# Patient Record
Sex: FEMALE | Race: WHITE | NOT HISPANIC OR LATINO | Employment: FULL TIME | URBAN - METROPOLITAN AREA
[De-identification: names, ages, dates, MRNs, and addresses within clinical notes are randomized per-mention and may not be internally consistent; named-entity substitution may affect disease eponyms.]

---

## 2017-01-04 ENCOUNTER — TELEPHONE (OUTPATIENT)
Dept: FAMILY MEDICINE | Facility: CLINIC | Age: 42
End: 2017-01-04

## 2017-01-04 NOTE — TELEPHONE ENCOUNTER
1/4/2017    Call Regarding Preventive Health Screening Cervical/PAP    Attempt 1    Message on voicemail     Comments:           Outreach   gene

## 2017-01-19 NOTE — TELEPHONE ENCOUNTER
Call Regarding Preventive Health Screening Cervical/PAP    Attempt 1    Message on voicemail     Comments:       Outreach   Velma Tejada

## 2017-01-25 NOTE — TELEPHONE ENCOUNTER
1/25/2017    Call Regarding Preventive Health Screening Cervical/PAP    Attempt 2    Message on voicemail     Comments:         Outreach   Tabitha Wilburn

## 2017-07-01 ENCOUNTER — HEALTH MAINTENANCE LETTER (OUTPATIENT)
Age: 42
End: 2017-07-01

## 2017-12-01 ENCOUNTER — OFFICE VISIT (OUTPATIENT)
Dept: OPHTHALMOLOGY | Facility: CLINIC | Age: 42
End: 2017-12-01

## 2017-12-01 DIAGNOSIS — H52.13 MYOPIA OF BOTH EYES: Primary | ICD-10-CM

## 2017-12-01 ASSESSMENT — REFRACTION_MANIFEST
OD_AXIS: 036
OS_AXIS: 134
OD_CYLINDER: +0.75
OS_SPHERE: -5.25
OD_SPHERE: -4.75
OS_CYLINDER: +0.75

## 2017-12-01 ASSESSMENT — CONF VISUAL FIELD
OD_NORMAL: 1
METHOD: COUNTING FINGERS
OS_NORMAL: 1

## 2017-12-01 ASSESSMENT — REFRACTION_WEARINGRX
OS_CYLINDER: +0.75
OS_AXIS: 127
OS_SPHERE: -5.50
OD_SPHERE: -4.75
SPECS_TYPE: SVL
OD_CYLINDER: +0.50
OD_AXIS: 030

## 2017-12-01 ASSESSMENT — VISUAL ACUITY
METHOD: SNELLEN - LINEAR
CORRECTION_TYPE: GLASSES
OD_CC+: -1
OD_CC: J1+
OS_CC: 20/20
OD_CC: 20/20
OS_CC: J1+

## 2017-12-01 ASSESSMENT — CUP TO DISC RATIO
OD_RATIO: 0.2
OS_RATIO: 0.2

## 2017-12-01 ASSESSMENT — TONOMETRY
OS_IOP_MMHG: 20
IOP_METHOD: TONOPEN
OD_IOP_MMHG: 18

## 2017-12-01 ASSESSMENT — EXTERNAL EXAM - RIGHT EYE: OD_EXAM: NORMAL

## 2017-12-01 ASSESSMENT — SLIT LAMP EXAM - LIDS
COMMENTS: NORMAL
COMMENTS: NORMAL

## 2017-12-01 ASSESSMENT — EXTERNAL EXAM - LEFT EYE: OS_EXAM: NORMAL

## 2017-12-01 NOTE — MR AVS SNAPSHOT
After Visit Summary   12/1/2017    Tressa Jeong    MRN: [id number]           Patient Information     Date Of Birth          1975        Visit Information        Provider Department      12/1/2017 2:20 PM Fortino Fleming, BRANDI M [facility]        Today's Diagnoses     Myopia of both eyes    -  1       Follow-ups after your visit        Follow-up notes from your care team     Return if symptoms worsen or fail to improve, for Contact Lens Fitting.      Who to contact     Please call your clinic at [phone] to:    Ask questions about your health    Make or cancel appointments    Discuss your medicines    Learn about your test results    Speak to your doctor   If you have compliments or concerns about an experience at your clinic, or if you wish to file a complaint, please contact [facility] Patient Relations at [phone] or email us at [email or web address]         Additional Information About Your Visit        MyChart Information     [email or web address] gives you secure access to your electronic health record. If you see a primary care provider, you can also send messages to your care team and make appointments. If you have questions, please call your primary care clinic.  If you do not have a primary care provider, please call [phone] and they will assist you.      [facility] is an electronic gateway that provides easy, online access to your medical records. With [facility], you can request a clinic appointment, read your test results, renew a prescription or communicate with your care team.     To access your existing account, please contact your [facility] or call [phone] for assistance.        Care EveryWhere ID     This is your Care EveryWhere ID. This could be used by other organizations to access your [place] medical records  [id number]         Blood Pressure from Last 3 Encounters:   04/07/16 130/82   09/24/07  102/70    Weight from Last 3 Encounters:   04/07/16 85.3 kg (188 lb)   09/24/07 72.6 kg (160 lb)              We Performed the Following     Refraction        Primary Care Provider Office Phone # Fax #    Ute Fowler [phone] [address] [facility] [address]        Equal Access to Services     [facility]: Hadii aad ku hadasho Soomaali, waaxda luqadaha, qaybta kaalmada adeegyada, waxay idiin hayaan adeeg khenochjose a la'aan . So [facility] [phone].    ATENCIÓN: Si habla espaltaf, tiene a leonardo disposición servicios gratuitos de asistencia lingüística. Foziaame al [phone].    We comply with applicable federal civil rights laws and [place] laws. We do not discriminate on the basis of race, color, national origin, age, disability, sex, sexual orientation, or gender identity.            Thank you!     Thank you for choosing [facility]  for your care. Our goal is always to provide you with excellent care. Hearing back from our patients is one way we can continue to improve our services. Please take a few minutes to complete the written survey that you may receive in the mail after your visit with us. Thank you!             Your Updated Medication List - Protect others around you: Learn how to safely use, store and throw away your medicines at www.disposemymeds.org.          This list is accurate as of: 12/1/17  3:23 PM.  Always use your most recent med list.                   Brand Name Dispense Instructions for use Diagnosis    clonazePAM 0.5 MG tablet    klonoPIN    10 tablet    Take 0.5-1 tablets (0.25-0.5 mg) by mouth 2 times daily as needed for anxiety    Adjustment disorder with anxious mood       NO ACTIVE MEDICATIONS      .

## 2017-12-01 NOTE — NURSING NOTE
Chief Complaints and History of Present Illnesses   Patient presents with     Annual Eye Exam     HPI    Affected eye(s):  Both   Symptoms:     No blurred vision   No difficulty with reading      Frequency:  Constant       Do you have eye pain now?:  No      Comments:  Patient states vision has been stable since last eye exam 8 years ago, both eyes. Denies eye pain or irritation. Uses allergy eye drops PRN OU    Maritza Taylor COT 2:24 PM December 1, 2017

## 2019-10-04 ENCOUNTER — HEALTH MAINTENANCE LETTER (OUTPATIENT)
Age: 44
End: 2019-10-04

## 2020-11-07 ENCOUNTER — HEALTH MAINTENANCE LETTER (OUTPATIENT)
Age: 45
End: 2020-11-07

## 2021-01-30 ENCOUNTER — HEALTH MAINTENANCE LETTER (OUTPATIENT)
Age: 46
End: 2021-01-30

## 2021-03-27 ENCOUNTER — IMMUNIZATION (OUTPATIENT)
Dept: NURSING | Facility: CLINIC | Age: 46
End: 2021-03-27
Payer: MEDICAID

## 2021-03-27 PROCEDURE — 91301 PR COVID VAC MODERNA 100 MCG/0.5 ML IM: CPT

## 2021-03-27 PROCEDURE — 0011A PR COVID VAC MODERNA 100 MCG/0.5 ML IM: CPT

## 2021-04-24 ENCOUNTER — IMMUNIZATION (OUTPATIENT)
Dept: NURSING | Facility: CLINIC | Age: 46
End: 2021-04-24
Attending: FAMILY MEDICINE
Payer: COMMERCIAL

## 2021-04-24 PROCEDURE — 0012A PR COVID VAC MODERNA 100 MCG/0.5 ML IM: CPT

## 2021-04-24 PROCEDURE — 91301 PR COVID VAC MODERNA 100 MCG/0.5 ML IM: CPT

## 2021-09-11 ENCOUNTER — HEALTH MAINTENANCE LETTER (OUTPATIENT)
Age: 46
End: 2021-09-11

## 2022-01-01 ENCOUNTER — HEALTH MAINTENANCE LETTER (OUTPATIENT)
Age: 47
End: 2022-01-01

## 2022-02-26 ENCOUNTER — HEALTH MAINTENANCE LETTER (OUTPATIENT)
Age: 47
End: 2022-02-26

## 2022-09-13 ENCOUNTER — OFFICE VISIT (OUTPATIENT)
Dept: FAMILY MEDICINE | Facility: CLINIC | Age: 47
End: 2022-09-13
Payer: COMMERCIAL

## 2022-09-13 VITALS
OXYGEN SATURATION: 99 % | BODY MASS INDEX: 32.86 KG/M2 | HEIGHT: 65 IN | DIASTOLIC BLOOD PRESSURE: 78 MMHG | HEART RATE: 96 BPM | WEIGHT: 197.25 LBS | TEMPERATURE: 99 F | SYSTOLIC BLOOD PRESSURE: 122 MMHG

## 2022-09-13 DIAGNOSIS — Z13.220 SCREENING FOR HYPERLIPIDEMIA: ICD-10-CM

## 2022-09-13 DIAGNOSIS — Z12.31 VISIT FOR SCREENING MAMMOGRAM: ICD-10-CM

## 2022-09-13 DIAGNOSIS — Z12.11 SCREEN FOR COLON CANCER: ICD-10-CM

## 2022-09-13 DIAGNOSIS — F43.22 ADJUSTMENT DISORDER WITH ANXIOUS MOOD: ICD-10-CM

## 2022-09-13 DIAGNOSIS — Z12.4 CERVICAL CANCER SCREENING: ICD-10-CM

## 2022-09-13 PROCEDURE — 99203 OFFICE O/P NEW LOW 30 MIN: CPT | Performed by: FAMILY MEDICINE

## 2022-09-13 RX ORDER — CLONAZEPAM 0.5 MG/1
0.25-0.5 TABLET ORAL 2 TIMES DAILY PRN
Qty: 10 TABLET | Refills: 0 | Status: SHIPPED | OUTPATIENT
Start: 2022-09-13 | End: 2023-03-21

## 2022-09-13 ASSESSMENT — ANXIETY QUESTIONNAIRES
8. IF YOU CHECKED OFF ANY PROBLEMS, HOW DIFFICULT HAVE THESE MADE IT FOR YOU TO DO YOUR WORK, TAKE CARE OF THINGS AT HOME, OR GET ALONG WITH OTHER PEOPLE?: VERY DIFFICULT
6. BECOMING EASILY ANNOYED OR IRRITABLE: NEARLY EVERY DAY
5. BEING SO RESTLESS THAT IT IS HARD TO SIT STILL: NEARLY EVERY DAY
7. FEELING AFRAID AS IF SOMETHING AWFUL MIGHT HAPPEN: NOT AT ALL
GAD7 TOTAL SCORE: 18
7. FEELING AFRAID AS IF SOMETHING AWFUL MIGHT HAPPEN: NOT AT ALL
GAD7 TOTAL SCORE: 18
IF YOU CHECKED OFF ANY PROBLEMS ON THIS QUESTIONNAIRE, HOW DIFFICULT HAVE THESE PROBLEMS MADE IT FOR YOU TO DO YOUR WORK, TAKE CARE OF THINGS AT HOME, OR GET ALONG WITH OTHER PEOPLE: VERY DIFFICULT
GAD7 TOTAL SCORE: 18
4. TROUBLE RELAXING: NEARLY EVERY DAY
2. NOT BEING ABLE TO STOP OR CONTROL WORRYING: NEARLY EVERY DAY
1. FEELING NERVOUS, ANXIOUS, OR ON EDGE: NEARLY EVERY DAY
3. WORRYING TOO MUCH ABOUT DIFFERENT THINGS: NEARLY EVERY DAY

## 2022-09-13 ASSESSMENT — PATIENT HEALTH QUESTIONNAIRE - PHQ9
SUM OF ALL RESPONSES TO PHQ QUESTIONS 1-9: 17
SUM OF ALL RESPONSES TO PHQ QUESTIONS 1-9: 17
10. IF YOU CHECKED OFF ANY PROBLEMS, HOW DIFFICULT HAVE THESE PROBLEMS MADE IT FOR YOU TO DO YOUR WORK, TAKE CARE OF THINGS AT HOME, OR GET ALONG WITH OTHER PEOPLE: EXTREMELY DIFFICULT

## 2022-09-13 NOTE — PROGRESS NOTES
"  1. Adjustment disorder with anxious mood  This is a 48 yo female with longstanding history of anxiety, mental health issues (feels she was \"misdiagnosed\" as a teenager).  Nonetheless, she is currently feeling overwhelmed by her workplace - feels that the demands are unrealistic - works with a small group and her co-workers have already quit.  She has turned in her own resignation as well - however, has a couple weeks left.  There are 2 new people in place - and she feels responsible for their success behind her, so feels that she needs to continue to \"help\".  We discussed that she needs to care for her own mental health and leave her workplace dysfunction to someone else to manage.  She has used Clonazepam in the past - last prescription was reportedly 2020? (prior to that was 2016).  It is reasonable to have something to address the panic attacks currently but patient needs something more long-term to meet her needs.  She should be referred for mental health intervention - could use both therapy/counseling, but also medication management.  Discussed with patient.  She already has some sort of diagnostic assessment scheduled within the system (in the next couple days).  She is unable to give me details about this, but if she can't get both modalities, she will need to be seen.       - clonazePAM (KLONOPIN) 0.5 MG tablet; Take 0.5-1 tablets (0.25-0.5 mg) by mouth 2 times daily as needed for anxiety  Dispense: 10 tablet; Refill: 0    2. Visit for screening mammogram  3. Screen for colon cancer  4. Cervical cancer screening  5. Screening for hyperlipidemia  Reviewed HM - as noted - patient would like to address her physical needs at a later visit.        Nurys Bustillos is a 47 year old, presenting for the following health issues:  Panic Attack      Has been seen in past for anxiety - as an emergency   Has tried to follow up with mental health care   Hard to get appointments with pandemic  Struggles with executive " "functioning  Has appointment - telehealth - for assessment - [facility]  Needs \"big picture\" fix  18 months job - toxic - quit job yesterday  (3 people work there, and all 3 quit)    Has had panic attacks in the past - had some Klonopin - hung on to them for \"security blanket\"  Has used #20 since  - last rx was     2 co-workers seem \"better\"    Feels \"defective\"  Everything is harder for her than other people    Executive functioning -   Gets home, has things to accomplish , can't convince self to do things  Doesn't feel physically depressed -   Had major depression as a teenager -   Diagnosed late teens - went to therapy long-term - \"cyclothymic\"    Non-traditional student - couldn't find jobs - last 10 years has worked jobs, but they don't last     Saw someone previously at [facility] - got meds, but \"not good for me\" -   \"may have snapped me out of depression\" but therapists weren't helpful  Hasn't seen a doctor since  - when she got the Klonopin    Teens - had lots of medical problems -   Next door neighbor is 80 - helpful - \"my best friend\"    Patient not certain what the diagnosis was -   Right now, panic is \"bad\"  Feels out of control - wants to scream, punch somebody    Not suicidal - \"it does run in my family\"  2 maternal uncles committed suicide  Mom - bipolar - does \"okay\" - did better on lithium but had to stop  2 mat uncle - disabled by mental illness    Brother  age 19 - MVA -   Dad - very nice man - hard on her, not supportive    Went to college - got a degree - as long as attended classes/read books, did well - [employer] -   Left with 2 classes left - then returned 10 years later and got degree (bachelors)        History of Present Illness       Reason for visit:  Panic attack    She eats 4 or more servings of fruits and vegetables daily.She consumes 0 sweetened beverage(s) daily.She exercises with enough effort to increase her heart rate 10 to 19 minutes per day.  She " "exercises with enough effort to increase her heart rate 5 days per week.   She is taking medications regularly.    Today's PHQ-9         PHQ-9 Total Score: 17    PHQ-9 Q9 Thoughts of better off dead/self-harm past 2 weeks :   Not at all    How difficult have these problems made it for you to do your work, take care of things at home, or get along with other people: Extremely difficult             Review of Systems   Psychiatric/Behavioral: Positive for agitation, mood changes and sleep disturbance. The patient is nervous/anxious.    All other systems reviewed and are negative.           Objective    /78 (BP Location: Left arm, Patient Position: Sitting, Cuff Size: Adult Regular)   Pulse 96   Temp 99  F (37.2  C)   Ht 1.653 m (5' 5.08\")   Wt 89.5 kg (197 lb 4 oz)   LMP 09/08/2022 (Exact Date)   SpO2 99%   BMI 32.74 kg/m    Body mass index is 32.74 kg/m .  Physical Exam  Constitutional:       Comments: Anxious appearing     HENT:      Head: Normocephalic.      Right Ear: External ear normal.   Cardiovascular:      Rate and Rhythm: Normal rate and regular rhythm.      Pulses: Normal pulses.      Heart sounds: Normal heart sounds.   Pulmonary:      Effort: Pulmonary effort is normal.      Breath sounds: Normal breath sounds.   Musculoskeletal:         General: No swelling.      Cervical back: Neck supple.   Skin:     Findings: Rash present.   Neurological:      General: No focal deficit present.      Mental Status: She is alert.   Psychiatric:         Mood and Affect: Mood normal.         Thought Content: Thought content normal.            No results found for any visits on 09/13/22.                "

## 2022-09-14 ENCOUNTER — HOSPITAL ENCOUNTER (OUTPATIENT)
Dept: BEHAVIORAL HEALTH | Facility: CLINIC | Age: 47
Discharge: HOME | End: 2022-09-14
Attending: FAMILY MEDICINE | Admitting: FAMILY MEDICINE
Payer: COMMERCIAL

## 2022-09-14 DIAGNOSIS — F41.1 GAD (GENERALIZED ANXIETY DISORDER): Primary | ICD-10-CM

## 2022-09-14 PROCEDURE — 90791 PSYCH DIAGNOSTIC EVALUATION: CPT | Mod: GT,95 | Performed by: COUNSELOR

## 2022-09-14 ASSESSMENT — COLUMBIA-SUICIDE SEVERITY RATING SCALE - C-SSRS
ATTEMPT LIFETIME: NO
2. HAVE YOU ACTUALLY HAD ANY THOUGHTS OF KILLING YOURSELF?: NO
TOTAL  NUMBER OF INTERRUPTED ATTEMPTS LIFETIME: NO
6. HAVE YOU EVER DONE ANYTHING, STARTED TO DO ANYTHING, OR PREPARED TO DO ANYTHING TO END YOUR LIFE?: NO
1. HAVE YOU WISHED YOU WERE DEAD OR WISHED YOU COULD GO TO SLEEP AND NOT WAKE UP?: NO
TOTAL  NUMBER OF ABORTED OR SELF INTERRUPTED ATTEMPTS LIFETIME: NO

## 2022-09-14 NOTE — PROGRESS NOTES
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 symptoms and decline in functioning  2. Psychosocial, Cultural and Contextual Factors: None identified.  3. Principal DSM5 Diagnoses  (Sustained by DSM5 Criteria Listed Above):   296.32 (F33.1) Major Depressive Disorder, Recurrent Episode, Moderate _ and With anxious distress  300.02 (F41.1) Generalized Anxiety Disorder.  4. Other Diagnoses that is relevant to services:      5. 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 Prognosis: Expect Improvement and Relieve Acute Symptoms.  7. Likely consequences of symptoms if not treated: If untreated, patient's mental health will likely deteriorate and may require a higher level of care.       1. A safety and risk management plan has been developed including:  Recommended that patient call 911 or go to the local ED should there be a change in any of these risk factors.Report to child / adult protection services was not applicable.     2. Patient did not identify Congregation, ethnic or cultural issues relevant to therapy at this time     3. Initial Treatment will focus on:               Depressed Mood -               Anxiety -               Functional Impairment at: home and school/work.              4. Resources/Service Plan:     services are not indicated.   Modifications to assist communication are not indicated.   Additional disability accommodations are not indicated.      5. Collaboration:   Collaboration / coordination of treatment will be initiated with the following support professionals: Ute Fowler     6.  Referrals:   The following referral(s) will be initiated: IOP, individual therapy, medication management and ADHD testing.    Next Scheduled Appointment:     Medication management intake:  Date:  Friday, 9/23/2022  Time: 2:30 pm - 3:30 pm  Provider: Jeanne Nance PhD  PA-C  Location: [facility], [address], [address], [place] [address]  Phone: [phone]  Type: Telepsychiatry     Individual therapy intake:  Date: Monday, 9/19/2022  Time: 1:00 pm - 2:00 pm  Provider: Barbie Ahuja MA  [facility]  Location: [facility], [address] [place] [address], [address], [place] [address]  Phone: [phone]  Type: Teletherapy    ADHD testing:  Date: Thursday, 11/10/2022  Time: 1:00 pm - 2:30 pm  Provider: Mychal Rice MA  LP  Location: [facility], [address] [place] [address]  Phone: [phone]  Type: Testing      A Release of Information has been obtained for the following:     NA       Primary Emergency Contact: Mk Jeong   Home Phone: [phone]   Mobile Phone: [phone]   Relation: Father    7. ZANE:     ZANE:  Discussed the general effects of drugs and alcohol on health and well-being. Recommendations include: None    8. Records:   These were reviewed at time of assessment. Information in this assessment was obtained from the medical record and provided by patient who presents as a good historian. Patient will have open access to their mental health medical record.        Clinical Substantiation:  Patient is a 47 year old female who presents with a history of anxiety, depression, and a remote diagnosis of bipolar disorder but she  "disagrees with.  Patient reports she was seen in her 20s and diagnosed with bipolar disorder, however she has never had a manic episode she does not identify with any symptoms that would meet criteria for a bipolar diagnosis. Patient is not currently followed by any outpatient mental health providers. She recently was seen in primary care and obtained a prescription for Klonopin, this has been helpful for her, especially anxiety interrupts her sleep.    Patient reports that she is in \"crisis\", due to her anxiety.  Patient endorses an increase in severity and frequency of anxiety and depressive symptoms which have been exasperated by her toxic work environment.  Patient recently put in her 2-week notice as a result, and will have her last day on September 23.  She does not want to start looking for a job until she has her anxiety more under control.  She reports right now she is able to do his work, and come home and either play on her phone or go to sleep.  When she naps, she wakes up before bedtime takes her dog for a walk and checks on her mother and then tries to go to sleep, but has a difficult time sleeping because of her nap.    Patient reports that she is struggling with concentration and focus her task completion success rate is very low she would like to be able to start a task and complete a task like a \"normal person\".  She believes that she has issues with executive functioning skills.  She does endorse symptoms of depression including increased irritability, low mood, low energy, interruptions with sleep, and symptoms of anxiety that include diarrhea, nausea, and vomiting, as result her appetite has also been low.  Patient reports that she is struggling with household tasks, she reports she is embarrassed to have anybody in her home in its current state.    Patient denies any history of suicidal ideation, suicide attempts, or self-injurious behaviors.  Patient reports a heavier history of alcohol use " about 18 months ago where she would drink daily up to 5 drinks she determined that she was drinking too much and it was not good for her mental health and has not had alcohol since February of , 2021.  No history of ZANE treatment.      The patient's acute suicide risk was determined to be low due to the following factors: Denial of suicidal thoughts, no history of suicide attempts. Patient is not currently under the influence of alcohol or illicit substances, denies experiencing command hallucinations, and has no immediate access to firearms. The patient's acute risk could be higher if noncompliant with their treatment plan, medications, follow-up appointments or using illicit substances or alcohol. Protective factors include: dedication to family or friends;safe and stable environment;regular sleep;regular physical activity;sense of belonging;help seeking behaviors when distressed;effective problem solving skills;sense of meaning.    Patient has decompensated to the point where she is struggling to function exasperated by an extremely toxic work environment her symptoms continue to increase in severity and frequency.  Due to the anxiety and stress in her workplace, patient has put in her notice without having a another job in place.  Patient would benefit from outpatient mental health supports in place such as medication management and individual therapy to assist with managing symptoms of depression, anxiety, and issues with focus and concentration.  Because of the severity of her symptoms patient would benefit from an intensive outpatient program to also help medicate risk of hospitalization.     Placement/Program/Barriers Identified: Employment, uncontrolled mental health symptoms    Referral: IOP, Medication management, individual therapy and ADHD testing.        Provider Name/ Credentials: BELTRAN Garcia, DREW,  Diagnostic Assessment completed on September 14, 2022    This note was created by undersigned  using a Dragon dictation system. All typing errors or contextual distortion are unintentional and software inherent.          LOCUS Worksheet     Name: Tressa Jeong MRN: [id number]    : 1975      Gender:  female      PMI:     Provider Name: BELTRAN Garcia LADC   Provider NPI:  [id number]    Actual level of Care Provided:  Assessment and referral    Service(s) receiving or referred to:  IOP, medication management, individual therapy, and ADHD testing    Reason for Variance: Patient's mental health is negatively impacting their ability to function well.  Patient needs a higher level of care to stabilize mental health symptoms.  Patient will benefit from more support in the community.       Rating completed by: BELTRAN Garcia LADC      I. Risk of Harm:   1      Minimal Risk of Harm    II. Functional Status:   3      Moderate Impairment    III. Co-Morbidity:   2      Minor Co-Morbidity    IV - A. Recovery Environment - Level of Stress:   3      Moderately Stress Environment    IV - B. Recovery Environment - Level of Support:   3      Limited Support in Environment    V. Treatment and Recovery History:   3      Moderate to Equivocal Response to Treatment and Recovery Management    VI. Engagement and Recovery Project:   3      Limited Engagement and Recovery       18 Composite Score    Level of Care Recommendation:   17 to 19       High Intensity Community Based Services

## 2022-09-14 NOTE — PATIENT INSTRUCTIONS
Medication management intake:  Date:  Friday, 9/23/2022  Time: 2:30 pm - 3:30 pm  Provider: Jeanne Nance PhD  PA-C  Location: [facility], [address] [place] [address], [address] [address]  Phone: [phone]  Type: Telepsychiatry     Individual therapy intake:  Date: Monday, 9/19/2022  Time: 1:00 pm - 2:00 pm  Provider: Barbie Ahuja MA  [facility]  Location: [facility], [address], [address] [address]  Phone: [phone]  Type: Teletherapy    ADHD testing:  Date: Thursday, 11/10/2022  Time: 1:00 pm - 2:30 pm  Provider: Mychal Rice MA    Location: [facility], [address] [place] [address]  Phone: [phone]  Type: Testing    Welcome to [facility] Adult Mental Health Outpatient Programs    Thank you for choosing [facility]!    Congratulations! You have completed the first step in your recovery by participating in a Diagnostic Assessment. With your input, BELTRAN Garcia, [facility], is recommending the following level of care and services to best meet your needs.    Managing mental health symptoms while balancing life stressors can be difficult. Our mental health programming will provide the group therapy, education, skills, and support needed to improve your well-being while living a healthy and manageable lifestyle.    All our Adult Mental Health Outpatient Programs are group-based and allow you to meet with peers who are trying to manage similar symptoms and/or life circumstances in a safe and therapeutic setting.    Recommendations and Plan:    Level of Care:  Admission Intensive Outpatient Program     Start Date:  TBD    Schedule:  Admission IOP AM: Monday through Thursday @ 9 AM to 11:50 AM    If you were placed on a Wait List following your Diagnostic Assessment, please expect the following:  You will receive a phone call from the program within a few days to discuss a start date and plan.     Please contact the program at the number listed above if you are choosing to be removed from the Wait List, need to reschedule your start or if you have any additional questions.    Type of Participation:  Virtual     We are currently providing 100% online group-based programming. It is a requirement that you be physically in the State of [place] when accessing services. Our providers must be licensed in the state you are located in.      To provide the best group experience for everyone, we expect confidentiality, regular attendance, and respectful participation by all.      Cameras need to be on during groups. We want to see you!   Be sure to be in a private place where others will not overhear or walk in. Using headphones and making sure your screen is not visible to others are steps you can take to ensure confidentiality.   What is said in group, stays in group. (All personal or identifying information shared in group should be kept confidential and not shared with anyone).  NOTE: Audio or Visual recordings are not allowed and may result in immediate discharge from the program.  Zoom may automatically show your first and last name unless you change it when logging into group. We encourage you to change your name to your first or preferred name. You may also include preferred pronouns.   Please be sitting upright in a comfortable position. This will maximize engagement and participation for everyone.   Please refrain from smoking, eating, driving, or engaging in other distracting activities during groups.  Facilitators may provide reminders of the above expectations during group as needed.    If your camera is off and you are not responding to prompts, facilitators will assume you have left and place you in the waiting room. You will need to request to return to group.    Please do NOT cancel your appointments through [employer].  If you are going to be absent, please contact the program number and leave a message so  staff will not expect you.   You will NOT be billed for any sessions you do not attend.    See additional attendance and program participation information below.     Accessing Virtual Groups:    The best way to attend groups is through your [employer] account. Please ask staff if you need assistance setting up an account. [employer] HELPLINE: [phone] or [employer] - Login Page [email or web address]).  You will also need to download Zoom Download for Windows - Zoom to your computer (preferred), phone or iPad/Tablet devise. It is NOT necessary to log into or set up a Zoom account.  Log into My Chart each day before group.   Go to the  Visits  tab and select the current date.    You will be asked to verify personal information on the first day or for longer programs, every 30 days. Please allow extra time on your first day to complete this. You will also be asked to complete assessments regularly so we can monitor your progress and address concerns.    The daily invite through [employer] expires 15 minutes after group starts.   You will need to call the program number to request a link to the group if you:   log out of group once it begins   are late to group   get disconnected   are unable to access group for any reason    There is a new link created every day.    Breaks are provided between groups (10 minutes)   Do not log out.  You may mute or pause your video.   The group facilitator may put you in virtual waiting room until the next group starts.        Admission Intensive Outpatient Treatment Program Overview  (Admission-IOP) [phone]      Patients will be scheduled to start the Admission IOP track which meets four days/week. The treatment team will provide education, skills training, and support to build a foundation for a successful treatment experience. The focus is to help orient new patients to programming and prepare them for the best possible outcomes. This track is intended to be short-term, and most patients will  participate for approximately one week. Providers will continue to assess your needs, help you set up an individualized treatment plan and, with your input, determine which treatment team and peer group is best for you.       Topics covered include: a review of program expectations and structure, assessing group readiness, introduction to understanding healthy vs. unhealthy coping strategies, experiential mindfulness, and working to identify and create social supports and services. Additional topics will align with those listed in the grid below. All groups are facilitated by a Licensed mental health professional and include the expertise of a Registered Nurse and Occupational Therapist.       Patients will also see the program Psychiatrist and/or a psychologist on the first or second day of treatment.      Patients will transition to an IOP  home track,  where they will continue their treatment. The  home track  will meet three days per week. See more information in the grid.      Most patients attend the combined IOP services for up to 12-weeks. Time frames may vary throughout this process as patients will transfer when an appropriate placement opportunity is available. Your treatment team will work closely with you for a smooth transition.       [facility] provides several  home track  IOP options. Sometimes patients and/or the treatment team may determine that another service is recommended. Referrals will be offered when appropriate.      Intensive Outpatient Program Overview:   This level of care is less intensive than an inpatient or partial hospitalization program and provides more support than traditional individual psychotherapy.        Length of Stay Typically, patients attend up to 12 weeks of programming, although this can     vary depending on specific patient needs.      Treatment team During the Admission IOP patients will meet with a multi-disciplinary team  including: a psychiatrist,  psychotherapist, registered nurse, and occupational     therapist.      When transferred to an [facility]  home group , patients will continue groups with a team of licensed mental health professionals including psychotherapists and a psychiatrist and/or psychologist.          Group-based programming 3 groups per day; 3-4 days per week   50 minutes per group   10-minute break between each group   Facilitated by a member of the treatment team      Group Psychotherapy is provided daily, allowing time to check-in, process concerns and share feedback/support. All other groups include a topic and provide opportunities for education, skill building, discussion, and support.       Example   Group   Topics Admission IOP topics are listed above and will align with additional group topics covered throughout the 12-week combined programming.       Topics may include:        Behavior Activation, Cognitive Restructuring: Cognitive Distortions, Communication Skills/ Areas for Self-Improvement. Coping Skills, Discharge Planning, Dimensions of Wellness, Emotions, Forgiveness, Functional Nutrition, Grief, Life Transitions, Leisure Exploration, Life Skills, Listening for Meaning, Medication Assessment, Mental Health Social Support, Mindfulness, Mood Tracking/Triggers, Motivation and Procrastination, Relationship, Relaxation Techniques, Self-Awareness, Self-Confidence, Self-Care, Self-Compassion, Self-Esteem and Self-compassion, Shame and Guilt, Sleep hygiene, SMART Goals, Stages to [place] with Stress, Stigma, Strategies to Improve Motivation, Symptom Awareness, Time Management, Trauma Triggers, Values, Wellness       Psychiatrist and/or Psychologist Patients will continue to see the program psychiatrist and/or psychologist for follow-up every 30 days or more frequent, as needed.       If seeing the psychiatrist, they will partner with you and your other providers for medication management, as needed.    Psychiatry services will be billed  separately.    For insurance purposes, please coordinate with team to prevent scheduling to see the program psychiatrist on the same day you see your outpatient psychiatrist.    If seeing the psychologist, they will provide individual therapy. Medication management will not be provided.       NOTE: Either provider will continue to assess your progress and coordinate with the treatment team to determine when you may be ready for completion.  This is a program requirement.        Individual Therapy See psychologist services above.   Physical Health Screen You will meet with a program nurse within the first week to complete a brief physical health screen. This is a program requirement.   FMLA or Short-term Disability We encourage you to request completion of paperwork from your long-term providers.   If this is not an option, please notify the program nurse as soon as possible.  We will do our best to help you coordinate completion of paperwork.   Medical Record requests: please contact Release of Information  at [phone] and allow up to 14 days for records once the authorization for release is received.    Treatment and Discharge Planning Patients meet individually with team members for treatment planning.     We will help you develop goals and identify strengths and/or barriers.   We will discuss your program participation, progress, and discharge planning.   We are available to assist with referrals and service coordination; please let us know how best we can support your specific needs.   You will receive copies of your treatment plan and discharge summary via [employer].          An Important Note from your Program Treatment Team...    Welcome! We are happy to be partnering with you on your recovery journey. Our experienced clinicians have developed programming based on current research and evidence-based practice to provide you with high quality mental health treatment.     Attendance and Program  Participation:  You will participate in a variety of groups each day. Our goal is to provide you with a rich and varied therapeutic healing environment knowing patients have unique experiences and preferred ways of sharing, learning, processing, and engaging in the recovery process.  You are expected to attend all groups on time.  If you are going to be late or absent, please let a team member know the reason. You can also leave that same information at the number listed above.  In the event of an unreported absence, we will reach out to you. If we are unable to reach you, know that we may call your emergency contact.  We always attempt to establish contact with your emergency contact prior to initiating a wellness check.  While it is important that you continue to attend appointments with your individual therapist, psychiatrist, and other medical providers, you are encouraged to schedule these appointments outside of group hours whenever possible.  If your attendance becomes a concern, your treatment team will have a discussion with you and may start an Attendance Agreement. Following your Attendance Agreement is required to prevent early discharge from the program.  To get the most out of the program and to support your wellbeing we require that you do not use any chemicals, tobacco or vape products on screen or during program hours. The team is available to assist you if this is something you struggle with.  Please be mindful that you are part of a group; therefore, we ask that you be respectful of other group members' needs and do not use derogatory, offensive, or discriminatory language.  You will be removed from group if suspected of being under the influence of substances or if using language that negatively impacts the group.  Your treatment team will address any concerns with you and offer recommendations. A Behavior Agreement may be started. Following your Behavior Agreement is required to prevent early  discharge from the program.  Communication with other group members outside of group is discouraged. This can affect your treatment and the way the group functions.  If you choose to share contact information with group peers AFTER you are discharged from the program, this decision is completely independent of any program coordination or support.  While in the program, participants may not engage in any sexual or intimate relationships with each other outside of group. This may result in immediate discharge of both participants from the program.   Trauma:  Many of our patients have experienced trauma. You are not alone. This can be challenging for patients to manage. All our team members have been trained in Trauma Informed Care and will provide you with the education, skills training, and support that you need to stabilize your symptoms.  Specific details and descriptions of abuse, assault, violence, neglect, etc., are best processed in individual therapy as to avoid triggering other group members.  Discussing how traumatic experiences have impacted beliefs about self/others/world and practicing skills to cope with symptoms is very appropriate for group therapy.     We look forward to working together to support your mental health.     We love feedback from our patients about our program!  Please take a few moments to respond to surveys sent out by [employer].  This will help us continue to make improvements and to keep the things that are   important to you!

## 2022-09-18 ASSESSMENT — ENCOUNTER SYMPTOMS
AGITATION: 1
SLEEP DISTURBANCE: 1
NERVOUS/ANXIOUS: 1

## 2022-09-19 ENCOUNTER — TELEPHONE (OUTPATIENT)
Dept: BEHAVIORAL HEALTH | Facility: CLINIC | Age: 47
End: 2022-09-19

## 2022-09-19 NOTE — TELEPHONE ENCOUNTER
Writer and patient discussed programming; patient reported she would like to wait until after her testing for ADHD in November before agreeing to group treatment. Patient expressed she will continue with individual therapy and is not interested in groups at this time.     BELTRAN Galindo on 9/19/2022 at 9:34 AM

## 2022-10-29 ENCOUNTER — HEALTH MAINTENANCE LETTER (OUTPATIENT)
Age: 47
End: 2022-10-29

## 2022-11-15 ENCOUNTER — VIRTUAL VISIT (OUTPATIENT)
Dept: PSYCHOLOGY | Facility: CLINIC | Age: 47
End: 2022-11-15
Payer: COMMERCIAL

## 2022-11-15 DIAGNOSIS — F41.1 GAD (GENERALIZED ANXIETY DISORDER): ICD-10-CM

## 2022-11-15 DIAGNOSIS — F88 DEVELOPMENTAL MENTAL DISORDER: Primary | ICD-10-CM

## 2022-11-15 PROCEDURE — 90834 PSYTX W PT 45 MINUTES: CPT | Mod: 95

## 2022-11-15 ASSESSMENT — COLUMBIA-SUICIDE SEVERITY RATING SCALE - C-SSRS
6. HAVE YOU EVER DONE ANYTHING, STARTED TO DO ANYTHING, OR PREPARED TO DO ANYTHING TO END YOUR LIFE?: NO
1. HAVE YOU WISHED YOU WERE DEAD OR WISHED YOU COULD GO TO SLEEP AND NOT WAKE UP?: NO
2. HAVE YOU ACTUALLY HAD ANY THOUGHTS OF KILLING YOURSELF?: NO
ATTEMPT LIFETIME: NO
TOTAL  NUMBER OF ABORTED OR SELF INTERRUPTED ATTEMPTS LIFETIME: NO
TOTAL  NUMBER OF INTERRUPTED ATTEMPTS LIFETIME: NO

## 2022-11-15 ASSESSMENT — PATIENT HEALTH QUESTIONNAIRE - PHQ9
5. POOR APPETITE OR OVEREATING: NEARLY EVERY DAY
SUM OF ALL RESPONSES TO PHQ QUESTIONS 1-9: 15

## 2022-11-15 ASSESSMENT — ANXIETY QUESTIONNAIRES
6. BECOMING EASILY ANNOYED OR IRRITABLE: NEARLY EVERY DAY
3. WORRYING TOO MUCH ABOUT DIFFERENT THINGS: SEVERAL DAYS
2. NOT BEING ABLE TO STOP OR CONTROL WORRYING: MORE THAN HALF THE DAYS
7. FEELING AFRAID AS IF SOMETHING AWFUL MIGHT HAPPEN: NOT AT ALL
1. FEELING NERVOUS, ANXIOUS, OR ON EDGE: MORE THAN HALF THE DAYS
5. BEING SO RESTLESS THAT IT IS HARD TO SIT STILL: SEVERAL DAYS
GAD7 TOTAL SCORE: 12
GAD7 TOTAL SCORE: 12
IF YOU CHECKED OFF ANY PROBLEMS ON THIS QUESTIONNAIRE, HOW DIFFICULT HAVE THESE PROBLEMS MADE IT FOR YOU TO DO YOUR WORK, TAKE CARE OF THINGS AT HOME, OR GET ALONG WITH OTHER PEOPLE: EXTREMELY DIFFICULT

## 2022-11-16 ENCOUNTER — TELEPHONE (OUTPATIENT)
Dept: PSYCHOLOGY | Facility: CLINIC | Age: 47
End: 2022-11-16

## 2022-11-16 NOTE — TELEPHONE ENCOUNTER
This patient of yours reached out. She states she had an appt with you yesterday and she was suppose to receive some paperwork to fill out before the next appt. She has not received them yet and was making sure they didn't get sent to the wrong email. Her email is [email or web address]. Thank you and have a great day!

## 2022-11-22 ENCOUNTER — VIRTUAL VISIT (OUTPATIENT)
Dept: PSYCHOLOGY | Facility: CLINIC | Age: 47
End: 2022-11-22
Payer: COMMERCIAL

## 2022-11-22 DIAGNOSIS — F41.1 GAD (GENERALIZED ANXIETY DISORDER): ICD-10-CM

## 2022-11-22 DIAGNOSIS — F88 DEVELOPMENTAL MENTAL DISORDER: Primary | ICD-10-CM

## 2022-11-22 PROCEDURE — 90791 PSYCH DIAGNOSTIC EVALUATION: CPT | Mod: GT

## 2022-11-29 NOTE — PROGRESS NOTES
[facility]   Mental Health & Addiction Services     Progress Note - Initial Visit    Client Name:  Tressa Jeong Date: 2022         Service Type: Individual     Visit Start Time: 11:00am  Visit End Time: 11:52am    Visit #: 1    Attendees: Client attended alone    Service Modality:  Video Visit:      Provider verified identity through the following two step process.  Patient provided:  Patient     Telemedicine Visit: The patient's condition can be safely assessed and treated via synchronous audio and visual telemedicine encounter.      Reason for Telemedicine Visit: Services only offered telehealth    Originating Site (Patient Location): Patient's home    Distant Site (Provider Location): [facility] MENT[facility] HEALTH & [facility]    Consent:  The patient/guardian has verbally consented to: the potential risks and benefits of telemedicine (video visit) versus in person care; bill my insurance or make self-payment for services provided; and responsibility for payment of non-covered services.     Patient would like the video invitation sent by:  Send to e-mail at: [email or web address]    Mode of Communication:  Video Conference via [facility]    Distant Location (Provider):  On-site    As the provider I attest to compliance with applicable laws and regulations related to telemedicine.       DATA:  Extended Session (53+ minutes): No  Interactive Complexity: No   Crisis: No     Presenting Concerns/Current Stressors:   Patient presented to session to initiate the ADHD evaluation process.       PHQ 2016 2022 11/15/2022   PHQ-9 Total Score 12 17 15   Q9: Thoughts of better off dead/self-harm past 2 weeks Not at all Not at all Not at all        LYUBOV-7 SCORE 2016 2022 11/15/2022   Total Score - 18 (severe anxiety) -   Total Score 9 - 12   Some encounter information is confidential and restricted. Go to Review Flowsheets activity to see all  data.       ASSESSMENT:  Mental Status Assessment:  Appearance:   Appropriate   Eye Contact:   Good   Psychomotor Behavior: Normal   Attitude:   Cooperative   Orientation:   All  Speech   Rate / Production: Normal/ Responsive   Volume:  Normal   Mood:    Normal  Affect:    Appropriate   Thought Content:  Clear   Thought Form:  Coherent   Insight:    Good       Safety Issues and Plan for Safety and Risk Management:     [place] Suicide Severity Rating Scale (Lifetime/Recent)  [place] Suicide Severity Rating (Lifetime/Recent) 11/15/2022   1. Wish to be Dead (Lifetime) 0   2. Non-Specific Active Suicidal Thoughts (Lifetime) 0   Actual Attempt (Lifetime) 0   Has subject engaged in non-suicidal self-injurious behavior? (Lifetime) 0   Interrupted Attempts (Lifetime) 0   Aborted or Self-Interrupted Attempt (Lifetime) 0   Preparatory Acts or Behavior (Lifetime) 0   Calculated C-SSRS Risk Score (Lifetime/Recent) No Risk Indicated   Some encounter information is confidential and restricted. Go to Review Flowsheets activity to see all data.     Patient denies current fears or concerns for personal safety.  Patient denies current or recent suicidal ideation or behaviors.  Patient denies current or recent homicidal ideation or behaviors.  Patient denies current or recent self injurious behavior or ideation.  Patient denies other safety concerns.  Recommended that patient call 911 or go to the local ED should there be a change in any of these risk factors.   Patient reports there are no firearms in the house.    Diagnostic Criteria:  F88  A. A persistent pattern of inattention and/or hyperactivity-impulsivity that interferes with functioning or development, as characterized by (1) and/or (2):   1. Six or more inattention symptoms that have persisted for at least 6 months to a degree that is inconsistent with developmental level and that negatively impacts directly on social and academic/occupational activities.   2. Six or more  hyperactivity and impulsivity symptoms that have persisted for at least 6 months to a degree that is inconsistent with developmental level and that negatively impacts directly on social and academic/occupational activities.  B. Several symptoms (inattentive or hyperactive/impulsive) were present before the age of 12 years.  C. Several symptoms (inattentive or hyperactive/impulsive) present in ?2 settings (eg, at home, school, or work; with friends or relatives; in other activities).  D. There is clear evidence that the symptoms interfere with or reduce the quality of social, academic, or occupational functioning.  E. Symptoms do not occur exclusively during the course of schizophrenia or another psychotic disorder, and are not better explained by another mental disorder (eg, mood disorder, anxiety disorder, dissociative disorder, personality disorder, substance intoxication, or withdrawal).    F41.1  A. Excessive anxiety and worry, occurring more days than not for at least 6 months about a number of events or activities.   B. The individual finds it difficult to control the worry.  C. The anxiety and worry are associated with 3 or more of 6 symptoms.  D. The anxiety, worry, or physical symptoms cause clinically significant distress or impairment in social, occupational, or other important areas of functioning.  E. The disturbance is not attributable to the physiological effects of a substance (e.g., a drug of abuse, a medication) or another medical condition (e.g., hyperthyroidism).  F. The disturbance is not better explained by another mental disorder (e.g., anxiety or worry about having panic attacks in panic disorder, negative evaluation in social anxiety disorder [social phobia], contamination or other obsessions in obsessive-compulsive disorder, separation from attachment figures in separation anxiety disorder, reminders of traumatic events in posttraumatic stress disorder, gaining weight in anorexia nervosa,  physical complaints in somatic symptom disorder, perceived appearance flaws in body dysmorphic disorder, having a serious illness in illness anxiety disorder, or the content of delusional beliefs in schizophrenia or delusional disorder).        DSM5 Diagnoses: (Sustained by DSM5 Criteria Listed Above)  Diagnoses:   1. Developmental mental disorder    2. LYUBOV (generalized anxiety disorder)      Psychosocial & Contextual Factors: Client reported that she has a stable living environment, but that she is currently on unemployment so she has some financial concerns. Client reported that her best friend and mother are strong relationships to her.  WHODAS 2.0 (12 item):   WHODAS 2.0 Total Score 9/13/2022 11/14/2022   Total Score - 31   Total Score MyChart 30 31   Some encounter information is confidential and restricted. Go to Review Flowsheets activity to see all data.       PROMIS-10 Scores  Global Mental Health Score: 10  Global Physical Health Score: 15   PROMIS TOTAL - SUBSCORES: 25      Intervention:              Reviewed symptoms and history of presenting concern. Patient endorsed symptoms consistent with anxiety  and ADHD. Patient denied symptoms associated with depression , anette, panic, OCD, trauma, perceptual difficulties and disordered eating. Unable to complete diagnostic intake, will be completed in next session.  CBT: socratic questioning, positive reinforcement  EFT: empathetic attunement, emotion checking, emotion naming  MI: open ended questions, affirmations, reflections        Attendance Agreement:  Client has not signed the attendance agreement. Discussed expectations at beginning of this first session and patient agreed.       PLAN:  Provider will continue Diagnostic Assessment in next session. Patient will complete Teresa questionnaires  and CNS Vital Signs prior to next session.    Patient meets the following risk assessment and triage: Patient denied any current/recent/lifetime history of suicidal  ideation and/or behaviors.  No safety plan indicated at this time.     Medical necessity criteria is warranted in order to: Measure a psychological disorder and its severity and functional impairment to determine psychiatric diagnosis when a mental illness is suspected, or to achieve a differential diagnosis from a range of medical/psychological disorders that present with similar constellations of symptoms (e.g., determination and measurement of anxiety severity and impact in the presence of ongoing asthma or heart disease), Perform symptom measurement to objectively measure treatment effectiveness and/or determine the need to refer for pharmacological treatment or other medical evaluation (e.g., based on severity and chronicity of symptoms) and Evaluate primary symptoms of impaired attention and concentration that can occur in many neurological and psychiatric conditions.    Medical necessity for psychological assessment is warranted as a result of the following: (1) A specific clinical question is posed that relates to the condition/symptoms being addressed (2) The question cannot be adequately addressed by clinical interview and/or behavioral observation (3) Results of psychological testing are reasonably expected to provide an answer to the query (4) It is reasonably expected that the testing will provide information leading to a clearer diagnosis and/or guide treatment planning with an expectation of improved clinical outcome.    I acknowledge that, based upon current clinical information, the patient and I have reviewed and discussed issues pertaining to the purpose of therapy/testing, potential therapeutic goals, procedures, risks and benefits, and estimated duration of therapy/testing. Issues pertaining to fees/insurance and confidentiality were also addressed with the patient, who indicated understanding and elected to continue with appointments. I will not be providing any experimental procedures and, if  we agree that a change in clinical procedure would be more beneficial, I will obtain specific consent for that procedure or refer you to another provider who has expertise in that area.       Latisha Freitas MA  Doctoral Candidate Psychology Practicum Student
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
## 2022-12-22 ENCOUNTER — TELEPHONE (OUTPATIENT)
Dept: BEHAVIORAL HEALTH | Facility: CLINIC | Age: 47
End: 2022-12-22

## 2022-12-22 NOTE — TELEPHONE ENCOUNTER
Returned patient's call. Upset about lack of communication. Apologized for lack of follow-up and indicated I will ensure that her provider is aware of and prepped for the upcoming feedback session.     Aleja Colon PsyD,   Clinical Psychologist

## 2022-12-22 NOTE — TELEPHONE ENCOUNTER
Patient calling about ADHD eval- very upset at the timelines it has taken to get items completed and provider feedback.    Writer scheduled feedback session with Latisha on 12/27.    Patient reports struggling and wanting to speak with Aleja.

## 2022-12-27 ENCOUNTER — VIRTUAL VISIT (OUTPATIENT)
Dept: PSYCHOLOGY | Facility: CLINIC | Age: 47
End: 2022-12-27
Payer: COMMERCIAL

## 2022-12-27 DIAGNOSIS — F41.1 GAD (GENERALIZED ANXIETY DISORDER): Primary | ICD-10-CM

## 2022-12-27 PROCEDURE — 90832 PSYTX W PT 30 MINUTES: CPT | Mod: 95

## 2022-12-27 ASSESSMENT — ANXIETY QUESTIONNAIRES
IF YOU CHECKED OFF ANY PROBLEMS ON THIS QUESTIONNAIRE, HOW DIFFICULT HAVE THESE PROBLEMS MADE IT FOR YOU TO DO YOUR WORK, TAKE CARE OF THINGS AT HOME, OR GET ALONG WITH OTHER PEOPLE: VERY DIFFICULT
GAD7 TOTAL SCORE: 14
4. TROUBLE RELAXING: NEARLY EVERY DAY
5. BEING SO RESTLESS THAT IT IS HARD TO SIT STILL: MORE THAN HALF THE DAYS
1. FEELING NERVOUS, ANXIOUS, OR ON EDGE: NEARLY EVERY DAY
GAD7 TOTAL SCORE: 14
7. FEELING AFRAID AS IF SOMETHING AWFUL MIGHT HAPPEN: NOT AT ALL
7. FEELING AFRAID AS IF SOMETHING AWFUL MIGHT HAPPEN: NOT AT ALL
6. BECOMING EASILY ANNOYED OR IRRITABLE: MORE THAN HALF THE DAYS
3. WORRYING TOO MUCH ABOUT DIFFERENT THINGS: MORE THAN HALF THE DAYS
GAD7 TOTAL SCORE: 14
2. NOT BEING ABLE TO STOP OR CONTROL WORRYING: MORE THAN HALF THE DAYS
8. IF YOU CHECKED OFF ANY PROBLEMS, HOW DIFFICULT HAVE THESE MADE IT FOR YOU TO DO YOUR WORK, TAKE CARE OF THINGS AT HOME, OR GET ALONG WITH OTHER PEOPLE?: VERY DIFFICULT

## 2022-12-27 NOTE — PROGRESS NOTES
[facility]   Mental Health & Addiction Services    Progress Note    Patient Name: Tressa Jeong  Date: 2022       Service Type:  ADHD Feedback appointment      Session Start Time: 2:00pm  Session End Time:  2:31pm     Session Length: 31 minutes    Session #: 3    Attendees: Client attended alone    Service Modality:  Video Visit:      Provider verified identity through the following two step process.  Patient provided:  Patient     Telemedicine Visit: The patient's condition can be safely assessed and treated via synchronous audio and visual telemedicine encounter.      Reason for Telemedicine Visit: Services only offered telehealth    Originating Site (Patient Location): Patient's home    Distant Site (Provider Location): [facility] MENTAL HEALTH & ADDICTION [facility]    Consent:  The patient/guardian has verbally consented to: the potential risks and benefits of telemedicine (video visit) versus in person care; bill my insurance or make self-payment for services provided; and responsibility for payment of non-covered services.     Patient would like the video invitation sent by:  Send to e-mail at: [email or web address]    Mode of Communication:  Video Conference via Amwell    Distant Location (Provider):  On-site    As the provider I attest to compliance with applicable laws and regulations related to telemedicine.      DATA  Interactive Complexity: No  Crisis: No       Progress Since Last Session (Related to Symptoms / Goals / Homework):   Symptoms: No change symptoms have remained stable    Homework: Patient completed and submitted Teresa questionnaires and CNS vital signs as requested.     Episode of Care Goals: Satisfactory progress - ACTION (Actively working towards change); Intervened by reinforcing change plan / affirming steps taken     Current / Ongoing Stressors and Concerns:   Discussed manifestations of  attention and concentration problems in various areas of her life.                  Treatment Objective(s) Addressed in This Session:          Provided feedback on ADHD evaluation. Reviewed test results in depth. The client did not feel the results accurately reflected her functioning, and wished to undergo further testing. Provider and client scheduled a day for the client to come in to be administered the WAIS-IV.                 Intervention:              Provided feedback to patient regarding testing results, diagnoses, and treatment recommendations. Test results are not consistent with an ADHD diagnosis and symptoms seem to be better explained by an anxiety disorder. Personalized suggestions regarding symptoms were offered, however the client was adamant that her experience was not due to anxiety. At the client's request, provider and client scheduled a date for her to be administered the WAIS-IV.    EFT: empathetic attunement, emotion checking, emotion modeling, emotion naming  MI: open ended questions, affirmations, reflections    Assessments completed prior to visit:  The following assessments were completed by patient for this visit:  PHQ9:   PHQ-9 SCORE 4/7/2016 9/13/2022 11/15/2022   PHQ-9 Total Score MyChart - 17 (Moderately severe depression) -   PHQ-9 Total Score 12 17 15     GAD7:   LYUBOV-7 SCORE 4/7/2016 9/13/2022 11/15/2022 12/27/2022   Total Score - 18 (severe anxiety) - 14 (moderate anxiety)   Total Score 9 - 12 14   Some encounter information is confidential and restricted. Go to Review Flowsheets activity to see all data.     CAGE-AID:   CAGE-AID Total Score 9/13/2022 11/15/2022   Total Score - 3   Total Score MyChart 1 (A total score of 2 or greater is considered clinically significant) -   Some encounter information is confidential and restricted. Go to Review Flowsheets activity to see all data.     PROMIS 10-Global Health (all questions and answers displayed):   PROMIS 10 9/13/2022 11/15/2022    In general, would you say your health is: Good -   In general, would you say your quality of life is: Good -   In general, how would you rate your physical health? Good -   In general, how would you rate your mental health, including your mood and your ability to think? Fair -   In general, how would you rate your satisfaction with your social activities and relationships? Fair -   In general, please rate how well you carry out your usual social activities and roles Fair -   To what extent are you able to carry out your everyday physical activities such as walking, climbing stairs, carrying groceries, or moving a chair? Completely -   How often have you been bothered by emotional problems such as feeling anxious, depressed or irritable? Often -   How would you rate your fatigue on average? Severe -   How would you rate your pain on average?   0 = No Pain  to  10 = Worst Imaginable Pain 0 -   In general, would you say your health is: - 3   In general, would you say your quality of life is: - 3   In general, how would you rate your physical health? - 2   In general, how would you rate your mental health, including your mood and your ability to think? - 2   In general, how would you rate your satisfaction with your social activities and relationships? - 3   In general, please rate how well you carry out your usual social activities and roles. (This includes activities at home, at work and in your community, and responsibilities as a parent, child, spouse, employee, friend, etc.) - 2   To what extent are you able to carry out your everyday physical activities such as walking, climbing stairs, carrying groceries, or moving a chair? - 5   In the past 7 days, how often have you been bothered by emotional problems such as feeling anxious, depressed, or irritable? - 4   In the past 7 days, how would you rate your fatigue on average? - 3   In the past 7 days, how would you rate your pain on average, where 0 means no pain,  and 10 means worst imaginable pain? - 0   Global Mental Health Score - 10   Global Physical Health Score - 15   PROMIS TOTAL - SUBSCORES - 25   Some encounter information is confidential and restricted. Go to Review Flowsheets activity to see all data.   Some recent data might be hidden     [place] Suicide Severity Rating Scale (Lifetime/Recent)  [place] Suicide Severity Rating (Lifetime/Recent) 11/15/2022   1. Wish to be Dead (Lifetime) 0   2. Non-Specific Active Suicidal Thoughts (Lifetime) 0   Actual Attempt (Lifetime) 0   Has subject engaged in non-suicidal self-injurious behavior? (Lifetime) 0   Interrupted Attempts (Lifetime) 0   Aborted or Self-Interrupted Attempt (Lifetime) 0   Preparatory Acts or Behavior (Lifetime) 0   Calculated C-SSRS Risk Score (Lifetime/Recent) No Risk Indicated   Some encounter information is confidential and restricted. Go to Review Flowsheets activity to see all data.        ASSESSMENT: Current Emotional / Mental Status (status of significant symptoms):   Risk status (Self / Other harm or suicidal ideation)   Patient denies current fears or concerns for personal safety.   Patient denies current or recent suicidal ideation or behaviors.   Patient denies current or recent homicidal ideation or behaviors.   Patient denies current or recent self injurious behavior or ideation.   Patient denies other safety concerns.   Patient reports there has been no change in risk factors since their last session.     Patient reports there has been no change in protective factors since their last session.     Recommended that patient call 911 or go to the local ED should there be a change in any of these risk factors.     Appearance:   Appropriate    Eye Contact:   Good    Psychomotor Behavior: Normal    Attitude:   Cooperative    Orientation:   All   Speech    Rate / Production: Normal     Volume:  Normal    Mood:    Normal   Affect:    Appropriate    Thought Content:  Clear    Thought  Form:  Coherent  Logical    Insight:    Good      Medication Review:   No changes to current psychiatric medication(s)     Medication Compliance:   Yes     Changes in Health Issues:   None reported     Chemical Use Review:   Substance Use: Chemical use reviewed, no active concerns identified      Nicotine Use: No current tobacco use.      Diagnosis:  1. LYUBOV (generalized anxiety disorder)        PLAN:   Patient will come in person to the clinic to be administered further cognitive testing. Full report will be withheld until that testing is completed.       Latisha Freitas MA  Doctoral Candidate psychology practicum student  Answers for HPI/ROS submitted by the patient on 12/27/2022  LYUBOV 7 TOTAL SCORE: 14

## 2022-12-27 NOTE — Clinical Note
Please review for signature. Heads up I put the billing as no charge since it was originally a feedback session, but I'm not sure if that should be changed since now we will have another feedback session after the WAIS-IV

## 2023-01-12 ENCOUNTER — PSYCHE (OUTPATIENT)
Dept: PSYCHOLOGY | Facility: CLINIC | Age: 48
End: 2023-01-12
Payer: COMMERCIAL

## 2023-01-12 DIAGNOSIS — F41.1 GAD (GENERALIZED ANXIETY DISORDER): ICD-10-CM

## 2023-01-12 DIAGNOSIS — F88 DEVELOPMENTAL MENTAL DISORDER: Primary | ICD-10-CM

## 2023-01-12 NOTE — PROGRESS NOTES
[facility]   Mental Health & Addiction Services     Progress Note    Patient Name: Tressa Jeong  Date: January 12, 2023       Service Type:  Individual      Session Start Time: 9:00am  Session End Time:  10:49     Session Length: 109    Session #: 4    Attendees: Client attended alone    Service Modality:  In-person      DATA  Interactive Complexity: No  Crisis: No       Progress Since Last Session (Related to Symptoms / Goals / Homework):   Symptoms: No change Client's symptoms have remained stable from last session    Homework: N/A.      Episode of Care Goals: N/A - no ongoing care provided.      Current / Ongoing Stressors and Concerns:   The client's ongoing stressors are her struggles in unemployment and her ongoing attention difficulties.     Treatment Objective(s) Addressed in This Session:   Completing cognitive testing to measure reported problems with attention difficulties.     Intervention:   Began session by orienting patient to testing process. Patient had the opportunity to ask questions and express concerns. Spent 25 minutes addressing these questions and concerns. Patient indicated that she understood and consented to continue. Completed D-KEFS [place] Test. Completed WAIS-IV. Patient took zero Breaks. Ended by discussing and scheduling feedback session. See time breakdown:     Orientation to testing process: 9:00am-9:25am  D-KEFS: 9:25am-9:30am  WAIS-IV: 9:31am-10:44am  Wrap-up and feedback scheduling: 10:44am-10:49am    Assessments completed prior to visit:  The following assessments were completed by patient for this visit:  PHQ9:   PHQ-9 SCORE 4/7/2016 9/13/2022 11/15/2022   PHQ-9 Total Score MyChart - 17 (Moderately severe depression) -   PHQ-9 Total Score 12 17 15     GAD7:   LYUBOV-7 SCORE 4/7/2016 9/13/2022 11/15/2022 12/27/2022   Total Score - 18 (severe anxiety) - 14 (moderate anxiety)   Total Score 9 - 12 14   Some encounter information is confidential and restricted. Go  to Review Flowsheets activity to see all data.     CAGE-AID:   CAGE-AID Total Score 9/13/2022 11/15/2022   Total Score - 3   Total Score MyChart 1 (A total score of 2 or greater is considered clinically significant) -   Some encounter information is confidential and restricted. Go to Review Flowsheets activity to see all data.     PROMIS 10-Global Health (all questions and answers displayed):   PROMIS 10 9/13/2022 11/15/2022   In general, would you say your health is: Good -   In general, would you say your quality of life is: Good -   In general, how would you rate your physical health? Good -   In general, how would you rate your mental health, including your mood and your ability to think? Fair -   In general, how would you rate your satisfaction with your social activities and relationships? Fair -   In general, please rate how well you carry out your usual social activities and roles Fair -   To what extent are you able to carry out your everyday physical activities such as walking, climbing stairs, carrying groceries, or moving a chair? Completely -   How often have you been bothered by emotional problems such as feeling anxious, depressed or irritable? Often -   How would you rate your fatigue on average? Severe -   How would you rate your pain on average?   0 = No Pain  to  10 = Worst Imaginable Pain 0 -   In general, would you say your health is: - 3   In general, would you say your quality of life is: - 3   In general, how would you rate your physical health? - 2   In general, how would you rate your mental health, including your mood and your ability to think? - 2   In general, how would you rate your satisfaction with your social activities and relationships? - 3   In general, please rate how well you carry out your usual social activities and roles. (This includes activities at home, at work and in your community, and responsibilities as a parent, child, spouse, employee, friend, etc.) - 2   To what  extent are you able to carry out your everyday physical activities such as walking, climbing stairs, carrying groceries, or moving a chair? - 5   In the past 7 days, how often have you been bothered by emotional problems such as feeling anxious, depressed, or irritable? - 4   In the past 7 days, how would you rate your fatigue on average? - 3   In the past 7 days, how would you rate your pain on average, where 0 means no pain, and 10 means worst imaginable pain? - 0   Global Mental Health Score - 10   Global Physical Health Score - 15   PROMIS TOTAL - SUBSCORES - 25   Some encounter information is confidential and restricted. Go to Review Flowsheets activity to see all data.   Some recent data might be hidden     [place] Suicide Severity Rating Scale (Lifetime/Recent)  [place] Suicide Severity Rating (Lifetime/Recent) 11/15/2022   1. Wish to be Dead (Lifetime) 0   2. Non-Specific Active Suicidal Thoughts (Lifetime) 0   Actual Attempt (Lifetime) 0   Has subject engaged in non-suicidal self-injurious behavior? (Lifetime) 0   Interrupted Attempts (Lifetime) 0   Aborted or Self-Interrupted Attempt (Lifetime) 0   Preparatory Acts or Behavior (Lifetime) 0   Calculated C-SSRS Risk Score (Lifetime/Recent) No Risk Indicated   Some encounter information is confidential and restricted. Go to Review [employer] activity to see all data.        ASSESSMENT: Current Emotional / Mental Status (status of significant symptoms):   Risk status (Self / Other harm or suicidal ideation)   Patient denies current fears or concerns for personal safety.   Patient denies current or recent suicidal ideation or behaviors.   Patient denies current or recent homicidal ideation or behaviors.   Patient denies current or recent self injurious behavior or ideation.   Patient denies other safety concerns.   Patient reports there has been no change in risk factors since their last session.     Patient reports there has been no change in protective  factors since their last session.     Recommended that patient call 911 or go to the local ED should there be a change in any of these risk factors.     Appearance:   Appropriate    Eye Contact:   Good    Psychomotor Behavior: Normal    Attitude:   Cooperative    Orientation:   All   Speech    Rate / Production: Normal     Volume:  Normal    Mood:    Normal   Affect:    Appropriate    Thought Content:  Clear    Thought Form:  Coherent  Logical    Insight:    Good     Diagnosis:  1. Developmental mental disorder    2. LYUBOV (generalized anxiety disorder)        PLAN:  Clinician will compile data in preparation for final feedback. Feedback session scheduled for 1/19/23.    Latisha Freitas MA  Doctoral Candidate Psychology Practicum Student

## 2023-01-18 ENCOUNTER — OFFICE VISIT (OUTPATIENT)
Dept: FAMILY MEDICINE | Facility: CLINIC | Age: 48
End: 2023-01-18
Payer: COMMERCIAL

## 2023-01-18 VITALS
HEART RATE: 107 BPM | RESPIRATION RATE: 18 BRPM | DIASTOLIC BLOOD PRESSURE: 81 MMHG | TEMPERATURE: 97.3 F | OXYGEN SATURATION: 97 % | WEIGHT: 207 LBS | BODY MASS INDEX: 34.49 KG/M2 | SYSTOLIC BLOOD PRESSURE: 123 MMHG | HEIGHT: 65 IN

## 2023-01-18 DIAGNOSIS — Z87.891 HISTORY OF NICOTINE DEPENDENCE: ICD-10-CM

## 2023-01-18 DIAGNOSIS — F43.22 ADJUSTMENT DISORDER WITH ANXIOUS MOOD: ICD-10-CM

## 2023-01-18 DIAGNOSIS — Z12.4 CERVICAL CANCER SCREENING: ICD-10-CM

## 2023-01-18 DIAGNOSIS — G47.19 EXCESSIVE DAYTIME SLEEPINESS: ICD-10-CM

## 2023-01-18 DIAGNOSIS — Z12.31 ENCOUNTER FOR SCREENING MAMMOGRAM FOR BREAST CANCER: ICD-10-CM

## 2023-01-18 DIAGNOSIS — Z13.220 SCREENING FOR HYPERLIPIDEMIA: ICD-10-CM

## 2023-01-18 DIAGNOSIS — Z12.11 SCREEN FOR COLON CANCER: ICD-10-CM

## 2023-01-18 DIAGNOSIS — Z77.22 SECONDHAND SMOKE EXPOSURE: ICD-10-CM
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
## 2023-01-18 DIAGNOSIS — R41.840 IMPAIRED CONCENTRATION: ICD-10-CM

## 2023-01-18 DIAGNOSIS — Z77.090 ASBESTOS EXPOSURE: ICD-10-CM

## 2023-01-18 DIAGNOSIS — Z23 NEED FOR IMMUNIZATION AGAINST INFLUENZA: ICD-10-CM

## 2023-01-18 DIAGNOSIS — R06.83 SNORING: ICD-10-CM

## 2023-01-18 PROCEDURE — 90471 IMMUNIZATION ADMIN: CPT | Performed by: FAMILY MEDICINE

## 2023-01-18 PROCEDURE — G0145 SCR C/V CYTO,THINLAYER,RESCR: HCPCS | Performed by: FAMILY MEDICINE

## 2023-01-18 PROCEDURE — 90686 IIV4 VACC NO PRSV 0.5 ML IM: CPT | Performed by: FAMILY MEDICINE

## 2023-01-18 PROCEDURE — 99215 OFFICE O/P EST HI 40 MIN: CPT | Mod: 25 | Performed by: FAMILY MEDICINE

## 2023-01-18 PROCEDURE — 87624 HPV HI-RISK TYP POOLED RSLT: CPT | Performed by: FAMILY MEDICINE

## 2023-01-18 ASSESSMENT — ANXIETY QUESTIONNAIRES
8. IF YOU CHECKED OFF ANY PROBLEMS, HOW DIFFICULT HAVE THESE MADE IT FOR YOU TO DO YOUR WORK, TAKE CARE OF THINGS AT HOME, OR GET ALONG WITH OTHER PEOPLE?: SOMEWHAT DIFFICULT
GAD7 TOTAL SCORE: 7
IF YOU CHECKED OFF ANY PROBLEMS ON THIS QUESTIONNAIRE, HOW DIFFICULT HAVE THESE PROBLEMS MADE IT FOR YOU TO DO YOUR WORK, TAKE CARE OF THINGS AT HOME, OR GET ALONG WITH OTHER PEOPLE: SOMEWHAT DIFFICULT
3. WORRYING TOO MUCH ABOUT DIFFERENT THINGS: SEVERAL DAYS
1. FEELING NERVOUS, ANXIOUS, OR ON EDGE: SEVERAL DAYS
GAD7 TOTAL SCORE: 7
7. FEELING AFRAID AS IF SOMETHING AWFUL MIGHT HAPPEN: NOT AT ALL
6. BECOMING EASILY ANNOYED OR IRRITABLE: MORE THAN HALF THE DAYS
7. FEELING AFRAID AS IF SOMETHING AWFUL MIGHT HAPPEN: NOT AT ALL
4. TROUBLE RELAXING: MORE THAN HALF THE DAYS
GAD7 TOTAL SCORE: 7
5. BEING SO RESTLESS THAT IT IS HARD TO SIT STILL: NOT AT ALL
2. NOT BEING ABLE TO STOP OR CONTROL WORRYING: SEVERAL DAYS

## 2023-01-18 NOTE — PROGRESS NOTES
"  1. Impaired concentration  Patient is very anxious to get a diagnosis of ADHD as she believes this will lead to some tools to help her work and also accommodations for the work place.  She is currently unemployed - looking for work, but finding it difficult.  She believes our system has failed in making the diagnosis for her and is seeking attention through other experts - she will continue to work with these experts.     2. Snoring  3. Excessive daytime sleepiness  This patient is concerned by her excessive fatigue during the day - she is noted to be a snorer as well - this is bothersome to her - she would benefit from a sleep evaluation.    - Adult Sleep Eval & Management  Referral; Future    4. Exposure to radon, sequela  5. Asbestos exposure  6. History of nicotine dependence  7. Secondhand smoke exposure  Patient is quite concerned by previous exposures/risks for lung cancer:  Radon (in her family home), secondhand smoke (both parents smoked), asbestos exposure (lived in a home where her roommate (owner of the house) illegally removed мария that contained asbestos, and her own smoking history (smoked x 15 years, but for portions of that was only 1/2 ppd).  We discussed lung cancer screening and current guidelines.  She was quite adamant that she \"doesn't give a shit about statistics\", she only cares about her herself and her assumed risk.  She wants to know how soon and how to do lung cancer screening.  I have referred her to pulmonary medicine to review her history and make recommendations.    - Adult Pulmonary Medicine Referral; Future    8. Severe sun exposure, sequela  Patient is concerned about lifetime severe sun exposure - referred to Derm for skin check.    - Adult Dermatology Referral; Future    9. Need for immunization against influenza  Due for seasonal flu vaccine - ordered.   - INFLUENZA VACCINE IM > 6 MONTHS VALENT IIV4 (AFLURIA/FLUZONE)    10. Screening for hyperlipidemia  Reviewed " "HM - indicates need for lipid screening - can be done at physical     11. Encounter for screening mammogram for breast cancer  Due for breast cancer screening - ordered mammogram  - MA SCREENING DIGITAL BILAT - Future  (s+30); Future    12. Screen for colon cancer  Due for colon cancer screening - ordered  - Colonoscopy Screening  Referral; Future    13. Cervical cancer screening  Due for cervix cancer screening - done today   - Pap Screen with HPV - recommended age 30 - 65 years  - HPV Hold (Lab Only)    14. Adjustment disorder with anxious mood  Patient has significant anxiety - she was very angry that she couldn't schedule a \"physical\" in a longer appointment spot.  Despite my explanations, she is just worried that she only gets 20 minutes for a physical.   Today, we spent 41 minutes together (face to face, additional time spent in documentation) - all in discussion about her health concerns - and did a pap smear/exam as well!  Invited her to make an appointment for physical exam.  Did review HM as well.            Nurys Bustillos is a 47 year old, presenting for the following health issues:  Hypertension and Depression      Has been assessed for the ADHD   Not super hopeful   [facility] isn't so good for this -   Meets criteria by DSM but doesn't have deficits   Went in for another test - at [facility] -   Spoke to a 19 year  at [place] - trying to refer to someone else -   Doesn't think problems are anxiety, but thinks its ADHD   Fits her symptoms -   Wants tools/accommodations   Is on unemployment -     Hasn't been to doctor -   48 yo   Had pap smear - 5 years ago  Mammogram - before COVID      Did have abnormal pap smear as a 19 year old   Not currently sexually active -     Hasn't had mammogram       Struggles with fatigue -   Wonders if related to anxiety   Feeling like waking up tired -   Get in bed at midnight - doesn't fall asleep for hours - therapist told her to get up again if she " wasn't falling asleep - then goes to bed at 4 am  If stays in bed, falls asleep by 2 a.m. - once asleep, sleeps pretty well -  Able to fall back to sleep -   Sleeps for 7-1/2 hours -   Right now, not working -   When working had to be at work by 8:30 a.m. , then would come home and go to sleep     Worked a night shift in early 20s -   Thinks that's what screwed her up    Fatigue even when enough sleep - snores -   Mom just got diagnosed with sleep apnea -       Starts thinking about the day - hard to turn off the thoughts -     Doesn't have health anxiety in general  Is convinced she'll die of lung cancer in the future  She smoked:  15 years - in 20s, 1 ppd; then quit, started again for 8 cigs/day  Parents smoked when she was young - had ear infections a lot, URI a lot; but this got better when managed her allergies better -   Roommate removed asbestos from apartment (illegally) -   House where she grew up had radon - never tested while she was growing up -   No family history of lung cancer; only child      /81 - she thinks it's a little high -   Exercise - bike (stationary)  Walks dog 3x/day   Doesn't want to go back to gym yet   Wants to do free weights -   Likes classes for yoga -     Had skin cancer screening at age 29 - has very fair skin/had a lot of sun exposure (mom had a pool)            History of Present Illness       Mental Health Follow-up:  Patient presents to follow-up on Anxiety.    Patient's anxiety since last visit has been:  Better  The patient is having other symptoms associated with anxiety.  Any significant life events: job concerns and financial concerns  Patient is not feeling anxious or having panic attacks.  Patient has no concerns about alcohol or drug use.    Hypertension: She presents for follow up of hypertension.  She does not check blood pressure  regularly outside of the clinic. Outpatient blood pressures have not been over 140/90. She follows a low salt diet.    Today's LYUBOV-7  "Score: 7             Review of Systems   Constitutional: Positive for fatigue.   Respiratory: Positive for shortness of breath.         Snoring     Cardiovascular: Negative for chest pain and peripheral edema.   Gastrointestinal: Negative.    Genitourinary: Negative.    Musculoskeletal: Negative.    Skin:        H/o sun exposure     Neurological: Negative.    Psychiatric/Behavioral: Positive for decreased concentration and sleep disturbance. The patient is nervous/anxious.    All other systems reviewed and are negative.           Objective    /81 (BP Location: Left arm, Patient Position: Sitting, Cuff Size: Adult Large)   Pulse 107   Temp 97.3  F (36.3  C) (Temporal)   Resp 18   Ht 1.651 m (5' 5\")   Wt 93.9 kg (207 lb)   LMP 01/07/2023   SpO2 97%   BMI 34.45 kg/m    Body mass index is 34.45 kg/m .  Physical Exam  Vitals reviewed.   Constitutional:       General: She is not in acute distress.     Appearance: Normal appearance.   HENT:      Head: Normocephalic.      Right Ear: Tympanic membrane, ear canal and external ear normal.      Left Ear: Tympanic membrane, ear canal and external ear normal.      Nose: Nose normal.      Mouth/Throat:      Mouth: Mucous membranes are moist.      Pharynx: No posterior oropharyngeal erythema.   Eyes:      Extraocular Movements: Extraocular movements intact.      Conjunctiva/sclera: Conjunctivae normal.      Pupils: Pupils are equal, round, and reactive to light.   Cardiovascular:      Rate and Rhythm: Normal rate and regular rhythm.      Pulses: Normal pulses.      Heart sounds: Normal heart sounds. No murmur heard.  Pulmonary:      Effort: Pulmonary effort is normal.      Breath sounds: Normal breath sounds.   Abdominal:      Palpations: Abdomen is soft. There is no mass.      Tenderness: There is no abdominal tenderness. There is no guarding or rebound.   Musculoskeletal:         General: No deformity. Normal range of motion.      Cervical back: Normal range of " motion and neck supple.   Lymphadenopathy:      Cervical: No cervical adenopathy.   Skin:     General: Skin is warm and dry.   Neurological:      General: No focal deficit present.      Mental Status: She is alert.   Psychiatric:         Mood and Affect: Mood normal.            Results for orders placed or performed in visit on 01/18/23   Pap Screen with HPV - recommended age 30 - 65 years     Status: None   Result Value Ref Range    Interpretation        Negative for Intraepithelial Lesion or Malignancy (NILM)    Comment         Papanicolaou Test Limitations:  Cervical cytology is a screening test with limited sensitivity, and regular screening is critical for cancer prevention.  Pap tests are primarily effective for the diagnosis/prevention of squamous cell carcinoma, not adenocarcinoma or other cancers.        Specimen Adequacy       Satisfactory for evaluation, endocervical/transformation zone component absent    Clinical Information       none      Reflex Testing Yes regardless of result     Previous Abnormal?       No      Performing Labs       The technical component of this testing was completed at [facility]

## 2023-01-19 ENCOUNTER — VIRTUAL VISIT (OUTPATIENT)
Dept: PSYCHOLOGY | Facility: CLINIC | Age: 48
End: 2023-01-19
Payer: COMMERCIAL

## 2023-01-19 DIAGNOSIS — F41.1 GAD (GENERALIZED ANXIETY DISORDER): ICD-10-CM

## 2023-01-19 DIAGNOSIS — F90.0 ADHD (ATTENTION DEFICIT HYPERACTIVITY DISORDER), INATTENTIVE TYPE: Primary | ICD-10-CM

## 2023-01-19 PROCEDURE — 96136 PSYCL/NRPSYC TST PHY/QHP 1ST: CPT | Mod: 95

## 2023-01-19 PROCEDURE — 96131 PSYCL TST EVAL PHYS/QHP EA: CPT | Mod: 95

## 2023-01-19 PROCEDURE — 96130 PSYCL TST EVAL PHYS/QHP 1ST: CPT | Mod: 95

## 2023-01-19 PROCEDURE — 96137 PSYCL/NRPSYC TST PHY/QHP EA: CPT | Mod: 95

## 2023-01-19 NOTE — PROGRESS NOTES
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 function at home, work, and in social relationships are significantly impaired. Further, her self-reported symptoms on Teresa measures of ADHD symptoms were consistent with this information. Both her coworker and father reported to observe significant symptoms in her currently and as a child.     An objective measure of neurocognitive functioning was administered. Results first indicated verbal memory to be scored in the average range and visual memory to be scored in the average range.  Indeed, the patient was able to recognize words from a target list in the aaverage range immediately and in the above range after a delay. She was able to recognize shapes immediately in the low range and after a delay in the average range. Reasoning capabilities were scored to be in the above range, indicating the patient was able to solve nonverbal puzzle matrices more effectively than peers. On the Stroop test, the patient responded to stimuli in a timely manner and scored in the average range for her simple reaction time, and in the above range for her complex and stroop reaction times. On a shifting attention test, the patient was able to adjust her responses according to changing rules sets in the above range.  On a continuous performance test, the patient was able to thong her attention and resist making impulsive mistakes.  Her processing speed overall was scored to be in  the average range.  ADHD is associated with significant deficits in complex attention, cognitive flexibility, processing speed, executive functioning, and simple attention capabilities as measured on CNS Vital Signs.  The patient demonstrated Average scores in complex attention, processing speed, cognitive flexibility, executive function, and simple attention. She did not show significant impairments in the domains associated with ADHD. However upon reviewing this with the client, she did not feel these scores accurately reflected her experience and so the provider scheduled the client to be administered the WAIS-IV to clear up these discrepancies.    Intelligence testing indicted significant discrepancies is capabilities. Higher-order cognitive capabilities are comprised of verbal comprehension and nonverbal reasoning. Indeed the patient was able to define vocabulary words, demonstrate a fund of knowledge, and articulate abstract similarities between concepts in the superior range. She was able to solve nonverbal puzzles in the very superior range. These capabilities are personal and normative strengths for the patient. Second-order cognitive capabilities are comprised of working memory and processing speed. The patient was able to hold information in mind for short-term manipulation in the high average range. Her processing speed was also scored to be in the high average range. The patient's performance on the D-KEFS Aptos Test was consistent with her working memory performance on intelligence testing. ADHD is associated with significant discrepancies between higher-order and second-order cognitive capabilities. Her personal weakness in working memory as measured by the WAIS-IV and D-KEFS is consistent with this presentation. Therefore, current problems can be conceptualized as having a neurodevelopmental basis, as other mental health diagnoses do not account for these differences. See recommendations  below.    Referral Question Response: DSM-5 criteria for ADHD:   A. Symptom Count - Are there sufficient symptoms for the diagnosis? Yes, patient did endorse sufficient symptoms.   B. Onset - Were several symptoms present before 11 years of age? Yes, symptoms reportedly began in childhood.   C. Pervasiveness - Are several symptoms present in at least two settings? Yes, patient reported that symptoms are problematic at home, social relationships, and work.   D. Impairment - Do symptoms interfere with or reduce the quality of functioning? Yes, patient is unable to complete daily tasks effectively.   E. Exclusions - Are symptoms better explained by another disorder or factor? No, symptoms are best explained by ADHD. The patient's symptoms may be made worse by anxiety symptoms, but she does seem to have an organic basis of inattention.     The patient meets the following DSM-5 criteria for generalized anxiety disorder:  A. Excessive anxiety and worry, occurring more days than not for at least 6 months about a number of events or activities.   B. The individual finds it difficult to control the worry.  C. The anxiety and worry are associated with 3 or more of 6 symptoms.  D. The anxiety, worry, or physical symptoms cause clinically significant distress or impairment in social, occupational, or other important areas of functioning.  E. The disturbance is not attributable to the physiological effects of a substance (e.g., a drug of abuse, a medication) or another medical condition (e.g., hyperthyroidism).  F. The disturbance is not better explained by another mental disorder.    DIAGNOSES:  F90.0 Attention-Deficit/Hyperactivity Disorder, inattentive presentation, severe  F41.1 Generalized Anxiety Disorder    PLAN OF CARE:  1. Discuss the following with your primary care provider:  a. Consider a trial of a stimulant medication. This may help alleviate some of the patient's attentional symptoms.   b. Consider a trial of a  psychotropic medication. This may help alleviate some of the patient's anxiety symptoms.     2. Consider initiating individual psychotherapy to help alleviate mood symptoms. Research indicates that outcomes are best with both medication and therapy. You can call the  [facility] line at [phone]. TYE Conway is recommended specifically because she works with individuals with ADHD.    RECOMMENATIONS:  1. Due to the patient's reported attention, concentration, and mood difficulties, the following health/lifestyle changes when combined, can significantly improve symptoms:   a. Avoid simple carbohydrates at breakfast. Aim for only complex carbohydrates and lean protein for your morning meal.   b. Engage in aerobic exercise 3 times per week for 30 minutes, ensuring that your heart rate stays within your training zone. Further, reading the book,  Spark,  by Domenic Huntley M.D can help the patient understand the benefits of exercise on the brain.   c. Research suggest that taking a high-quality multi-vitamin and antioxidant (1/2 cup of blueberries) daily in conjunction with balanced nutrition can be helpful.  d. Aim for the high end of daily water intake: around 72 ounces per day.  e. Ensure regular meals and snacks to maintain optimal attention.    2. The following may be beneficial in managing some of the patient's attention and concentration difficulties:  a. Due to the patient's difficulties with attention and concentration, consider working in a completely distraction-free area while completing tasks. Workspaces should be completely clear except for the materials needed for the current task. Both visual and auditory distractions should be decreased as much as possible.  b. Considering decreased ability to focus and maintain attention, it is recommended that the patient take frequent breaks while completing tasks. This will help to maintain attention and effort. The patient may  benefit from the use of a Socialeyes App Timer. The timer works by using built-in break times. After working on a task consistently for 25 minutes, the timer reminds the user to take a five-minute break before continuing, etc. A [employer]ik Storesoro timer can be downloaded as a free jackson to a phone or tablet.  c. Due to the patient's attentional and concentration symptoms, it is recommended to increase organization with the use of lists and calendars. Significantly increasing structure to the day and adhering to a set schedule can increase your ability to complete responsibilities, track deadline, etc. Breaking these tasks down into their component parts and recording them in a calendar/planner will likely be beneficial. Patient would benefit from setting feasible timelines for completion of activities. By establishing clear priorities for completing tasks, you can more likely complete the most important tasks first. The patient may also choose to elect to a friend or family member to help hold them accountable.    3. Avoid multitasking. Attempting to work on multiple tasks and projects the same increases the likelihood that an error will occur. Focus on one task at a time.    4. Due to the patient's reported difficulties with attention, it is recommended to consistently take thorough notes in meetings where it is warranted. The patient may consider using a smartpen to take these notes. A smartpen is able to capture audio and transfer written notes into a digital document. You can learn more about smartpens at www.[employer].    5. Due to the patient's difficulties with sleep, it recommended to engage in a relaxing activity up to the point of sleep. The patient may want to spend time reading, drawing/ coloring, practicing mindfulness, listening (not watching) to podcasts, music, etc., or try the GeneCapture jackson, which is free to download and may aid falling asleep more easily.    6. The patient may benefit from engaging in  "mindfulness practices. This may include breathing techniques, apps that provide guided meditation, or more interactive activities such as coloring.    7. Develop a \"coping skills jar/box.\" This entails designating a certain container to hold slips of paper with distraction technique ideas written on each slip of paper. Distraction techniques may include listening to a certain type of music, playing on game on your phone, doing a breathing exercise, spending time with a pet, calling a certain individual, looking at a magazine, working on a puzzle, etc. When feeling distressed, choose a slip of paper from the container and engage in that activity rather than focusing on the problem.    8. Patient may need to negotiate with their employer for more frequent breaks or be allowed to move around more than is typical.    9. See the attached tip sheet for more ideas as well as online and book resources.       Latisha Freitas MA  Doctoral Candidate Psychology Practicum Student    "
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
## 2023-01-20 DIAGNOSIS — Z87.891 HISTORY OF NICOTINE DEPENDENCE: Primary | ICD-10-CM

## 2023-01-20 DIAGNOSIS — Z77.090 ASBESTOS EXPOSURE: ICD-10-CM

## 2023-01-20 DIAGNOSIS — Z77.22 SECONDHAND SMOKE EXPOSURE: ICD-10-CM

## 2023-01-20 LAB
BKR LAB AP GYN ADEQUACY: NORMAL
BKR LAB AP GYN INTERPRETATION: NORMAL
BKR LAB AP HPV REFLEX: NORMAL
BKR LAB AP PREVIOUS ABNORMAL: NORMAL
PATH REPORT.COMMENTS IMP SPEC: NORMAL
PATH REPORT.COMMENTS IMP SPEC: NORMAL
PATH REPORT.RELEVANT HX SPEC: NORMAL

## 2023-01-21 ASSESSMENT — ENCOUNTER SYMPTOMS
GASTROINTESTINAL NEGATIVE: 1
MUSCULOSKELETAL NEGATIVE: 1
SLEEP DISTURBANCE: 1
DECREASED CONCENTRATION: 1
NEUROLOGICAL NEGATIVE: 1
SHORTNESS OF BREATH: 1
FATIGUE: 1
NERVOUS/ANXIOUS: 1

## 2023-01-24 LAB
HUMAN PAPILLOMA VIRUS 16 DNA: NEGATIVE
HUMAN PAPILLOMA VIRUS 18 DNA: NEGATIVE
HUMAN PAPILLOMA VIRUS FINAL DIAGNOSIS: NORMAL
HUMAN PAPILLOMA VIRUS OTHER HR: NEGATIVE

## 2023-01-25 PROBLEM — Z87.42 HX OF ABNORMAL CERVICAL PAP SMEAR: Status: ACTIVE | Noted: 2023-01-25

## 2023-01-26 NOTE — PROGRESS NOTES
[facility]   Mental Health & Addiction Services     Progress Note - ADHD Feedback Session     Patient Name: Tressa Jeong  Date: 2023       Service Type:  Individual       Session Start Time: 11:00am  Session End Time:  11:32am     Session Length: 32 minutes    Session #: 3    Attendees: Patient attended alone    Service Modality: Video Visit:      Provider verified identity through the following two step process.  Patient provided:  Patient     Telemedicine Visit: The patient's condition can be safely assessed and treated via synchronous audio and visual telemedicine encounter.      Reason for Telemedicine Visit: Services only offered telehealth    Originating Site (Patient Location): Patient's home    Distant Site (Provider Location): [facility] MENT[facility] HE[facility] & [facility]    Consent:  The patient/guardian has verbally consented to: the potential risks and benefits of telemedicine (video visit) versus in person care; bill my insurance or make self-payment for services provided; and responsibility for payment of non-covered services.     Patient would like the video invitation sent by:  Send to e-mail at: [email or web address]    Mode of Communication:  Video Conference via Amwell    Distant Location (Provider):  On-site    As the provider I attest to compliance with applicable laws and regulations related to telemedicine.      PHQ 2016 2022 11/15/2022   PHQ-9 Total Score 12 17 15   Q9: Thoughts of better off dead/self-harm past 2 weeks Not at all Not at all Not at all       LYUBOV-7 SCORE 11/15/2022 2022 2023   Total Score - 14 (moderate anxiety) 7 (mild anxiety)   Total Score 12 14 7   Some encounter information is confidential and restricted. Go to Review Flowsheets activity to see all data.         DATA      Progress Since Last Session (Related to Symptoms / Goals / Homework):   Symptoms:  Stable.    Homework: Completed.      Treatment Objective(s) Addressed in This Session:   Provided feedback on ADHD evaluation. Reviewed test results in depth. Plan of care and recommendations were discussed based on testing data. See full report attached on secondary note in this encounter.     Intervention:   Provided feedback to patient regarding testing results, diagnoses, and treatment recommendations. Test results are consistent with an ADHD diagnosis. Symptoms are not better explained by an anxiety disorder. Personalized suggestions regarding symptoms were offered. Patient had the opportunity to ask questions; she expressed understanding.        ASSESSMENT: Current Emotional / Mental Status (status of significant symptoms):   Risk status (Self / Other harm or suicidal ideation)   Patient denies current fears or concerns for personal safety.   Patient denies current or recent suicidal ideation or behaviors.   Patient denies current or recent homicidal ideation or behaviors.   Patient denies current or recent self injurious behavior or ideation.   Patient denies other safety concerns.   Patient reports there has been no change in risk factors since their last session.     Patient reports there has been no change in protective factors since their last session.     Recommended that patient call 911 or go to the local ED should there be a change in any of these risk factors.     Appearance:   Appropriate    Eye Contact:   Good    Psychomotor Behavior: Normal    Attitude:   Cooperative    Orientation:   All   Speech    Rate / Production: Normal     Volume:  Normal    Mood:    Normal   Affect:    Appropriate    Thought Content:  Clear    Thought Form:  Coherent  Logical    Insight:    Good      Medication Review:   No changes to current psychiatric medication(s)     Medication Compliance:   Yes     Changes in Health Issues:   None reported     Chemical Use Review:   Substance Use: Chemical use reviewed, no active  concerns identified      Nicotine Use: No current tobacco use.      Diagnosis:  1. ADHD (attention deficit hyperactivity disorder), inattentive type    2. LYUBOV (generalized anxiety disorder)        PLAN:   Recommendations are outlined in full evaluation report (attached to this encounter).   Patient indicated understanding and will contact the clinic if there are further questions.    Parts of this documentation may have been completed using dictation software. Potential errors may result and are unintentional.       Latisha Freitas Ma  Doctoral Candidate Psychology Practicum Student         Psychological Testing Services Summary       Testing Evaluation Services Base: 16287  (first 60 mins) Add-on: 99991  (each addtl 60 mins)   Record Review and Clarify Referral Question   11:00am-11:10am on 11/15/22 10 minutes   Clinical Decision Making/Battery Modification   1:00pm-1:15pm on 11/22/22 15 minutes   Integration/Report Generation   7:00am-8:00am on 12/27/22 ([place])  7:00am-8:00am on 11/22/22 (CNS Vital Signs)  7:00am-7:20am on 1/19/2023 (WAIS-IV)  7:20am-7:30am on 1/19/2023 (DKEFS)  10:00am-11:00am on 1/19/23 (Final Report)   60 minutes  30 minutes  20 minutes  5 minutes  60 minutes   Interactive Feedback Session   11:00am-11:32am on 1/19/23 32 minutes   Post-Service Work   11:45am-12:00pm on 1/19/23 15 minutes   Total Time: 247 minutes   Total Units: 1 3       Test Administration and Scoring Base: 82107  (first 30 mins) Add-on: 57185  (each addtl 30 mins)   Test Administration (Face-to-Face)  9:31am-10:44am on 1/12/23 (WAIS-IV)  9:25am-9:30am on 1/12/23 (D-KEFS)   73 minutes  5 minutes   Scoring (Non-Face-to-Face)   2:00pm-2:05pm on 1/12/23 (WAIS-IV)  2:06pm-2:12pm on 1/12/23 (D-KEFS)   5 minutes  6 minutes   Total Time:  89 minutes   Total Units: 1 2       Diagnoses:   F90.0 ADHD Inattentive Type  F41.1 Generalized Anxiety Disorder

## 2023-02-06 ENCOUNTER — TELEPHONE (OUTPATIENT)
Dept: FAMILY MEDICINE | Facility: CLINIC | Age: 48
End: 2023-02-06
Payer: COMMERCIAL

## 2023-02-06 NOTE — TELEPHONE ENCOUNTER
Severiano Vegas,   3rd request to review and advise.     Kenia Melara, RN   1/30/2023  6:51 AM CST       Severiano Vegas,   2nd request to review and advise.     Kenia Melara, RN   1/25/2023 11:22 AM CST       Hi Dr. Vegas,   47 with a NIL Pap, Neg HR HPV. See her pap hx listed below. Would you recommend a cotest in 3 or 5 years now? Please advise.      Pap Hx:  Hx of abnormal pap as a 18 yo, no results to review  09/24/07 NIL Pap  10/30/18 NIL Pap, Neg HR HPV  01/18/23 NIL Pap, Neg HR HPV (current)

## 2023-02-07 NOTE — TELEPHONE ENCOUNTER
"I think I've answered this question - I was on vacation for 2 weeks, so was away from my \"work\".    "

## 2023-03-21 ENCOUNTER — OFFICE VISIT (OUTPATIENT)
Dept: PULMONOLOGY | Facility: CLINIC | Age: 48
End: 2023-03-21
Payer: COMMERCIAL

## 2023-03-21 ENCOUNTER — ALLIED HEALTH/NURSE VISIT (OUTPATIENT)
Dept: PULMONOLOGY | Facility: CLINIC | Age: 48
End: 2023-03-21
Payer: COMMERCIAL

## 2023-03-21 VITALS
BODY MASS INDEX: 34.32 KG/M2 | DIASTOLIC BLOOD PRESSURE: 80 MMHG | OXYGEN SATURATION: 97 % | HEART RATE: 80 BPM | WEIGHT: 206 LBS | SYSTOLIC BLOOD PRESSURE: 128 MMHG | HEIGHT: 65 IN

## 2023-03-21 DIAGNOSIS — Z87.891 HISTORY OF NICOTINE DEPENDENCE: ICD-10-CM

## 2023-03-21 DIAGNOSIS — Z77.22 SECONDHAND SMOKE EXPOSURE: ICD-10-CM
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
## 2023-03-21 DIAGNOSIS — Z77.090 ASBESTOS EXPOSURE: ICD-10-CM

## 2023-03-21 LAB — HGB BLD-MCNC: 12.6 G/DL

## 2023-03-21 PROCEDURE — 94729 DIFFUSING CAPACITY: CPT | Performed by: INTERNAL MEDICINE

## 2023-03-21 PROCEDURE — 94726 PLETHYSMOGRAPHY LUNG VOLUMES: CPT | Performed by: INTERNAL MEDICINE

## 2023-03-21 PROCEDURE — 99204 OFFICE O/P NEW MOD 45 MIN: CPT | Performed by: INTERNAL MEDICINE

## 2023-03-21 PROCEDURE — 94375 RESPIRATORY FLOW VOLUME LOOP: CPT | Performed by: INTERNAL MEDICINE

## 2023-03-21 PROCEDURE — 85018 HEMOGLOBIN: CPT | Mod: QW

## 2023-03-21 RX ORDER — BUPROPION HYDROCHLORIDE 150 MG/1
1 TABLET ORAL
COMMUNITY
Start: 2023-02-17 | End: 2023-03-21

## 2023-03-21 RX ORDER — METHYLPHENIDATE HYDROCHLORIDE 18 MG/1
18 TABLET, EXTENDED RELEASE ORAL EVERY MORNING
COMMUNITY
Start: 2023-03-15 | End: 2023-09-22
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
## 2023-03-21 NOTE — PATIENT INSTRUCTIONS
1) You are not yet eligible for lung cancer screening  2) When you hit 49, you can find us or your primary to order this test  3) There is no clear screening guidelines for asbestos or radon induced lung disease/cancer.  4) I applaud your stopping smoking

## 2023-03-22 LAB
DLCOCOR-%PRED-PRE: 115 %
DLCOCOR-PRE: 25.37 ML/MIN/MMHG
DLCOUNC-%PRED-PRE: 112 %
DLCOUNC-PRE: 24.73 ML/MIN/MMHG
DLCOUNC-PRED: 21.95 ML/MIN/MMHG
ERV-%PRED-PRE: 94 %
ERV-PRE: 0.61 L
ERV-PRED: 0.65 L
EXPTIME-PRE: 7.1 SEC
FEF2575-%PRED-PRE: 92 %
FEF2575-PRE: 2.54 L/SEC
FEF2575-PRED: 2.74 L/SEC
FEFMAX-%PRED-PRE: 93 %
FEFMAX-PRE: 6.5 L/SEC
FEFMAX-PRED: 6.95 L/SEC
FEV1-%PRED-PRE: 102 %
FEV1-PRE: 2.79 L
FEV1FEV6-PRE: 79 %
FEV1FEV6-PRED: 83 %
FEV1FVC-PRE: 79 %
FEV1FVC-PRED: 82 %
FEV1SVC-PRE: 80 %
FEV1SVC-PRED: 74 %
FIFMAX-PRE: 3.53 L/SEC
FRCPLETH-%PRED-PRE: 79 %
FRCPLETH-PRE: 2.18 L
FRCPLETH-PRED: 2.75 L
FVC-%PRED-PRE: 105 %
FVC-PRE: 3.54 L
FVC-PRED: 3.34 L
IC-%PRED-PRE: 95 %
IC-PRE: 2.86 L
IC-PRED: 3 L
RVPLETH-%PRED-PRE: 89 %
RVPLETH-PRE: 1.56 L
RVPLETH-PRED: 1.76 L
TLCPLETH-%PRED-PRE: 98 %
TLCPLETH-PRE: 5.04 L
TLCPLETH-PRED: 5.11 L
VA-%PRED-PRE: 89 %
VA-PRE: 4.59 L
VC-%PRED-PRE: 95 %
VC-PRE: 3.47 L
VC-PRED: 3.65 L

## 2023-04-02 ENCOUNTER — HEALTH MAINTENANCE LETTER (OUTPATIENT)
Age: 48
End: 2023-04-02

## 2023-06-07 ENCOUNTER — OFFICE VISIT (OUTPATIENT)
Dept: FAMILY MEDICINE | Facility: CLINIC | Age: 48
End: 2023-06-07
Payer: COMMERCIAL

## 2023-06-07 ENCOUNTER — ANCILLARY PROCEDURE (OUTPATIENT)
Dept: GENERAL RADIOLOGY | Facility: CLINIC | Age: 48
End: 2023-06-07
Attending: FAMILY MEDICINE
Payer: COMMERCIAL

## 2023-06-07 VITALS
BODY MASS INDEX: 33.66 KG/M2 | SYSTOLIC BLOOD PRESSURE: 114 MMHG | OXYGEN SATURATION: 98 % | HEART RATE: 93 BPM | DIASTOLIC BLOOD PRESSURE: 77 MMHG | HEIGHT: 65 IN | RESPIRATION RATE: 16 BRPM | WEIGHT: 202 LBS | TEMPERATURE: 99.3 F

## 2023-06-07 DIAGNOSIS — Z12.11 SCREEN FOR COLON CANCER: ICD-10-CM

## 2023-06-07 DIAGNOSIS — S99.921A INJURY OF RIGHT FOOT, INITIAL ENCOUNTER: ICD-10-CM

## 2023-06-07 DIAGNOSIS — S99.921A INJURY OF RIGHT FOOT, INITIAL ENCOUNTER: Primary | ICD-10-CM

## 2023-06-07 DIAGNOSIS — Z13.220 SCREENING FOR HYPERLIPIDEMIA: ICD-10-CM

## 2023-06-07 PROCEDURE — 73630 X-RAY EXAM OF FOOT: CPT | Mod: TC | Performed by: RADIOLOGY

## 2023-06-07 PROCEDURE — 99214 OFFICE O/P EST MOD 30 MIN: CPT | Performed by: FAMILY MEDICINE

## 2023-06-15 ENCOUNTER — OFFICE VISIT (OUTPATIENT)
Dept: DERMATOLOGY | Facility: CLINIC | Age: 48
End: 2023-06-15
Attending: FAMILY MEDICINE
Payer: COMMERCIAL

## 2023-06-15 DIAGNOSIS — D22.9 MULTIPLE BENIGN NEVI: ICD-10-CM
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
## 2023-06-15 DIAGNOSIS — D18.01 CHERRY ANGIOMA: ICD-10-CM
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
## 2023-06-15 DIAGNOSIS — L81.4 SOLAR LENTIGO: ICD-10-CM

## 2023-06-15 PROCEDURE — 99203 OFFICE O/P NEW LOW 30 MIN: CPT | Performed by: DERMATOLOGY

## 2023-06-15 RX ORDER — FLUOCINONIDE 0.5 MG/G
OINTMENT TOPICAL
Qty: 60 G | Refills: 2 | Status: SHIPPED | OUTPATIENT
Start: 2023-06-15

## 2023-06-15 RX ORDER — BUPROPION HYDROCHLORIDE 150 MG/1
TABLET ORAL
COMMUNITY
Start: 2023-05-17

## 2023-06-15 RX ORDER — 1.1% SODIUM FLUORIDE PRESCRIPTION DENTAL CREAM 5 MG/G
CREAM DENTAL
COMMUNITY
Start: 2023-04-30

## 2023-06-15 ASSESSMENT — PAIN SCALES - GENERAL: PAINLEVEL: NO PAIN (0)

## 2023-06-15 NOTE — NURSING NOTE
Dermatology Rooming Note    Tressa Jeong's goals for this visit include:   Chief Complaint   Patient presents with     Derm Problem     FBSE: mole on back, has numerous moles     Margi Leiva LPN
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
## 2023-06-15 NOTE — PATIENT INSTRUCTIONS
Patient Education     Checking for Skin Cancer  You can find cancer early by checking your skin each month. There are 3 kinds of skin cancer. They are melanoma, basal cell carcinoma, and squamous cell carcinoma. Doing monthly skin checks is the best way to find new marks or skin changes. Follow the instructions below for checking your skin.   The ABCDEs of checking moles for melanoma   Check your moles or growths for signs of melanoma using ABCDE:   Asymmetry: the sides of the mole or growth don t match  Border: the edges are ragged, notched, or blurred  Color: the color within the mole or growth varies  Diameter: the mole or growth is larger than 6 mm (size of a pencil eraser)  Evolving: the size, shape, or color of the mole or growth is changing (evolving is not shown in the images below)    Checking for other types of skin cancer  Basal cell carcinoma or squamous cell carcinoma have symptoms such as:     A spot or mole that looks different from all other marks on your skin  Changes in how an area feels, such as itching, tenderness, or pain  Changes in the skin's surface, such as oozing, bleeding, or scaliness  A sore that does not heal  New swelling or redness beyond the border of a mole    Who s at risk?  Anyone can get skin cancer. But you are at greater risk if you have:   Fair skin, light-colored hair, or light-colored eyes  Many moles or abnormal moles on your skin  A history of sunburns from sunlight or tanning beds  A family history of skin cancer  A history of exposure to radiation or chemicals  A weakened immune system  If you have had skin cancer in the past, you are at risk for recurring skin cancer.   How to check your skin  Do your monthly skin checkups in front of a full-length mirror. Check all parts of your body, including your:   Head (ears, face, neck, and scalp)  Torso (front, back, and sides)  Arms (tops, undersides, upper, and lower armpits)  Hands (palms, backs, and fingers, including  under the nails)  Buttocks and genitals  Legs (front, back, and sides)  Feet (tops, soles, toes, including under the nails, and between toes)  If you have a lot of moles, take digital photos of them each month. Make sure to take photos both up close and from a distance. These can help you see if any moles change over time.   Most skin changes are not cancer. But if you see any changes in your skin, call your doctor right away. Only he or she can diagnose a problem. If you have skin cancer, seeing your doctor can be the first step toward getting the treatment that could save your life.   [employer] last reviewed this educational content on 4/1/2019 2000-2020 [employer]. [address] [place] [address], [place] [address]. All rights reserved. This information is not intended as a substitute for professional medical care. Always follow your healthcare professional's instructions.       When should I call my doctor?  If you are worsening or not improving, please, contact us or seek urgent care as noted below.     Who should I call with questions (adults)?  [facility] (adult and pediatric): [phone]  [facility] (adult): [phone]  For urgent needs outside of business hours call the [facility] at [phone] and ask for the dermatology resident on call to be paged  If this is a medical emergency and you are unable to reach an ER, Call [phone]    Who should I call with questions (pediatric)?  [facility]- Pediatric Dermatology  Dr. Kasie Fallon, Dr. Randal Alatorre, Dr. Theodora Gutiérrez, BEVERLEY Aguilar, Dr. Carlyn Hill, Dr. Michelle Aguirre & Dr. Isidro De Jesus  Non-urgent nurse triage line; [phone]- [facility] Care Coordinatorcallie Felder (/Complex ) [phone]    If you need a prescription refill, please contact your pharmacy. Refills are approved or denied by our  Physicians during normal business hours, Monday through Fridays  Per office policy, refills will not be granted if you have not been seen within the past year (or sooner depending on your child's condition)    Scheduling Information:  Pediatric Appointment Scheduling and Call Center [phone]  Radiology Scheduling- [phone]  Sedation Unit Scheduling- [phone]  [place] Scheduling- General [phone]; Pediatric Dermatology [phone]  Main  Services: [phone]  Uzbek: [phone]  Guinean: [phone]  Hmong/[place] Islander/Yakut: [phone]  Preadmission Nursing Department Fax Number: [phone] (Fax all pre-operative paperwork to this number)    For urgent matters arising during evenings, weekends, or holidays that cannot wait for normal business hours please call [phone] and ask for the dermatology resident on call to be paged.
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
## 2023-06-20 ENCOUNTER — TELEPHONE (OUTPATIENT)
Dept: DERMATOLOGY | Facility: CLINIC | Age: 48
End: 2023-06-20
Payer: COMMERCIAL

## 2023-06-20 DIAGNOSIS — L30.9 DERMATITIS: Primary | ICD-10-CM

## 2023-06-20 NOTE — TELEPHONE ENCOUNTER
Prior Authorization Retail Medication Request    Medication/Dose: fluocinonide (LIDEX) 0.05 % external ointment  ICD code (if different than what is on RX):  Arthropod bite, initial encounter [W57.XXXA]  - Primary   Previously Tried and Failed:  See chart  Rationale:      Insurance Name:  [employer]  Insurance ID:  [id number]      Key: [id number]

## 2023-06-23 NOTE — TELEPHONE ENCOUNTER
PA Initiation    Medication: FLUOCINONIDE 0.05 % EX OINT  Insurance Company: [employer] ([facility]) - Phone [phone] Fax [phone]  Pharmacy Filling the Rx: [id number] - [place], [place] - [address]  Filling Pharmacy Phone: [phone]  Filling Pharmacy Fax:    Start Date: 6/23/2023

## 2023-06-26 NOTE — TELEPHONE ENCOUNTER
PRIOR AUTHORIZATION DENIED    Medication: FLUOCINONIDE 0.05 % EX OINT    Insurance Company: [employer] ([facility]) - Phone [phone] Fax [phone]    Denial Date: 6/23/2023    Denial Rational: Patient needs to try and fail 2 preferred medications: : hydrocortisone acetate external, betamethasone dipropionate external (lotion, ointment), clobetasol propionate (cream, ointment, solution), fluocinolone acetonide external ointment, fluocinonide external cream, halobetasol propionate external cream, triamcinolone acetonide (external cream, lotion).    Appeal Information:

## 2023-06-27 NOTE — TELEPHONE ENCOUNTER
Leonel Cartagena MD Crawford, Aaliyah, IRENE; [facility] D[facility]at[facility] Adult [facility] 2 hours ago (10:49 AM)     MM  I sent over betamethasone 0.05% ointment as alternative. Could we let her know?   Thanks!     Leonel Cartagena MD

## 2023-06-28 NOTE — TELEPHONE ENCOUNTER
LVM requesting patient call back or read Ryla message. See Ryla encounter from 6/28/2023 for more information.    Javi Diaz, EMT

## 2023-07-11 ASSESSMENT — PATIENT HEALTH QUESTIONNAIRE - PHQ9
SUM OF ALL RESPONSES TO PHQ QUESTIONS 1-9: 4
SUM OF ALL RESPONSES TO PHQ QUESTIONS 1-9: 4
10. IF YOU CHECKED OFF ANY PROBLEMS, HOW DIFFICULT HAVE THESE PROBLEMS MADE IT FOR YOU TO DO YOUR WORK, TAKE CARE OF THINGS AT HOME, OR GET ALONG WITH OTHER PEOPLE: SOMEWHAT DIFFICULT

## 2023-07-12 ENCOUNTER — VIRTUAL VISIT (OUTPATIENT)
Dept: PSYCHOLOGY | Facility: CLINIC | Age: 48
End: 2023-07-12
Payer: COMMERCIAL

## 2023-07-12 DIAGNOSIS — F41.1 GAD (GENERALIZED ANXIETY DISORDER): ICD-10-CM

## 2023-07-12 DIAGNOSIS — F90.0 ADHD (ATTENTION DEFICIT HYPERACTIVITY DISORDER), INATTENTIVE TYPE: Primary | ICD-10-CM

## 2023-07-12 PROCEDURE — 90834 PSYTX W PT 45 MINUTES: CPT | Mod: VID

## 2023-07-12 ASSESSMENT — ANXIETY QUESTIONNAIRES
6. BECOMING EASILY ANNOYED OR IRRITABLE: MORE THAN HALF THE DAYS
5. BEING SO RESTLESS THAT IT IS HARD TO SIT STILL: SEVERAL DAYS
2. NOT BEING ABLE TO STOP OR CONTROL WORRYING: NOT AT ALL
4. TROUBLE RELAXING: SEVERAL DAYS
GAD7 TOTAL SCORE: 6
7. FEELING AFRAID AS IF SOMETHING AWFUL MIGHT HAPPEN: NOT AT ALL
3. WORRYING TOO MUCH ABOUT DIFFERENT THINGS: SEVERAL DAYS
GAD7 TOTAL SCORE: 6
IF YOU CHECKED OFF ANY PROBLEMS ON THIS QUESTIONNAIRE, HOW DIFFICULT HAVE THESE PROBLEMS MADE IT FOR YOU TO DO YOUR WORK, TAKE CARE OF THINGS AT HOME, OR GET ALONG WITH OTHER PEOPLE: SOMEWHAT DIFFICULT
1. FEELING NERVOUS, ANXIOUS, OR ON EDGE: SEVERAL DAYS

## 2023-07-12 NOTE — PROGRESS NOTES
[facility]                                     Progress Note    Patient Name: Tressa Jeong  Date: 2023         Service Type: Individual      Session Start Time: 3:32 PM  Session End Time: 4:17 PM     Session Length: 38-52 min    Session #: 1    Attendees: Client attended alone    Service Modality:  Video Visit:      Provider verified identity through the following two step process.  Patient provided:  Patient photo and Patient     Telemedicine Visit: The patient's condition can be safely assessed and treated via synchronous audio and visual telemedicine encounter.      Reason for Telemedicine Visit: Patient has requested telehealth visit    Originating Site (Patient Location): Patient's home    Distant Site (Provider Location): Provider Remote Setting- Home Office    Consent:  The patient/guardian has verbally consented to: the potential risks and benefits of telemedicine (video visit) versus in person care; bill my insurance or make self-payment for services provided; and responsibility for payment of non-covered services.     Patient would like the video invitation sent by:  My Chart    Mode of Communication:  Video Conference via [employer]    Distant Location (Provider):  Off-site    As the provider I attest to compliance with applicable laws and regulations related to telemedicine.    DATA  Interactive Complexity: No  Crisis: No        Progress Since Last Session (Related to Symptoms / Goals / Homework):   Symptoms: This was the patient's first session. Patient reports symptoms of feeling anxious, worrying about multiple things, difficulty relaxing, restlessness, irritability, sleep disturbance, feeling tired, and difficulty concentrating.     Homework: This was the patient's first session.       Episode of Care Goals: This was the patient's first session.      Current / Ongoing Stressors and Concerns:   Patient reports she is concerned about ADHD symptoms. Patient reports she is  concerned about motivation. Patient reports she is concerned about working.      Treatment Objective(s) Addressed in This Session:   This was the patient's first session.      Intervention:   Motivational Interviewing: client-centered interview focusing on assessing the stages of change.     Assessments completed prior to visit:  The following assessments were completed by patient for this visit:  PHQ9:       4/7/2016    11:17 AM 9/13/2022     8:51 AM 11/15/2022    11:10 AM 7/11/2023     3:49 PM   PHQ-9 SCORE   PHQ-9 Total Score MyChart  17 (Moderately severe depression)  4 (Minimal depression)   PHQ-9 Total Score 12 17 15 4     GAD7:       4/7/2016    11:17 AM 9/13/2022     3:40 PM 11/15/2022    11:10 AM 12/27/2022    11:02 AM 1/18/2023     3:49 PM 7/12/2023     3:00 PM   LYUBOV-7 SCORE   Total Score  18 (severe anxiety)  14 (moderate anxiety) 7 (mild anxiety)    Total Score 9 18 12 14 7 6     PROMIS 10-Global Health (only subscores and total score):       9/13/2022     3:45 PM 11/15/2022    11:18 AM 7/11/2023     4:12 PM   PROMIS-10 Scores Only   Global Mental Health Score  10 11   Global Physical Health Score  15 16   PROMIS TOTAL - SUBSCORES  25 27       Information is confidential and restricted. Go to Review Flowsheets to unlock data.         ASSESSMENT: Current Emotional / Mental Status (status of significant symptoms):   Risk status (Self / Other harm or suicidal ideation)   Patient denies current fears or concerns for personal safety.   Patient denies current or recent suicidal ideation or behaviors.   Patient denies current or recent homicidal ideation or behaviors.   Patient denies current or recent self injurious behavior or ideation.   Patient denies other safety concerns.   Patient reports there has been no change in risk factors since their last session.     Patient reports there has been no change in protective factors since their last session.     Recommended that patient call 911 or go to the local ED  should there be a change in any of these risk factors.     Appearance:   Appropriate    Eye Contact:   Good    Psychomotor Behavior: Normal    Attitude:   Cooperative    Orientation:   All   Speech    Rate / Production: Hyperverbal     Volume:  Normal    Mood:    Anxious    Affect:    Worrisome    Thought Content:  Rumination    Thought Form:  Logical    Insight:    Good      Medication Review:   No changes to current psychiatric medication(s)     Medication Compliance:   Yes     Changes in Health Issues:   None reported     Chemical Use Review:   Substance Use: Chemical use reviewed, no active concerns identified      Tobacco Use: No current tobacco use.      Diagnosis:  1. ADHD (attention deficit hyperactivity disorder), inattentive type    2. LYUBOV (generalized anxiety disorder)        Collateral Reports Completed:   Routed note to PCP    PLAN: (Patient Tasks / Therapist Tasks / Other)  Patient will think of attainable therapeutic goals related to overarching goals identified in session. We will discuss next session.         Aline Shabazz, [facility] 7/12/2023    ___________________________________________________________

## 2023-07-27 ENCOUNTER — TELEPHONE (OUTPATIENT)
Dept: PSYCHOLOGY | Facility: CLINIC | Age: 48
End: 2023-07-27
Payer: COMMERCIAL

## 2023-07-27 NOTE — TELEPHONE ENCOUNTER
Provider called patient to offer an open session on Friday, 7/28 at 11:30 AM. Provider asked patient to respond via [employer].

## 2023-07-28 ENCOUNTER — VIRTUAL VISIT (OUTPATIENT)
Dept: PSYCHOLOGY | Facility: CLINIC | Age: 48
End: 2023-07-28
Payer: COMMERCIAL

## 2023-07-28 DIAGNOSIS — F41.1 GAD (GENERALIZED ANXIETY DISORDER): ICD-10-CM

## 2023-07-28 DIAGNOSIS — F90.0 ADHD (ATTENTION DEFICIT HYPERACTIVITY DISORDER), INATTENTIVE TYPE: Primary | ICD-10-CM

## 2023-07-28 PROCEDURE — 90837 PSYTX W PT 60 MINUTES: CPT | Mod: VID

## 2023-07-28 NOTE — PROGRESS NOTES
[facility]                                     Progress Note    Patient Name: Tressa Jeong  Date: 7/28/2023         Service Type: Individual      Session Start Time: 11:25 AM  Session End Time: 12:22 PM     Session Length: 53+ min    Session #: 2    Attendees: Client attended alone    Service Modality:  Video Visit:      Provider verified identity through the following two step process.  Patient provided:  Patient is known previously to provider    Telemedicine Visit: The patient's condition can be safely assessed and treated via synchronous audio and visual telemedicine encounter.      Reason for Telemedicine Visit: Patient has requested telehealth visit    Originating Site (Patient Location): Patient's home    Distant Site (Provider Location): Provider Remote Setting- Home Office    Consent:  The patient/guardian has verbally consented to: the potential risks and benefits of telemedicine (video visit) versus in person care; bill my insurance or make self-payment for services provided; and responsibility for payment of non-covered services.     Patient would like the video invitation sent by:  My Chart    Mode of Communication:  [facility]    Distant Location (Provider):  Off-site    As the provider I attest to compliance with applicable laws and regulations related to telemedicine.    DATA  Extended Session (53+ minutes): PROLONGED SERVICE IN THE OUTPATIENT SETTING REQUIRING DIRECT (FACE-TO-FACE) PATIENT CONTACT BEYOND THE USUAL SERVICE:    - Patient's presenting concerns require more intensive intervention than could be completed within the usual service  Interactive Complexity: No  Crisis: No        Progress Since Last Session (Related to Symptoms / Goals / Homework):   Symptoms: No change based on patient self-report. Patient reports symptoms of feeling anxious, worrying about multiple things, difficulty relaxing, restlessness, irritability, sleep disturbance, feeling  tired, and difficulty concentrating.     Homework: Partially completed  Completed in session      Episode of Care Goals: Satisfactory progress - CONTEMPLATION (Considering change and yet undecided); Intervened by assessing the negative and positive thinking (ambivalence) about behavior change     Current / Ongoing Stressors and Concerns:   Patient reports she is concerned about ADHD symptoms. Patient reports she is concerned about motivation. Patient reports she is concerned about working.      Treatment Objective(s) Addressed in This Session:   Patient and provider collaborated to create a treatment plan this session.      Intervention:   Motivational Interviewing: client-centered interview focusing on assessing the stages of change.     Assessments completed prior to visit:  The following assessments were completed by patient for this visit:  PHQ9:       4/7/2016    11:17 AM 9/13/2022     8:51 AM 11/15/2022    11:10 AM 7/11/2023     3:49 PM   PHQ-9 SCORE   PHQ-9 Total Score MyChart  17 (Moderately severe depression)  4 (Minimal depression)   PHQ-9 Total Score 12 17 15 4     GAD7:       4/7/2016    11:17 AM 9/13/2022     3:40 PM 11/15/2022    11:10 AM 12/27/2022    11:02 AM 1/18/2023     3:49 PM 7/12/2023     3:00 PM   LYUBOV-7 SCORE   Total Score  18 (severe anxiety)  14 (moderate anxiety) 7 (mild anxiety)    Total Score 9 18 12 14 7 6     PROMIS 10-Global Health (only subscores and total score):       9/13/2022     3:45 PM 11/15/2022    11:18 AM 7/11/2023     4:12 PM   PROMIS-10 Scores Only   Global Mental Health Score  10 11   Global Physical Health Score  15 16   PROMIS TOTAL - SUBSCORES  25 27       Information is confidential and restricted. Go to Review Flowsheets to unlock data.         ASSESSMENT: Current Emotional / Mental Status (status of significant symptoms):   Risk status (Self / Other harm or suicidal ideation)   Patient denies current fears or concerns for personal safety.   Patient denies current or  recent suicidal ideation or behaviors.   Patient denies current or recent homicidal ideation or behaviors.   Patient denies current or recent self injurious behavior or ideation.   Patient denies other safety concerns.   Patient reports there has been no change in risk factors since their last session.     Patient reports there has been no change in protective factors since their last session.     Recommended that patient call 911 or go to the local ED should there be a change in any of these risk factors.     Appearance:   Appropriate    Eye Contact:   Good    Psychomotor Behavior: Normal    Attitude:   Cooperative    Orientation:   All   Speech    Rate / Production: Hyperverbal     Volume:  Normal    Mood:    Anxious    Affect:    Worrisome    Thought Content:  Rumination    Thought Form:  Logical    Insight:    Good      Medication Review:   No changes to current psychiatric medication(s)     Medication Compliance:   Yes     Changes in Health Issues:   None reported     Chemical Use Review:   Substance Use: Chemical use reviewed, no active concerns identified      Tobacco Use: No current tobacco use.      Diagnosis:  1. ADHD (attention deficit hyperactivity disorder), inattentive type    2. LYUBOV (generalized anxiety disorder)        Collateral Reports Completed:   Not Applicable    PLAN: (Patient Tasks / Therapist Tasks / Other)  Patient will create a list of coping skills that she is currently using. We will discuss next session.         Aline Shabazz, [facility] 7/28/2023    ___________________________________________________________                                              Individual Treatment Plan    Patient's Name: Tressa Jeong  YOB: 1975    Date of Creation: 7/28/2023  Date Treatment Plan Last Reviewed/Revised: 7/28/2023    DSM5 Diagnoses: Attention-Deficit/Hyperactivity Disorder  314.00 (F90.0) Predominantly inattentive presentation or 300.02 (F41.1) Generalized Anxiety  Disorder  Psychosocial / Contextual Factors: Patient is currently unemployed. Patient is living with her dad part time. Patient is single.   PROMIS (reviewed every 90 days):The following assessments were completed by patient for this visit:  PROMIS 10-Global Health (only subscores and total score):       9/13/2022     3:45 PM 11/15/2022    11:18 AM 7/11/2023     4:12 PM   PROMIS-10 Scores Only   Global Mental Health Score  10 11   Global Physical Health Score  15 16   PROMIS TOTAL - SUBSCORES  25 27       Information is confidential and restricted. Go to Review Flowsheets to unlock data.        Referral / Collaboration:  Referral to another professional/service is not indicated at this time.    Anticipated number of session for this episode of care:  50  Anticipation frequency of session: Every other week  Anticipated Duration of each session: 38-52 minutes  Treatment plan will be reviewed in 90 days or when goals have been changed.       MeasurableTreatment Goal(s) related to diagnosis / functional impairment(s)  Goal 1: Patient will work on stabilizing symptoms of ADHD.      Objective #A  Patient will learn about the diagnosis and symptoms of ADHD.   Status: New - Date: 7/28/2023    Intervention(s)  Therapist will teach about the diagnosis of ADHD.     Objective #B  Patient will practice setting goals and completing them.                         Status: New - Date: 7/28/2023     Intervention(s)  Therapist will teach SMART goals and how to break tasks down into smaller tasks.     Objective #C  Patient will learn 3 skills to help increase motivation and organization.   Status: New - Date: 7/28/2023    Intervention(s)  Therapist will assign homework of using coping skills to increase motivation and organizational skills.    Objective #D  Patient will learn about how ADHD can impact social interactions and interpersonal relationships.  Status:New -Date: 7/28/2023    Intervention(s)  Therapist will teach about how ADHD  can impact social interactions and interpersonal relationships.         Goal 2: Client will work on stabilizing anxiety symptoms as evidenced by LYUBOV-7 scores (current is 6) and Self report.  Goal is to reduce by five points.      I will know I've met my goal when I can better manage my anxiety .      Objective #A Client will identify triggers of anxiety and process them in session.    Status: New - Date: 7/28/2023    Intervention(s)  Therapist will teach emotional recognition/identification by assigning homework to journal with written exercises.    Objective #B  Client will identify initial signs or symptoms of anxiety.    Status: New - Date: 7/28/2023    Intervention(s)  Therapist will teach emotional regulation skills, such as deep breathing and mindfulness skills.    Objective #C  Client will learn about co-morbidities between ADHD and LYUBOV.   Status: New - Date: 7/28/2023    Intervention(s)  Therapist will provide educational materials on symptoms of ADHD and LYUBOV.          Patient has reviewed and agreed to the above plan.      Aline Shabazz, [facility]  July 28, 2023

## 2023-08-04 ENCOUNTER — VIRTUAL VISIT (OUTPATIENT)
Dept: PSYCHOLOGY | Facility: CLINIC | Age: 48
End: 2023-08-04
Payer: COMMERCIAL

## 2023-08-04 DIAGNOSIS — F41.1 GAD (GENERALIZED ANXIETY DISORDER): ICD-10-CM

## 2023-08-04 DIAGNOSIS — F90.0 ADHD (ATTENTION DEFICIT HYPERACTIVITY DISORDER), INATTENTIVE TYPE: Primary | ICD-10-CM

## 2023-08-04 PROCEDURE — 90837 PSYTX W PT 60 MINUTES: CPT | Mod: VID

## 2023-08-04 NOTE — PROGRESS NOTES
[facility]                                     Progress Note    Patient Name: Tressa Jeong  Date: 8/4/2023         Service Type: Individual      Session Start Time: 11:27 AM  Session End Time: 12:23 PM     Session Length: 53+ min    Session #: 3    Attendees: Client attended alone    Service Modality:  Video Visit:      Provider verified identity through the following two step process.  Patient provided:  Patient is known previously to provider    Telemedicine Visit: The patient's condition can be safely assessed and treated via synchronous audio and visual telemedicine encounter.      Reason for Telemedicine Visit: Patient has requested telehealth visit    Originating Site (Patient Location): Patient's home    Distant Site (Provider Location): Provider Remote Setting- Home Office    Consent:  The patient/guardian has verbally consented to: the potential risks and benefits of telemedicine (video visit) versus in person care; bill my insurance or make self-payment for services provided; and responsibility for payment of non-covered services.     Patient would like the video invitation sent by:  My Chart    Mode of Communication:  Video Conference via [facility]    Distant Location (Provider):  Off-site    As the provider I attest to compliance with applicable laws and regulations related to telemedicine.    DATA  Extended Session (53+ minutes): PROLONGED SERVICE IN THE OUTPATIENT SETTING REQUIRING DIRECT (FACE-TO-FACE) PATIENT CONTACT BEYOND THE USUAL SERVICE:    - Patient's presenting concerns require more intensive intervention than could be completed within the usual service  Interactive Complexity: No  Crisis: No        Progress Since Last Session (Related to Symptoms / Goals / Homework):   Symptoms: No change based on patient self-report.    Homework: Achieved / completed to satisfaction      Episode of Care Goals: Satisfactory progress - CONTEMPLATION (Considering change and yet undecided);  Intervened by assessing the negative and positive thinking (ambivalence) about behavior change     Current / Ongoing Stressors and Concerns:   Patient reports she is concerned about ADHD symptoms. Patient reports she is concerned about motivation. Patient reports she is concerned about working.      Treatment Objective(s) Addressed in This Session:   Patient will learn about the diagnosis and symptoms of ADHD.   Patient will practice setting goals and completing them.     Intervention:   Motivational Interviewing: supported patient in processing and exploring her current coping skills for ADHD symptoms.   Validated patient's use of skills. Supported patient in exploring empathy. Discussed increased empathy as a symptom of ADHD. Discussed memory. Supported patient in exploring how she amy with forgetfulness. Discussed keeping her to-do list in one place to avoid spending time searching for it.     Assessments completed prior to visit:  The following assessments were completed by patient for this visit:  PHQ9:       4/7/2016    11:17 AM 9/13/2022     8:51 AM 11/15/2022    11:10 AM 7/11/2023     3:49 PM   PHQ-9 SCORE   PHQ-9 Total Score MyChart  17 (Moderately severe depression)  4 (Minimal depression)   PHQ-9 Total Score 12 17 15 4     GAD7:       4/7/2016    11:17 AM 9/13/2022     3:40 PM 11/15/2022    11:10 AM 12/27/2022    11:02 AM 1/18/2023     3:49 PM 7/12/2023     3:00 PM   LYUBOV-7 SCORE   Total Score  18 (severe anxiety)  14 (moderate anxiety) 7 (mild anxiety)    Total Score 9 18 12 14 7 6     PROMIS 10-Global Health (only subscores and total score):       9/13/2022     3:45 PM 11/15/2022    11:18 AM 7/11/2023     4:12 PM   PROMIS-10 Scores Only   Global Mental Health Score  10 11   Global Physical Health Score  15 16   PROMIS TOTAL - SUBSCORES  25 27       Information is confidential and restricted. Go to Review Flowsheets to unlock data.         ASSESSMENT: Current Emotional / Mental Status (status of  significant symptoms):   Risk status (Self / Other harm or suicidal ideation)   Patient denies current fears or concerns for personal safety.   Patient denies current or recent suicidal ideation or behaviors.   Patient denies current or recent homicidal ideation or behaviors.   Patient denies current or recent self injurious behavior or ideation.   Patient denies other safety concerns.   Patient reports there has been no change in risk factors since their last session.     Patient reports there has been no change in protective factors since their last session.     Recommended that patient call 911 or go to the local ED should there be a change in any of these risk factors.     Appearance:   Appropriate    Eye Contact:   Good    Psychomotor Behavior: Normal    Attitude:   Cooperative    Orientation:   All   Speech    Rate / Production: Hyperverbal     Volume:  Normal    Mood:    Anxious    Affect:    Worrisome    Thought Content:  Rumination    Thought Form:  Logical    Insight:    Good      Medication Review:   No changes to current psychiatric medication(s)     Medication Compliance:   Yes     Changes in Health Issues:   None reported     Chemical Use Review:   Substance Use: Chemical use reviewed, no active concerns identified      Tobacco Use: No current tobacco use.      Diagnosis:  1. ADHD (attention deficit hyperactivity disorder), inattentive type    2. LYUBOV (generalized anxiety disorder)        Collateral Reports Completed:   Not Applicable    PLAN: (Patient Tasks / Therapist Tasks / Other)  Patient will practice keeping her to-do list in one place to reduce the amount of time spent looking for it. Patient will be mindful of how effective this skill is. We will discuss next session.         Aline Shabazz, [facility] 8/4/2023    ___________________________________________________________                                              Individual Treatment Plan    Patient's Name: Tressa CECIL Jeong  Date Of  Birth: 1975    Date of Creation: 7/28/2023  Date Treatment Plan Last Reviewed/Revised: 7/28/2023    DSM5 Diagnoses: Attention-Deficit/Hyperactivity Disorder  314.00 (F90.0) Predominantly inattentive presentation or 300.02 (F41.1) Generalized Anxiety Disorder  Psychosocial / Contextual Factors: Patient is currently unemployed. Patient is living with her dad part time. Patient is single.   PROMIS (reviewed every 90 days):The following assessments were completed by patient for this visit:  PROMIS 10-Global Health (only subscores and total score):       9/13/2022     3:45 PM 11/15/2022    11:18 AM 7/11/2023     4:12 PM   PROMIS-10 Scores Only   Global Mental Health Score  10 11   Global Physical Health Score  15 16   PROMIS TOTAL - SUBSCORES  25 27       Information is confidential and restricted. Go to Review Flowsheets to unlock data.        Referral / Collaboration:  Referral to another professional/service is not indicated at this time.    Anticipated number of session for this episode of care:  50  Anticipation frequency of session: Every other week  Anticipated Duration of each session: 38-52 minutes  Treatment plan will be reviewed in 90 days or when goals have been changed.       MeasurableTreatment Goal(s) related to diagnosis / functional impairment(s)  Goal 1: Patient will work on stabilizing symptoms of ADHD.      Objective #A  Patient will learn about the diagnosis and symptoms of ADHD.   Status: New - Date: 7/28/2023    Intervention(s)  Therapist will teach about the diagnosis of ADHD.     Objective #B  Patient will practice setting goals and completing them.                         Status: New - Date: 7/28/2023     Intervention(s)  Therapist will teach SMART goals and how to break tasks down into smaller tasks.     Objective #C  Patient will learn 3 skills to help increase motivation and organization.   Status: New - Date: 7/28/2023    Intervention(s)  Therapist will assign homework of using coping  skills to increase motivation and organizational skills.    Objective #D  Patient will learn about how ADHD can impact social interactions and interpersonal relationships.  Status:New -Date: 7/28/2023    Intervention(s)  Therapist will teach about how ADHD can impact social interactions and interpersonal relationships.         Goal 2: Client will work on stabilizing anxiety symptoms as evidenced by LYUBOV-7 scores (current is 6) and Self report.  Goal is to reduce by five points.      I will know I've met my goal when I can better manage my anxiety .      Objective #A Client will identify triggers of anxiety and process them in session.    Status: New - Date: 7/28/2023    Intervention(s)  Therapist will teach emotional recognition/identification by assigning homework to journal with written exercises.    Objective #B  Client will identify initial signs or symptoms of anxiety.    Status: New - Date: 7/28/2023    Intervention(s)  Therapist will teach emotional regulation skills, such as deep breathing and mindfulness skills.    Objective #C  Client will learn about co-morbidities between ADHD and LYUBOV.   Status: New - Date: 7/28/2023    Intervention(s)  Therapist will provide educational materials on symptoms of ADHD and LYUBOV.          Patient has reviewed and agreed to the above plan.      Aline Shabazz, [facility]  July 28, 2023

## 2023-08-30 ENCOUNTER — VIRTUAL VISIT (OUTPATIENT)
Dept: PSYCHOLOGY | Facility: CLINIC | Age: 48
End: 2023-08-30
Payer: COMMERCIAL

## 2023-08-30 DIAGNOSIS — F41.1 GAD (GENERALIZED ANXIETY DISORDER): ICD-10-CM

## 2023-08-30 DIAGNOSIS — F90.0 ADHD (ATTENTION DEFICIT HYPERACTIVITY DISORDER), INATTENTIVE TYPE: Primary | ICD-10-CM

## 2023-08-30 PROCEDURE — 90837 PSYTX W PT 60 MINUTES: CPT | Mod: VID

## 2023-08-30 ASSESSMENT — ANXIETY QUESTIONNAIRES
1. FEELING NERVOUS, ANXIOUS, OR ON EDGE: SEVERAL DAYS
3. WORRYING TOO MUCH ABOUT DIFFERENT THINGS: NOT AT ALL
5. BEING SO RESTLESS THAT IT IS HARD TO SIT STILL: NOT AT ALL
IF YOU CHECKED OFF ANY PROBLEMS ON THIS QUESTIONNAIRE, HOW DIFFICULT HAVE THESE PROBLEMS MADE IT FOR YOU TO DO YOUR WORK, TAKE CARE OF THINGS AT HOME, OR GET ALONG WITH OTHER PEOPLE: SOMEWHAT DIFFICULT
2. NOT BEING ABLE TO STOP OR CONTROL WORRYING: NOT AT ALL
6. BECOMING EASILY ANNOYED OR IRRITABLE: SEVERAL DAYS
GAD7 TOTAL SCORE: 5
4. TROUBLE RELAXING: NEARLY EVERY DAY
GAD7 TOTAL SCORE: 5
7. FEELING AFRAID AS IF SOMETHING AWFUL MIGHT HAPPEN: NOT AT ALL

## 2023-08-30 NOTE — PROGRESS NOTES
[facility]                                     Progress Note    Patient Name: Tressa Jeong  Date: 8/30/2023         Service Type: Individual      Session Start Time: 1:26 PM  Session End Time: 2:23 PM     Session Length: 53+ min    Session #: 4    Attendees: Client attended alone    Service Modality:  Video Visit:      Provider verified identity through the following two step process.  Patient provided:  Patient is known previously to provider    Telemedicine Visit: The patient's condition can be safely assessed and treated via synchronous audio and visual telemedicine encounter.      Reason for Telemedicine Visit: Patient has requested telehealth visit    Originating Site (Patient Location): Patient's home    Distant Site (Provider Location): Provider Remote Setting- Home Office    Consent:  The patient/guardian has verbally consented to: the potential risks and benefits of telemedicine (video visit) versus in person care; bill my insurance or make self-payment for services provided; and responsibility for payment of non-covered services.     Patient would like the video invitation sent by:  My Chart    Mode of Communication:  Video Conference via [employer]    Distant Location (Provider):  Off-site    As the provider I attest to compliance with applicable laws and regulations related to telemedicine.    DATA  Extended Session (53+ minutes): PROLONGED SERVICE IN THE OUTPATIENT SETTING REQUIRING DIRECT (FACE-TO-FACE) PATIENT CONTACT BEYOND THE USUAL SERVICE:    - Patient's presenting concerns require more intensive intervention than could be completed within the usual service  Interactive Complexity: No  Crisis: No        Progress Since Last Session (Related to Symptoms / Goals / Homework):   Symptoms: No change based on LYUBOV-7 scores and patient self-report.     Homework: Achieved / completed to satisfaction      Episode of Care Goals: Satisfactory progress - CONTEMPLATION (Considering change  and yet undecided); Intervened by assessing the negative and positive thinking (ambivalence) about behavior change     Current / Ongoing Stressors and Concerns:   Patient reports she is concerned about ADHD symptoms. Patient reports she is concerned about motivation. Patient reports she is concerned about working.      Treatment Objective(s) Addressed in This Session:   Patient will learn about the diagnosis and symptoms of ADHD.   Patient will practice setting goals and completing them.  Patient will learn 3 skills to help increase motivation and organization.   Patient will learn about how ADHD can impact social interactions and interpersonal relationships.     Intervention:   Motivational Interviewing: supported patient in processing and exploring how ADHD impacts her social relationships.   Validated patient's emotions. Discussed increased empathy with ADHD. Discussed missing social cues. Supported patient in exploring how she has navigated relationships as an adult.   Supported patient in exploring how keeping her to-do list in one place has worked. Validated patient's use of skills. Discussed pairing things she is unmotivated to do with things that she is motivated to do. Supported patient in identifying what she can pair with these tasks to make them more approachable.   Discussed attendance with patient. Patient reports she understands and agrees.      Assessments completed prior to visit:  The following assessments were completed by patient for this visit:  PHQ9:       4/7/2016    11:17 AM 9/13/2022     8:51 AM 11/15/2022    11:10 AM 7/11/2023     3:49 PM   PHQ-9 SCORE   PHQ-9 Total Score MyChart  17 (Moderately severe depression)  4 (Minimal depression)   PHQ-9 Total Score 12 17 15 4     GAD7:       4/7/2016    11:17 AM 9/13/2022     3:40 PM 11/15/2022    11:10 AM 12/27/2022    11:02 AM 1/18/2023     3:49 PM 7/12/2023     3:00 PM 8/30/2023     1:00 PM   LYUBOV-7 SCORE   Total Score  18 (severe anxiety)  14  (moderate anxiety) 7 (mild anxiety)     Total Score 9 18 12 14 7 6 5     PROMIS 10-Global Health (only subscores and total score):       9/13/2022     3:45 PM 11/15/2022    11:18 AM 7/11/2023     4:12 PM   PROMIS-10 Scores Only   Global Mental Health Score  10 11   Global Physical Health Score  15 16   PROMIS TOTAL - SUBSCORES  25 27       Information is confidential and restricted. Go to Review Flowsheets to unlock data.         ASSESSMENT: Current Emotional / Mental Status (status of significant symptoms):   Risk status (Self / Other harm or suicidal ideation)   Patient denies current fears or concerns for personal safety.   Patient denies current or recent suicidal ideation or behaviors.   Patient denies current or recent homicidal ideation or behaviors.   Patient denies current or recent self injurious behavior or ideation.   Patient denies other safety concerns.   Patient reports there has been no change in risk factors since their last session.     Patient reports there has been no change in protective factors since their last session.     Recommended that patient call 911 or go to the local ED should there be a change in any of these risk factors.     Appearance:   Appropriate    Eye Contact:   Good    Psychomotor Behavior: Normal    Attitude:   Cooperative    Orientation:   All   Speech    Rate / Production: Hyperverbal     Volume:  Normal    Mood:    Anxious    Affect:    Worrisome    Thought Content:  Rumination    Thought Form:  Logical    Insight:    Good      Medication Review:   No changes to current psychiatric medication(s)     Medication Compliance:   Yes     Changes in Health Issues:   None reported     Chemical Use Review:   Substance Use: Chemical use reviewed, no active concerns identified      Tobacco Use: No current tobacco use.      Diagnosis:  1. ADHD (attention deficit hyperactivity disorder), inattentive type    2. LYUBOV (generalized anxiety disorder)        Collateral Reports  Completed:   Not Applicable    PLAN: (Patient Tasks / Therapist Tasks / Other)  Patient will practice pairing things she is unmotivated to do with things she is motivated to do. Patient will be mindful of how effective this skill is.  We will discuss next session.         Aline Shabazz, [facility] 8/30/2023    ___________________________________________________________                                              Individual Treatment Plan    Patient's Name: Tressa Jeong  YOB: 1975    Date of Creation: 7/28/2023  Date Treatment Plan Last Reviewed/Revised: 7/28/2023    DSM5 Diagnoses: Attention-Deficit/Hyperactivity Disorder  314.00 (F90.0) Predominantly inattentive presentation or 300.02 (F41.1) Generalized Anxiety Disorder  Psychosocial / Contextual Factors: Patient is currently unemployed. Patient is living with her dad part time. Patient is single.   PROMIS (reviewed every 90 days):The following assessments were completed by patient for this visit:  PROMIS 10-Global Health (only subscores and total score):       9/13/2022     3:45 PM 11/15/2022    11:18 AM 7/11/2023     4:12 PM   PROMIS-10 Scores Only   Global Mental Health Score  10 11   Global Physical Health Score  15 16   PROMIS TOTAL - SUBSCORES  25 27       Information is confidential and restricted. Go to Review Flowsheets to unlock data.        Referral / Collaboration:  Referral to another professional/service is not indicated at this time.    Anticipated number of session for this episode of care:  50  Anticipation frequency of session: Every other week  Anticipated Duration of each session: 38-52 minutes  Treatment plan will be reviewed in 90 days or when goals have been changed.       MeasurableTreatment Goal(s) related to diagnosis / functional impairment(s)  Goal 1: Patient will work on stabilizing symptoms of ADHD.      Objective #A  Patient will learn about the diagnosis and symptoms of ADHD.   Status: New - Date:  7/28/2023    Intervention(s)  Therapist will teach about the diagnosis of ADHD.     Objective #B  Patient will practice setting goals and completing them.                         Status: New - Date: 7/28/2023     Intervention(s)  Therapist will teach SMART goals and how to break tasks down into smaller tasks.     Objective #C  Patient will learn 3 skills to help increase motivation and organization.   Status: New - Date: 7/28/2023    Intervention(s)  Therapist will assign homework of using coping skills to increase motivation and organizational skills.    Objective #D  Patient will learn about how ADHD can impact social interactions and interpersonal relationships.  Status:New -Date: 7/28/2023    Intervention(s)  Therapist will teach about how ADHD can impact social interactions and interpersonal relationships.         Goal 2: Client will work on stabilizing anxiety symptoms as evidenced by LYUBOV-7 scores (current is 6) and Self report.  Goal is to reduce by five points.      I will know I've met my goal when I can better manage my anxiety .      Objective #A Client will identify triggers of anxiety and process them in session.    Status: New - Date: 7/28/2023    Intervention(s)  Therapist will teach emotional recognition/identification by assigning homework to journal with written exercises.    Objective #B  Client will identify initial signs or symptoms of anxiety.    Status: New - Date: 7/28/2023    Intervention(s)  Therapist will teach emotional regulation skills, such as deep breathing and mindfulness skills.    Objective #C  Client will learn about co-morbidities between ADHD and LYUBOV.   Status: New - Date: 7/28/2023    Intervention(s)  Therapist will provide educational materials on symptoms of ADHD and LYUBOV.          Patient has reviewed and agreed to the above plan.      Aline Shabazz, [facility]  July 28, 2023

## 2023-08-31 ENCOUNTER — TELEPHONE (OUTPATIENT)
Dept: FAMILY MEDICINE | Facility: CLINIC | Age: 48
End: 2023-08-31
Payer: COMMERCIAL

## 2023-08-31 ENCOUNTER — TELEPHONE (OUTPATIENT)
Dept: GASTROENTEROLOGY | Facility: CLINIC | Age: 48
End: 2023-08-31
Payer: COMMERCIAL

## 2023-08-31 DIAGNOSIS — G47.19 EXCESSIVE DAYTIME SLEEPINESS: Primary | ICD-10-CM

## 2023-08-31 DIAGNOSIS — R06.83 SNORING: ICD-10-CM

## 2023-08-31 NOTE — TELEPHONE ENCOUNTER
"Endoscopy Scheduling Screen    Have you had a positive Covid test in the last 14 days?  No    Are you active on MyChart?   Yes    What insurance is in the chart?  Other:  [facility]    Ordering/Referring Provider:     Yaakov Rios MD      (If ordering provider performs procedure, schedule with ordering provider unless otherwise instructed. )    BMI: Estimated body mass index is 33.61 kg/m  as calculated from the following:    Height as of 6/7/23: 1.651 m (5' 5\").    Weight as of 6/7/23: 91.6 kg (202 lb).     Sedation Ordered  moderate sedation.   If patient BMI > 50 do not schedule in [facility].    If patient BMI > 45 do not schedule at [facility].    Are you taking methadone or Suboxone?  No    Are you taking any prescription medications for pain 3 or more times per week?   No    Do you have a history of malignant hyperthermia or adverse reaction to anesthesia?  No    (Females) Are you currently pregnant?   No     Have you been diagnosed or told you have pulmonary hypertension?   No    Do you have an LVAD?  No    Have you been told you have moderate to severe sleep apnea?  No    Have you been told you have COPD, asthma, or any other lung disease?  No    Do you have any heart conditions?  No     Have you ever had an organ transplant?   No    Have you ever had or are you awaiting a heart or lung transplant?   No    Have you had a stroke or transient ischemic attack (TIA aka \"mini stroke\" in the last 6 months?   No    Have you been diagnosed with or been told you have cirrhosis of the liver?   No    Are you currently on dialysis?   No    Do you need assistance transferring?   No    BMI: Estimated body mass index is 33.61 kg/m  as calculated from the following:    Height as of 6/7/23: 1.651 m (5' 5\").    Weight as of 6/7/23: 91.6 kg (202 lb).     Is patients BMI > 40 and scheduling location [facility]?  No    Do you take an injectable medication for weight loss or diabetes (excluding insulin)?  No    Do you take the medication " Naltrexone?  No    Do you take blood thinners?  No       Prep   Are you currently on dialysis or do you have chronic kidney disease?  No    Do you have a diagnosis of diabetes?  No    Do you have a diagnosis of cystic fibrosis (CF)?  No    On a regular basis do you go 3 -5 days between bowel movements?  No    BMI > 40?  No    Preferred Pharmacy:    [id number] [place], [place] - [address]   [address] [facility] [address]  Phone: [phone] Fax: [phone]      Final Scheduling Details   Colonoscopy prep sent?  Standard MiraLAX    Procedure scheduled  Colonoscopy    Surgeon:  ELY     Date of procedure:  9/8/23     Pre-OP / PAC:   No - Not required for this site.    Location  [facility] - [facility] - Per order.    Sedation   Moderate Sedation - Per order.      Patient Reminders:   You will receive a call from a Nurse to review instructions and health history.  This assessment must be completed prior to your procedure.  Failure to complete the Nurse assessment may result in the procedure being cancelled.      On the day of your procedure, please designate an adult(s) who can drive you home stay with you for the next 24 hours. The medicines used in the exam will make you sleepy. You will not be able to drive.      You cannot take public transportation, ride share services, or non-medical taxi service without a responsible caregiver.  Medical transport services are allowed with the requirement that a responsible caregiver will receive you at your destination.  We require that drivers and caregivers are confirmed prior to your procedure.

## 2023-08-31 NOTE — TELEPHONE ENCOUNTER
Order/Referral Request    Who is requesting: Pt     Orders being requested: sleep consult, current referral is     Reason service is needed/diagnosis: sleep    When are orders needed by: as soon as possible    Has this been discussed with Provider: Yes    Does patient have a preference on a Group/Provider/Facility? [facility]    Does patient have an appointment scheduled?: No    Where to send orders: Place orders within Epic    Could we send this information to you in [facility] or would you prefer to receive a phone call?:   Patient would prefer a phone call   Okay to leave a detailed message?: No at Cell number on file:    Telephone Information:   Mobile [phone]

## 2023-09-01 ENCOUNTER — TELEPHONE (OUTPATIENT)
Dept: GASTROENTEROLOGY | Facility: CLINIC | Age: 48
End: 2023-09-01
Payer: COMMERCIAL

## 2023-09-01 DIAGNOSIS — Z12.11 ENCOUNTER FOR SCREENING COLONOSCOPY: Primary | ICD-10-CM

## 2023-09-01 RX ORDER — POLYETHYLENE GLYCOL 3350 17 G/17G
POWDER, FOR SOLUTION ORAL
Qty: 238 G | Refills: 0 | Status: SHIPPED | OUTPATIENT
Start: 2023-09-01 | End: 2023-09-22

## 2023-09-01 RX ORDER — BISACODYL 5 MG/1
TABLET, DELAYED RELEASE ORAL
Qty: 4 TABLET | Refills: 0 | Status: SHIPPED | OUTPATIENT
Start: 2023-09-01 | End: 2023-09-22

## 2023-09-01 NOTE — TELEPHONE ENCOUNTER
Pre visit planning completed.      Procedure details:    Patient scheduled for Colonoscopy  on 9/8/23.     Arrival time: 0745. Procedure time 0845    Pre op exam needed? N/A    Facility location: [facility]; [address] [place] [address], [address] Floor, [place] [address]    Sedation type: Conscious sedation     Indication for procedure: screening      Chart review:     Electronic implanted devices? No    Diabetic? No      Medication review:    Anticoagulants? No    NSAIDS? No NSAID medications per patient's medication list.  RN will verify with pre-assessment call.    Other medication HOLDING recommendations:  N/A      Prep for procedure:     Bowel prep recommendation: Miralax prep without magnesium citrate   Due to:  standard bowel prep.    Prep instructions sent via honorio Fernandez RN  Endoscopy Procedure Pre Assessment RN  [phone] option 4

## 2023-09-01 NOTE — TELEPHONE ENCOUNTER
Pre assessment completed for upcoming procedure.      Procedure details:    Patient scheduled for Colonoscopy  on 9/8/23.     Arrival time: 0745. Procedure time 0845    Pre op exam needed? N/A    [facility] [address] [place] [address], [address] Floor, [place] [address]    Sedation type: Conscious sedation     Indication for procedure: screening     COVID policy reviewed.    Designated  policy reviewed. Instructed to have someone stay 6 hours post procedure.       Chart review:     Electronic implanted devices? No    Diabetic? No    Medication review:    Anticoagulants? No    NSAIDS? No    Other medication HOLDING recommendations:  N/A      Prep for procedure:     Bowel prep recommendation: Miralax without magnesium citrate  Due to: standard bowel prep.    Prep instructions sent via [employer] Bowel prep script sent to [id number] - [place], [place] - [address] - patient requested to have rx sent to pharmacy. Is aware she will need to purchase her own gatorade.    Reviewed procedure prep instructions.     Patient verbalized understanding and had no questions or concerns at this time.        Carlyn Fernandez RN  Endoscopy Procedure Pre Assessment RN  [phone] option 4

## 2023-09-08 ENCOUNTER — ANESTHESIA EVENT (OUTPATIENT)
Dept: SURGERY | Facility: AMBULATORY SURGERY CENTER | Age: 48
End: 2023-09-08
Payer: COMMERCIAL

## 2023-09-08 ENCOUNTER — HOSPITAL ENCOUNTER (OUTPATIENT)
Facility: AMBULATORY SURGERY CENTER | Age: 48
Discharge: HOME | End: 2023-09-08
Attending: INTERNAL MEDICINE
Payer: COMMERCIAL

## 2023-09-08 ENCOUNTER — ANESTHESIA (OUTPATIENT)
Dept: SURGERY | Facility: AMBULATORY SURGERY CENTER | Age: 48
End: 2023-09-08
Payer: COMMERCIAL

## 2023-09-08 VITALS
DIASTOLIC BLOOD PRESSURE: 69 MMHG | SYSTOLIC BLOOD PRESSURE: 112 MMHG | HEART RATE: 80 BPM | WEIGHT: 197 LBS | TEMPERATURE: 97.1 F | OXYGEN SATURATION: 99 % | BODY MASS INDEX: 32.82 KG/M2 | RESPIRATION RATE: 16 BRPM | HEIGHT: 65 IN

## 2023-09-08 VITALS — HEART RATE: 77 BPM

## 2023-09-08 LAB
COLONOSCOPY: NORMAL
HCG UR QL: NEGATIVE
INTERNAL QC OK POCT: NORMAL
POCT KIT EXPIRATION DATE: NORMAL
POCT KIT LOT NUMBER: NORMAL

## 2023-09-08 PROCEDURE — 45385 COLONOSCOPY W/LESION REMOVAL: CPT | Mod: 33 | Performed by: INTERNAL MEDICINE

## 2023-09-08 PROCEDURE — 88305 TISSUE EXAM BY PATHOLOGIST: CPT | Mod: TC | Performed by: INTERNAL MEDICINE

## 2023-09-08 PROCEDURE — 81025 URINE PREGNANCY TEST: CPT | Performed by: PATHOLOGY

## 2023-09-08 RX ORDER — PROPOFOL 10 MG/ML
INJECTION, EMULSION INTRAVENOUS PRN
Status: DISCONTINUED | OUTPATIENT
Start: 2023-09-08 | End: 2023-09-08

## 2023-09-08 RX ORDER — ONDANSETRON 2 MG/ML
4 INJECTION INTRAMUSCULAR; INTRAVENOUS
Status: DISCONTINUED | OUTPATIENT
Start: 2023-09-08 | End: 2023-09-09 | Stop reason: HOSPADM

## 2023-09-08 RX ORDER — LIDOCAINE HYDROCHLORIDE 20 MG/ML
INJECTION, SOLUTION INFILTRATION; PERINEURAL PRN
Status: DISCONTINUED | OUTPATIENT
Start: 2023-09-08 | End: 2023-09-08

## 2023-09-08 RX ORDER — SODIUM CHLORIDE, SODIUM LACTATE, POTASSIUM CHLORIDE, CALCIUM CHLORIDE 600; 310; 30; 20 MG/100ML; MG/100ML; MG/100ML; MG/100ML
INJECTION, SOLUTION INTRAVENOUS CONTINUOUS
Status: DISCONTINUED | OUTPATIENT
Start: 2023-09-08 | End: 2023-09-09 | Stop reason: HOSPADM

## 2023-09-08 RX ORDER — LIDOCAINE 40 MG/G
CREAM TOPICAL
Status: DISCONTINUED | OUTPATIENT
Start: 2023-09-08 | End: 2023-09-09 | Stop reason: HOSPADM

## 2023-09-08 RX ORDER — PROPOFOL 10 MG/ML
INJECTION, EMULSION INTRAVENOUS CONTINUOUS PRN
Status: DISCONTINUED | OUTPATIENT
Start: 2023-09-08 | End: 2023-09-08

## 2023-09-08 RX ADMIN — LIDOCAINE HYDROCHLORIDE 60 MG: 20 INJECTION, SOLUTION INFILTRATION; PERINEURAL at 08:34

## 2023-09-08 RX ADMIN — SODIUM CHLORIDE, SODIUM LACTATE, POTASSIUM CHLORIDE, CALCIUM CHLORIDE: 600; 310; 30; 20 INJECTION, SOLUTION INTRAVENOUS at 08:31

## 2023-09-08 RX ADMIN — PROPOFOL 150 MCG/KG/MIN: 10 INJECTION, EMULSION INTRAVENOUS at 08:34

## 2023-09-08 RX ADMIN — PROPOFOL 60 MG: 10 INJECTION, EMULSION INTRAVENOUS at 08:34

## 2023-09-08 NOTE — ANESTHESIA POSTPROCEDURE EVALUATION
Patient: Tressa Jeong    Procedure: Procedure(s):  COLONOSCOPY, WITH POLYPECTOMY       Anesthesia Type:  MAC    Note:  Disposition: Outpatient   Postop Pain Control: Uneventful            Sign Out: Well controlled pain   PONV: No   Neuro/Psych: Uneventful            Sign Out: Acceptable/Baseline neuro status   Airway/Respiratory: Uneventful            Sign Out: Acceptable/Baseline resp. status   CV/Hemodynamics: Uneventful            Sign Out: Acceptable CV status; No obvious hypovolemia; No obvious fluid overload   Other NRE: NONE   DID A NON-ROUTINE EVENT OCCUR? No           Last vitals:  Vitals Value Taken Time   /66 09/08/23 0900   Temp 36.1  C (97  F) 09/08/23 0900   Pulse 88 09/08/23 0900   Resp 16 09/08/23 0900   SpO2 97 % 09/08/23 0900       Electronically Signed By: Gordy Day MD, MD  September 8, 2023  9:20 AM

## 2023-09-08 NOTE — INTERVAL H&P NOTE
"I have reviewed the surgical (or preoperative) H&P that is linked to this encounter, and examined the patient. There are no significant changes    Clinical Conditions Present on Arrival:  Clinically Significant Risk Factors Present on Admission                  # Obesity: Estimated body mass index is 32.78 kg/m  as calculated from the following:    Height as of this encounter: 1.651 m (5' 5\").    Weight as of this encounter: 89.4 kg (197 lb).       "

## 2023-09-08 NOTE — H&P
Tressa Jeong  [id number]  female  48 year old      Reason for procedure/surgery:   screening colonoscopy      Patient Active Problem List   Diagnosis    NO ACTIVE PROBLEMS    Hx of abnormal cervical Pap smear       Past Surgical History:    Past Surgical History:   Procedure Laterality Date    ORTHOPEDIC SURGERY         Past Medical History:   Past Medical History:   Diagnosis Date    Depressive disorder        Social History:   Social History     Tobacco Use    Smoking status: Former     Packs/day: 0.50     Years: 10.00     Pack years: 5.00     Types: Cigarettes    Smokeless tobacco: Never   Substance Use Topics    Alcohol use: No       Family History:   Family History   Problem Relation Age of Onset    Skin Cancer Mother     Bipolar Disorder Mother     Hypertension Mother     Psychotic Disorder Mother     Suicide Maternal Uncle     Psychotic Disorder Maternal Uncle         x4    Suicide Maternal Uncle     Psychotic Disorder Paternal Uncle         x1    Glaucoma No family hx of        Allergies:   Allergies   Allergen Reactions    Latex     Morphine      Not life treated, burning and painful when administ       Active Medications:   Current Outpatient Medications   Medication Sig Dispense Refill    betamethasone valerate (VALISONE) 0.1 % external ointment Apply topically 2 times daily Apply twice daily as needed for bug bites. 45 g 2    bisacodyl (DULCOLAX) 5 MG EC tablet Take as directed. One day prior to procedure at 12 pm, take 2 Dulcolax (Bisacodyl) tablets. At 10 PM, take 2 Dulcolax (Bisacodyl) tablets. 4 tablet 0    buPROPion (WELLBUTRIN XL) 150 MG 24 hr tablet Take 1 tablet every day by oral route.*      CONCERTA 18 MG CR tablet Take 18 mg by mouth every morning Pt has not started taking      polyethylene glycol (MIRALAX) 17 GM/Dose powder STEP ONE - Take as directed. One day before procedure at 5 pm, mix ONE entire bottle of Miralax with 64 ounces of Gatorade in a pitcher and stir to dissolve the powder.  "Chill if desired, but do not add ice. Begin drinking one 8-ounce glass of the Miralax/Gatorade mixture every 15 minutes until the pitcher is empty. Then proceed to step two with second bottle. STEP TWO - Day of procedure 4  hours prior to arrival time, mix 4 capfuls of Miralax with 16 oz. of Gatorade and begin drinking one 8-ounce glass of the Miralax/Gatorade mixture every 15 minutes until gone. 238 g 0    polyethylene glycol (MIRALAX) 17 GM/Dose powder STEP ONE - Take as directed. One day before procedure at 5 pm, mix ONE entire bottle of Miralax with 64 ounces of Gatorade in a pitcher and stir to dissolve the powder. Chill if desired, but do not add ice. Begin drinking one 8-ounce glass of the Miralax/Gatorade mixture every 15 minutes until the pitcher is empty. Then proceed to step two with second bottle. STEP TWO - Day of procedure 4  hours prior to arrival time, mix 4 capfuls of Miralax with 16 oz. of Gatorade and begin drinking one 8-ounce glass of the Miralax/Gatorade mixture every 15 minutes until gone. 238 g 0    fluocinonide (LIDEX) 0.05 % external ointment Apply twice daily as needed for bug bites on trunk or extremities (Patient not taking: Reported on 7/12/2023) 60 g 2    SF 5000 PLUS 1.1 % CREA BRUSH WITH A SMALL AMOUNT 2 TIMES PER DAY. DO NOT SWALLOW. DO NOT EAT, DRINK OR RINSE FOR 30 MINUTES AFTERWARD.*         Systemic Review:   CONSTITUTIONAL: NEGATIVE for fever, chills, change in weight  ENT/MOUTH: NEGATIVE for ear, mouth and throat problems  RESP: NEGATIVE for significant cough or SOB  CV: NEGATIVE for chest pain, palpitations or peripheral edema    Physical Examination:   Vital Signs: BP (!) 123/90 (BP Location: Right arm)   Pulse 97   Temp 98.5  F (36.9  C) (Temporal)   Resp 18   Ht 1.651 m (5' 5\")   Wt 89.4 kg (197 lb)   SpO2 98%   BMI 32.78 kg/m    GENERAL: healthy, alert and no distress  NECK: no adenopathy, no asymmetry, masses, or scars  RESP: lungs clear to auscultation - no " rales, rhonchi or wheezes  CV: regular rate and rhythm, normal S1 S2, no S3 or S4, no murmur, click or rub, no peripheral edema and peripheral pulses strong  ABDOMEN: soft, nontender, no hepatosplenomegaly, no masses and bowel sounds normal  MS: no gross musculoskeletal defects noted, no edema    ASA: 2    Mallampati Score: 2    Plan: Appropriate to proceed as scheduled.      Mk Garay MD  9/8/2023    PCP:  Yi Vegas

## 2023-09-08 NOTE — INTERVAL H&P NOTE
"I have reviewed the virtual H&P that is linked to this encounter. The physical exam performed by anesthesia during this surgical encounter serves as the physical portion of that the virtual H&P. There are no significant changes from that history and physical as noted.    Clinical Conditions Present on Arrival:  Clinically Significant Risk Factors Present on Admission                  # Obesity: Estimated body mass index is 32.78 kg/m  as calculated from the following:    Height as of this encounter: 1.651 m (5' 5\").    Weight as of this encounter: 89.4 kg (197 lb).       "

## 2023-09-08 NOTE — ANESTHESIA PREPROCEDURE EVALUATION
Anesthesia Pre-Procedure Evaluation    Patient: Tressa Jeong   MRN: [id number] : 1975        Procedure : Procedure(s):  Colonoscopy          Past Medical History:   Diagnosis Date    Depressive disorder       Past Surgical History:   Procedure Laterality Date    ORTHOPEDIC SURGERY        Allergies   Allergen Reactions    Latex     Morphine      Not life treated, burning and painful when administ      Social History     Tobacco Use    Smoking status: Former     Packs/day: 0.50     Years: 10.00     Pack years: 5.00     Types: Cigarettes    Smokeless tobacco: Never   Substance Use Topics    Alcohol use: No      Wt Readings from Last 1 Encounters:   23 89.4 kg (197 lb)        Anesthesia Evaluation            ROS/MED HX  ENT/Pulmonary:  - neg pulmonary ROS     Neurologic:  - neg neurologic ROS     Cardiovascular:       METS/Exercise Tolerance:     Hematologic:  - neg hematologic  ROS     Musculoskeletal:  - neg musculoskeletal ROS     GI/Hepatic:  - neg GI/hepatic ROS   (+)        bowel prep,            Renal/Genitourinary:  - neg Renal ROS     Endo:  - neg endo ROS     Psychiatric/Substance Use:     (+) psychiatric history depression       Infectious Disease:  - neg infectious disease ROS     Malignancy:  - neg malignancy ROS     Other:               OUTSIDE LABS:  CBC:   Lab Results   Component Value Date    HGB 12.6 2023     BMP: No results found for: NA, POTASSIUM, CHLORIDE, CO2, BUN, CR, GLC  COAGS: No results found for: PTT, INR, FIBR  POC:   Lab Results   Component Value Date    HCG Negative 2023     HEPATIC: No results found for: ALBUMIN, PROTTOTAL, ALT, AST, GGT, ALKPHOS, BILITOTAL, BILIDIRECT, MANJEET  OTHER:   Lab Results   Component Value Date    TSH 0.98 2007       Anesthesia Plan    ASA Status:  2       Anesthesia Type: MAC.     - Reason for MAC: immobility needed              Consents    Anesthesia Plan(s) and associated risks, benefits, and realistic alternatives discussed.  Questions answered and patient/representative(s) expressed understanding.     - Discussed: Risks, Benefits and Alternatives for BOTH SEDATION and the PROCEDURE were discussed     - Discussed with:  Patient      - Extended Intubation/Ventilatory Support Discussed: No.      - Patient is DNR/DNI Status: No     Use of blood products discussed: No .     Postoperative Care    Pain management: IV analgesics, Oral pain medications.        Comments:                Gordy Day MD, MD

## 2023-09-08 NOTE — ANESTHESIA CARE TRANSFER NOTE
Patient: Tressa Jeong    Procedure: Procedure(s):  COLONOSCOPY, WITH POLYPECTOMY       Diagnosis: Screen for colon cancer [Z12.11]  Diagnosis Additional Information: No value filed.    Anesthesia Type:   MAC     Note:    Oropharynx: oropharynx clear of all foreign objects and spontaneously breathing  Level of Consciousness: awake  Oxygen Supplementation: room air    Independent Airway: airway patency satisfactory and stable  Dentition: dentition unchanged  Vital Signs Stable: post-procedure vital signs reviewed and stable  Report to RN Given: handoff report given  Patient transferred to: Phase II    Handoff Report: Identifed the Patient, Identified the Reponsible Provider, Reviewed the pertinent medical history, Discussed the surgical course, Reviewed Intra-OP anesthesia mangement and issues during anesthesia, Set expectations for post-procedure period and Allowed opportunity for questions and acknowledgement of understanding      Vitals:  Vitals Value Taken Time   /76    Temp 96.6    Pulse 77    Resp 20    SpO2 97        Electronically Signed By: SHANNON Suggs CRNA  September 8, 2023  8:55 AM

## 2023-09-11 LAB
PATH REPORT.COMMENTS IMP SPEC: NORMAL
PATH REPORT.COMMENTS IMP SPEC: NORMAL
PATH REPORT.FINAL DX SPEC: NORMAL
PATH REPORT.GROSS SPEC: NORMAL
PATH REPORT.MICROSCOPIC SPEC OTHER STN: NORMAL
PATH REPORT.RELEVANT HX SPEC: NORMAL
PHOTO IMAGE: NORMAL

## 2023-09-11 PROCEDURE — 88305 TISSUE EXAM BY PATHOLOGIST: CPT | Mod: 26 | Performed by: PATHOLOGY

## 2023-09-15 ENCOUNTER — VIRTUAL VISIT (OUTPATIENT)
Dept: PSYCHOLOGY | Facility: CLINIC | Age: 48
End: 2023-09-15
Payer: COMMERCIAL

## 2023-09-15 DIAGNOSIS — F41.1 GAD (GENERALIZED ANXIETY DISORDER): ICD-10-CM

## 2023-09-15 DIAGNOSIS — F90.0 ADHD (ATTENTION DEFICIT HYPERACTIVITY DISORDER), INATTENTIVE TYPE: Primary | ICD-10-CM

## 2023-09-15 PROCEDURE — 90837 PSYTX W PT 60 MINUTES: CPT | Mod: VID

## 2023-09-15 NOTE — PROGRESS NOTES
[facility]                                     Progress Note    Patient Name: Tressa Jeong  Date: 9/15/2023         Service Type: Individual      Session Start Time: 10:31 AM  Session End Time: 11:27 AM     Session Length: 53+ min    Session #: 5    Attendees: Client attended alone    Service Modality:  Video Visit:      Provider verified identity through the following two step process.  Patient provided:  Patient is known previously to provider    Telemedicine Visit: The patient's condition can be safely assessed and treated via synchronous audio and visual telemedicine encounter.      Reason for Telemedicine Visit: Patient has requested telehealth visit    Originating Site (Patient Location): Patient's home    Distant Site (Provider Location): Provider Remote Setting- Home Office    Consent:  The patient/guardian has verbally consented to: the potential risks and benefits of telemedicine (video visit) versus in person care; bill my insurance or make self-payment for services provided; and responsibility for payment of non-covered services.     Patient would like the video invitation sent by:  My Chart    Mode of Communication:  Video Conference via [facility]    Distant Location (Provider):  Off-site    As the provider I attest to compliance with applicable laws and regulations related to telemedicine.    DATA  Extended Session (53+ minutes): PROLONGED SERVICE IN THE OUTPATIENT SETTING REQUIRING DIRECT (FACE-TO-FACE) PATIENT CONTACT BEYOND THE USUAL SERVICE:    - Patient's presenting concerns require more intensive intervention than could be completed within the usual service  Interactive Complexity: No  Crisis: No        Progress Since Last Session (Related to Symptoms / Goals / Homework):   Symptoms: No change based on patient self-report.     Homework: Achieved / completed to satisfaction      Episode of Care Goals: Satisfactory progress - CONTEMPLATION (Considering change and yet  undecided); Intervened by assessing the negative and positive thinking (ambivalence) about behavior change     Current / Ongoing Stressors and Concerns:   Patient reports she is concerned about ADHD symptoms. Patient reports she is concerned about motivation. Patient reports she is concerned about working. Patient reports she is concerned about task completion.       Treatment Objective(s) Addressed in This Session:   Patient will learn about the diagnosis and symptoms of ADHD.   Patient will practice setting goals and completing them.  Patient will learn 3 skills to help increase motivation and organization.   Patient will learn about how ADHD can impact social interactions and interpersonal relationships.     Intervention:   Motivational Interviewing: supported patient in processing and exploring concerns about motivation.   Validated patient's concerns. Discussed how homework went. Discussed finding a reason to complete tasks. Discussed reframing the necessity for the animals instead of for herself. Discussed being mindful of whether this increases motivation. Discussed patients capacity to focus for two hours. Discussed setting aside two hour chunks to complete tasks. Discussed choosing the same two hours every day to help create a daily routine.   Supported patient in exploring concerns about medication. Validated patient's emotions. Encouraged patient to schedule a PCP appointment.      Assessments completed prior to visit:  The following assessments were completed by patient for this visit:  PHQ9:       4/7/2016    11:17 AM 9/13/2022     8:51 AM 11/15/2022    11:10 AM 7/11/2023     3:49 PM   PHQ-9 SCORE   PHQ-9 Total Score MyChart  17 (Moderately severe depression)  4 (Minimal depression)   PHQ-9 Total Score 12 17 15 4     GAD7:       4/7/2016    11:17 AM 9/13/2022     3:40 PM 11/15/2022    11:10 AM 12/27/2022    11:02 AM 1/18/2023     3:49 PM 7/12/2023     3:00 PM 8/30/2023     1:00 PM   LYUBOV-7 SCORE   Total  Score  18 (severe anxiety)  14 (moderate anxiety) 7 (mild anxiety)     Total Score 9 18 12 14 7 6 5     PROMIS 10-Global Health (only subscores and total score):       9/13/2022     3:45 PM 11/15/2022    11:18 AM 7/11/2023     4:12 PM   PROMIS-10 Scores Only   Global Mental Health Score  10 11   Global Physical Health Score  15 16   PROMIS TOTAL - SUBSCORES  25 27       Information is confidential and restricted. Go to Review Flowsheets to unlock data.         ASSESSMENT: Current Emotional / Mental Status (status of significant symptoms):   Risk status (Self / Other harm or suicidal ideation)   Patient denies current fears or concerns for personal safety.   Patient denies current or recent suicidal ideation or behaviors.   Patient denies current or recent homicidal ideation or behaviors.   Patient denies current or recent self injurious behavior or ideation.   Patient denies other safety concerns.   Patient reports there has been no change in risk factors since their last session.     Patient reports there has been no change in protective factors since their last session.     Recommended that patient call 911 or go to the local ED should there be a change in any of these risk factors.     Appearance:   Appropriate    Eye Contact:   Good    Psychomotor Behavior: Normal    Attitude:   Cooperative    Orientation:   All   Speech    Rate / Production: Hyperverbal     Volume:  Normal    Mood:    Anxious    Affect:    Worrisome    Thought Content:  Rumination    Thought Form:  Logical    Insight:    Good      Medication Review:   No changes to current psychiatric medication(s)     Medication Compliance:   Yes     Changes in Health Issues:   None reported     Chemical Use Review:   Substance Use: Chemical use reviewed, no active concerns identified      Tobacco Use: No current tobacco use.      Diagnosis:  1. ADHD (attention deficit hyperactivity disorder), inattentive type    2. LYUBOV (generalized anxiety disorder)         Collateral Reports Completed:   Not Applicable    PLAN: (Patient Tasks / Therapist Tasks / Other)  Patient will reframe doing tasks for herself to be for her animals. Patient will be mindful of whether this increases motivation. Patient will practice doing tasks in two hour chunks. Patient will schedule a PCP appointment.  We will discuss next session.         Aline Shabazz, [facility] 9/15/2023    ___________________________________________________________                                              Individual Treatment Plan    Patient's Name: Tressa Jeong  YOB: 1975    Date of Creation: 7/28/2023  Date Treatment Plan Last Reviewed/Revised: 7/28/2023    DSM5 Diagnoses: Attention-Deficit/Hyperactivity Disorder  314.00 (F90.0) Predominantly inattentive presentation or 300.02 (F41.1) Generalized Anxiety Disorder  Psychosocial / Contextual Factors: Patient is currently unemployed. Patient is living with her dad part time. Patient is single.   PROMIS (reviewed every 90 days):The following assessments were completed by patient for this visit:  PROMIS 10-Global Health (only subscores and total score):       9/13/2022     3:45 PM 11/15/2022    11:18 AM 7/11/2023     4:12 PM   PROMIS-10 Scores Only   Global Mental Health Score  10 11   Global Physical Health Score  15 16   PROMIS TOTAL - SUBSCORES  25 27       Information is confidential and restricted. Go to Review Flowsheets to unlock data.        Referral / Collaboration:  Referral to another professional/service is not indicated at this time.    Anticipated number of session for this episode of care:  50  Anticipation frequency of session: Every other week  Anticipated Duration of each session: 38-52 minutes  Treatment plan will be reviewed in 90 days or when goals have been changed.       MeasurableTreatment Goal(s) related to diagnosis / functional impairment(s)  Goal 1: Patient will work on stabilizing symptoms of ADHD.      Objective #A   Patient will learn about the diagnosis and symptoms of ADHD.   Status: New - Date: 7/28/2023    Intervention(s)  Therapist will teach about the diagnosis of ADHD.     Objective #B  Patient will practice setting goals and completing them.                         Status: New - Date: 7/28/2023     Intervention(s)  Therapist will teach SMART goals and how to break tasks down into smaller tasks.     Objective #C  Patient will learn 3 skills to help increase motivation and organization.   Status: New - Date: 7/28/2023    Intervention(s)  Therapist will assign homework of using coping skills to increase motivation and organizational skills.    Objective #D  Patient will learn about how ADHD can impact social interactions and interpersonal relationships.  Status:New -Date: 7/28/2023    Intervention(s)  Therapist will teach about how ADHD can impact social interactions and interpersonal relationships.         Goal 2: Client will work on stabilizing anxiety symptoms as evidenced by LYUBOV-7 scores (current is 6) and Self report.  Goal is to reduce by five points.      I will know I've met my goal when I can better manage my anxiety .      Objective #A Client will identify triggers of anxiety and process them in session.    Status: New - Date: 7/28/2023    Intervention(s)  Therapist will teach emotional recognition/identification by assigning homework to journal with written exercises.    Objective #B  Client will identify initial signs or symptoms of anxiety.    Status: New - Date: 7/28/2023    Intervention(s)  Therapist will teach emotional regulation skills, such as deep breathing and mindfulness skills.    Objective #C  Client will learn about co-morbidities between ADHD and LYUBOV.   Status: New - Date: 7/28/2023    Intervention(s)  Therapist will provide educational materials on symptoms of ADHD and LYUBOV.          Patient has reviewed and agreed to the above plan.      Aline Shabazz, [facility]  July 28, 2023

## 2023-09-21 ASSESSMENT — SLEEP AND FATIGUE QUESTIONNAIRES
HOW LIKELY ARE YOU TO NOD OFF OR FALL ASLEEP WHEN YOU ARE A PASSENGER IN A CAR FOR AN HOUR WITHOUT A BREAK: SLIGHT CHANCE OF DOZING
HOW LIKELY ARE YOU TO NOD OFF OR FALL ASLEEP WHILE SITTING INACTIVE IN A PUBLIC PLACE: WOULD NEVER DOZE
HOW LIKELY ARE YOU TO NOD OFF OR FALL ASLEEP IN A CAR, WHILE STOPPED FOR A FEW MINUTES IN TRAFFIC: WOULD NEVER DOZE
HOW LIKELY ARE YOU TO NOD OFF OR FALL ASLEEP WHILE SITTING AND READING: MODERATE CHANCE OF DOZING
HOW LIKELY ARE YOU TO NOD OFF OR FALL ASLEEP WHILE WATCHING TV: MODERATE CHANCE OF DOZING
HOW LIKELY ARE YOU TO NOD OFF OR FALL ASLEEP WHILE SITTING AND TALKING TO SOMEONE: WOULD NEVER DOZE
HOW LIKELY ARE YOU TO NOD OFF OR FALL ASLEEP WHILE LYING DOWN TO REST IN THE AFTERNOON WHEN CIRCUMSTANCES PERMIT: HIGH CHANCE OF DOZING
HOW LIKELY ARE YOU TO NOD OFF OR FALL ASLEEP WHILE SITTING QUIETLY AFTER LUNCH WITHOUT ALCOHOL: SLIGHT CHANCE OF DOZING

## 2023-09-22 ENCOUNTER — OFFICE VISIT (OUTPATIENT)
Dept: SLEEP MEDICINE | Facility: CLINIC | Age: 48
End: 2023-09-22
Payer: COMMERCIAL

## 2023-09-22 VITALS
SYSTOLIC BLOOD PRESSURE: 127 MMHG | HEIGHT: 65 IN | OXYGEN SATURATION: 98 % | BODY MASS INDEX: 32.99 KG/M2 | HEART RATE: 98 BPM | WEIGHT: 198 LBS | DIASTOLIC BLOOD PRESSURE: 84 MMHG

## 2023-09-22 DIAGNOSIS — G47.33 OSA (OBSTRUCTIVE SLEEP APNEA): Primary | ICD-10-CM

## 2023-09-22 DIAGNOSIS — G47.19 EXCESSIVE DAYTIME SLEEPINESS: ICD-10-CM

## 2023-09-22 DIAGNOSIS — R06.83 SNORING: ICD-10-CM

## 2023-09-22 PROCEDURE — 99204 OFFICE O/P NEW MOD 45 MIN: CPT | Performed by: INTERNAL MEDICINE

## 2023-09-22 RX ORDER — DEXTROAMPHETAMINE SACCHARATE, AMPHETAMINE ASPARTATE, DEXTROAMPHETAMINE SULFATE AND AMPHETAMINE SULFATE 1.25; 1.25; 1.25; 1.25 MG/1; MG/1; MG/1; MG/1
TABLET ORAL
COMMUNITY
Start: 2023-08-31 | End: 2024-04-09

## 2023-09-22 RX ORDER — CLONAZEPAM 0.5 MG/1
0.5 TABLET ORAL DAILY PRN
COMMUNITY
Start: 2023-08-31

## 2023-09-22 NOTE — PATIENT INSTRUCTIONS
"          MY TREATMENT INFORMATION FOR SLEEP APNEA-  Tressa Jeong    DOCTOR : TONY MONAHAN MD    Am I having a sleep study at a sleep center?  --->Due to normal delays, you will be contacted within 2-4 weeks to schedule    Am I having a home sleep study?  --->Watch the video for the device you are using:    Frequently asked questions:  1. What is Obstructive Sleep Apnea (PEGGY)? PEGGY is the most common type of sleep apnea. Apnea means, \"without breath.\"  Apnea is most often caused by narrowing or collapse of the upper airway as muscles relax during sleep.   Almost everyone has occasional apneas. Most people with sleep apnea have had brief interruptions at night frequently for many years.  The severity of sleep apnea is related to how frequent and severe the events are.   2. What are the consequences of PEGGY? Symptoms include: feeling sleepy during the day, snoring loudly, gasping or stopping of breathing, trouble sleeping, and occasionally morning headaches or heartburn at night.  Sleepiness can be serious and even increase the risk of falling asleep while driving. Other health consequences may include development of high blood pressure and other cardiovascular disease in persons who are susceptible. Untreated PEGGY  can contribute to heart disease, stroke and diabetes.   3. What are the treatment options? In most situations, sleep apnea is a lifelong disease that must be managed with daily therapy. Medications are not effective for sleep apnea and surgery is generally not considered until other therapies have been tried. Your treatment is your choice . Continuous Positive Airway (CPAP) works right away and is the therapy that is effective in nearly everyone. An oral device to hold your jaw forward is usually the next most reliable option. Other options include postioning devices (to keep you off your back), weight loss, and surgery including a tongue pacing device. There is more detail about some of these options " below.  4. Are my sleep studies covered by insurance? Although we will request verification of coverage, we advise you also check in advance of the study to ensure there is coverage.    Important tips for those choosing CPAP and similar devices  For new devices, sign up for device JACKSON to monitor your device for your followup visits  We encourage you to utilize the [employer] jackson or website (myAir web (resmed.com) ) to monitor your therapy progress and share the data with your healthcare team when you discuss your sleep apnea.                                                    Know your equipment:  CPAP is continuous positive airway pressure that prevents obstructive sleep apnea by keeping the throat from collapsing while you are sleeping. In most cases, the device is  smart  and can slowly self-adjusts if your throat collapses and keeps a record every day of how well you are treated-this information is available to you and your care team.  BPAP is bilevel positive airway pressure that keeps your throat open and also assists each breath with a pressure boost to maintain adequate breathing.  Special kinds of BPAP are used in patients who have inadequate breathing from lung or heart disease. In most cases, the device is  smart  and can slowly self-adjusts to assist breathing. Like CPAP, the device keeps a record of how well you are treated.  Your mask is your connection to the device. You get to choose what feels most comfortable and the staff will help to make sure if fits. Here: are some examples of the different masks that are available:       Key points to remember on your journey with sleep apnea:  Sleep study.  PAP devices often need to be adjusted during a sleep study to show that they are effective and adjusted right.  Good tips to remember: Try wearing just the mask during a quiet time during the day so your body adapts to wearing it. A humidifier is recommended for comfort in most cases to prevent drying  of your nose and throat. Allergy medication from your provider may help you if you are having nasal congestion.  Getting settled-in. It takes more than one night for most of us to get used to wearing a mask. Try wearing just the mask during a quiet time during the day so your body adapts to wearing it. A humidifier is recommended for comfort in most cases. Our team will work with you carefully on the first day and will be in contact within 4 days and again at 2 and 4 weeks for advice and remote device adjustments. Your therapy is evaluated by the device each day.   Use it every night. The more you are able to sleep naturally for 7-8 hours, the more likely you will have good sleep and to prevent health risks or symptoms from sleep apnea. Even if you use it 4 hours it helps. Occasionally all of us are unable to use a medical therapy, in sleep apnea, it is not dangerous to miss one night.   Communicate. Call our skilled team on the number provided on the first day if your visit for problems that make it difficult to wear the device. Over 2 out of 3 patients can learn to wear the device long-term with help from our team. Remember to call our team or your sleep providers if you are unable to wear the device as we may have other solutions for those who cannot adapt to mask CPAP therapy. It is recommended that you sleep your sleep provider within the first 3 months and yearly after that if you are not having problems.   Use it for your health. We encourage use of CPAP masks during daytime quiet periods to allow your face and brain to adapt to the sensation of CPAP so that it will be a more natural sensation to awaken to at night or during naps. This can be very useful during the first few weeks or months of adapting to CPAP though it does not help medically to wear CPAP during wakefulness and  should not be used as a strategy just to meet guidelines.  Take care of your equipment. Make sure you clean your mask and tubing  using directions every day and that your filter and mask are replaced as recommended or if they are not working.     BESIDES CPAP, WHAT OTHER THERAPIES ARE THERE?    Positioning Device  Positioning devices are generally used when sleep apnea is mild and only occurs on your back.This example shows a pillow that straps around the waist. It may be appropriate for those whose sleep study shows milder sleep apnea that occurs primarily when lying flat on one's back. Preliminary studies have shown benefit but effectiveness at home may need to be verified by a home sleep test. These devices are generally not covered by medical insurance.  Examples of devices that maintain sleeping on the back to prevent snoring and mild sleep apnea.    Belt type body positioner  [email or web address].SendUs/    Electronic reminder  http://nightshifttherapy.com/            Oral Appliance  What is oral appliance therapy?  An oral appliance device fits on your teeth at night like a retainer used after having braces. The device is made by a specialized dentist and requires several visits over 1-2 months before a manufactured device is made to fit your teeth and is adjusted to prevent your sleep apnea. Once an oral device is working properly, snoring should be improved. A home sleep test may be recommended at that time if to determine whether the sleep apnea is adequately treated.       Some things to remember:  -Oral devices are often, but not always, covered by your medical insurance. Be sure to check with your insurance provider.   -If you are referred for oral therapy, you will be given a list of specialized dentists to consider or you may choose to visit the Web site of the American Academy of Dental Sleep Medicine  -Oral devices are less likely to work if you have severe sleep apnea or are extremely overweight.     More detailed information  An oral appliance is a small acrylic device that fits over the upper and lower teeth  (similar to a retainer  or a mouth guard). This device slightly moves jaw forward, which moves the base of the tongue forward, opens the airway, improves breathing for effective treat snoring and obstructive sleep apnea in perhaps 7 out of 10 people .  The best working devices are custom-made by a dental device  after a mold is made of the teeth 1, 2, 3.  When is an oral appliance indicated?  Oral appliance therapy is recommended as a first-line treatment for patients with primary snoring, mild sleep apnea, and for patients with moderate sleep apnea who prefer appliance therapy to use of CPAP4, 5. Severity of sleep apnea is determined by sleep testing and is based on the number of respiratory events per hour of sleep.   How successful is oral appliance therapy?  The success rate of oral appliance therapy in patients with mild sleep apnea is 75-80% while in patients with moderate sleep apnea it is 50-70%. The chance of success in patients with severe sleep apnea is 40-50%. The research also shows that oral appliances have a beneficial effect on the cardiovascular health of PEGGY patients at the same magnitude as CPAP therapy7.  Oral appliances should be a second-line treatment in cases of severe sleep apnea, but if not completely successful then a combination therapy utilizing CPAP plus oral appliance therapy may be effective. Oral appliances tend to be effective in a broad range of patients although studies show that the patients who have the highest success are females, younger patients, those with milder disease, and less severe obesity. 3, 6.   Finding a dentist that practices dental sleep medicine  Specific training is available through the American Academy of Dental Sleep Medicine for dentists interested in working in the field of sleep. To find a dentist who is educated in the field of sleep and the use of oral appliances, near you, visit the Web site of the American Academy of Dental Sleep Medicine.    References  1.  Willie, et al. Objectively measured vs self-reported compliance during oral appliance therapy for sleep-disordered breathing. Chest 2013; 144(5): 5222-3190.  2. Jacy, et al. Objective measurement of compliance during oral appliance therapy for sleep-disordered breathing. Thorax 2013; 68(1): 91-96.  3. Alanna et al. Mandibular advancement devices in 620 men and women with PEGGY and snoring: tolerability and predictors of treatment success. Chest 2004; 125: 0071-7471.  4. Paresh et al. Oral appliances for snoring and PEGGY: a review. Sleep 2006; 29: 244-262.  5. Dedra, et al. Oral appliance treatment for PEGGY: an update. J Clin Sleep Med 2014; 10(2): 215-227.  6. Rodrigo et al. Predictors of OSAH treatment outcome. J Dent Res 2007; 86: 7817-0760.      Weight Loss:    Weight loss is a long-term strategy that may improve sleep apnea in some patients.    Weight management is a personal decision and the decision should be based on your interest and the potential benefits.  If you are interested in exploring weight loss strategies, the following discussion covers the impact on weight loss on sleep apnea and the approaches that may be successful.    Being overweight does not necessarily mean you will have health consequences.  Those who have BMI over 35 or over 27 with existing medical conditions carries greater risk.   Weight loss decreases severity of sleep apnea in most people with obesity. For those with mild obesity who have developed snoring with weight gain, even 15-30 pound weight loss can improve and occasionally eliminate sleep apnea.  Structured and life-long dietary and health habits are necessary to lose weight and keep healthier weight levels.     Though there may be significant health benefits from weight loss, long-term weight loss is very difficult to achieve- studies show success with dietary management in less than 10% of people. In addition, substantial weight loss may require years of  dietary control and may be difficult if patients have severe obesity. In these cases, surgical management may be considered.  Finally, older individuals who have tolerated obesity without health complications may be less likely to benefit from weight loss strategies.      [unfilled]    Surgery:    Surgery for obstructive sleep apnea is considered generally only when other therapies fail to work. Surgery may be discussed with you if you are having a difficult time tolerating CPAP and or when there is an abnormal structure that requires surgical correction.  Nose and throat surgeries often enlarge the airway to prevent collapse.  Most of these surgeries create pain for 1-2 weeks and up to half of the most common surgeries are not effective throughout life.  You should carefully discuss the benefits and drawbacks to surgery with your sleep provider and surgeon to determine if it is the best solution for you.   More information  Surgery for PEGGY is directed at areas that are responsible for narrowing or complete obstruction of the airway during sleep.  There are a wide range of procedures available to enlarge and/or stabilize the airway to prevent blockage of breathing in the three major areas where it can occur: the palate, tongue, and nasal regions.  Successful surgical treatment depends on the accurate identification of the factors responsible for obstructive sleep apnea in each person.  A personalized approach is required because there is no single treatment that works well for everyone.  Because of anatomic variation, consultation with an examination by a sleep surgeon is a critical first step in determining what surgical options are best for each patient.  In some cases, examination during sedation may be recommended in order to guide the selection of procedures.  Patients will be counseled about risks and benefits as well as the typical recovery course after surgery. Surgery is typically not a cure for a person s  PEGGY.  However, surgery will often significantly improve one s PEGGY severity (termed  success rate ).  Even in the absence of a cure, surgery will decrease the cardiovascular risk associated with OSA7; improve overall quality of life8 (sleepiness, functionality, sleep quality, etc).      Palate Procedures:  Patients with PEGGY often have narrowing of their airway in the region of their tonsils and uvula.  The goals of palate procedures are to widen the airway in this region as well as to help the tissues resist collapse.  Modern palate procedure techniques focus on tissue conservation and soft tissue rearrangement, rather than tissue removal.  Often the uvula is preserved in this procedure. Residual sleep apnea is common in patient after pharyngoplasty with an average reduction in sleep apnea events of 33%2.      Tongue Procedures:  ExamWhile patients are awake, the muscles that surround the throat are active and keep this region open for breathing. These muscles relax during sleep, allowing the tongue and other structures to collapse and block breathing.  There are several different tongue procedures available.  Selection of a tongue base procedure depends on characteristics seen on physical exam.  Generally, procedures are aimed at removing bulky tissues in this area or preventing the back of the tongue from falling back during sleep.  Success rates for tongue surgery range from 50-62%3.    Hypoglossal Nerve Stimulation:  Hypoglossal nerve stimulation has recently received approval from the [place] Food and Drug Administration for the treatment of obstructive sleep apnea.  This is based on research showing that the system was safe and effective in treating sleep apnea6.  Results showed that the median AHI score decreased 68%, from 29.3 to 9.0. This therapy uses an implant system that senses breathing patterns and delivers mild stimulation to airway muscles, which keeps the airway open during sleep.  The system  consists of three fully implanted components: a small generator (similar in size to a pacemaker), a breathing sensor, and a stimulation lead.  Using a small handheld remote, a patient turns the therapy on before bed and off upon awakening.    Candidates for this device must be greater than 18 years of age, have moderate to severe PEGGY (AHI between 15-65), BMI less than 35, have tried CPAP/oral appliance for at least 8 weeks without success, and have appropriate upper airway anatomy (determined by a sleep endoscopy performed by Dr. David Garcia).    Hypoglossal Nerve Stimulation Pathway:    The sleep surgeon s office will work with the patient through the insurance prior-authorization process (including communications and appeals).    Nasal Procedures:  Nasal obstruction can interfere with nasal breathing during the day and night.  Studies have shown that relief of nasal obstruction can improve the ability of some patients to tolerate positive airway pressure therapy for obstructive sleep apnea1.  Treatment options include medications such as nasal saline, topical corticosteroid and antihistamine sprays, and oral medications such as antihistamines or decongestants. Non-surgical treatments can include external nasal dilators for selected patients. If these are not successful by themselves, surgery can improve the nasal airway either alone or in combination with these other options.      Combination Procedures:  Combination of surgical procedures and other treatments may be recommended, particularly if patients have more than one area of narrowing or persistent positional disease.  The success rate of combination surgery ranges from 66-80%2,3.    References  Kyra SAMSON. The Role of the Nose in Snoring and Obstructive Sleep Apnoea: An Update.  Eur Arch Otorhinolaryngol. 2011; 268: 1365-73.   Andree SM; Marguerite JA; Oksana JR; Pallanch JF; Eric OJEDA; Ada PAN; Clementine TAVARES. Surgical modifications of the upper airway for  obstructive sleep apnea in adults: a systematic review and meta-analysis. SLEEP 2010;33(10):6792-7627. Zabrina JACOB. Hypopharyngeal surgery in obstructive sleep apnea: an evidence-based medicine review.  Arch Otolaryngol Head Neck Surg. 2006 Feb;132(2):206-13.  Diego YH1, [place] Y, Sandip APOLINAR. The efficacy of anatomically based multilevel surgery for obstructive sleep apnea. Otolaryngol Head Neck Surg. 2003 Oct;129(4):327-35.  Kezirian E, Goldberg A. Hypopharyngeal Surgery in Obstructive Sleep Apnea: An Evidence-Based Medicine Review. Arch Otolaryngol Head Neck Surg. 2006 Feb;132(2):206-13.  Kun WILLIAM et al. Upper-Airway Stimulation for Obstructive Sleep Apnea.  N Engl J Med. 2014 Jan 9;370(2):139-49.  Annabella Y et al. Increased Incidence of Cardiovascular Disease in Middle-aged Men with Obstructive Sleep Apnea. Am J Respir Crit Care Med; 2002 166: 159-165  Cardoso EM et al. Studying Life Effects and Effectiveness of Palatopharyngoplasty (SLEEP) study: Subjective Outcomes of Isolated Uvulopalatopharyngoplasty. Otolaryngol Head Neck Surg. 2011; 144: 623-631.        WHAT IF I ONLY HAVE SNORING?    Mandibular advancement devices, lateral sleep positioning, long-term weight loss and treatment of nasal allergies have been shown to improve snoring.  Exercising tongue muscles with a game (https://apps.FFWD/us/jackson/soundly-reduce-snoring/zx3088993671) or stimulating the tongue during the day with a device (https://doi.org/10.3390/gtb46858954) have improved snoring in some individuals.    Remember to Drive Safe... Drive Alive     Sleep health profoundly affects your health, mood, and your safety.  Thirty three percent of the population (one in three of us) is not getting enough sleep and many have a sleep disorder. Not getting enough sleep or having an untreated / undertreated sleep condition may make us sleepy without even knowing it. In fact, our driving could be dramatically impaired due to our sleep health. As your provider,  here are some things I would like you to know about driving:     Here are some warning signs for impairment and dangerous drowsy driving:              -Having been awake more than 16 hours               -Looking tired               -Eyelid drooping              -Head nodding (it could be too late at this point)              -Driving for more than 30 minutes     Some things you could do to make the driving safer if you are experiencing some drowsiness:              -Stop driving and rest              -Call for transportation              -Make sure your sleep disorder is adequately treated     Some things that have been shown NOT to work when experiencing drowsiness while driving:              -Turning on the radio              -Opening windows              -Eating any  distracting  /  entertaining  foods (e.g., sunflower seeds, candy, or any other)              -Talking on the phone      Your decision may not only impact your life, but also the life of others. Please, remember to drive safe for yourself and all of us.

## 2023-09-22 NOTE — PROGRESS NOTES
"    Name: Tressa Jeong MRN# [id number]   Age: 48 year old YOB: 1975     Date of Consultation: September 22, 2023  Consultation is requested by: Yi Vegas MD  [address] [place] [id number] Yi Terry*  Primary care provider: Yi Vegas   Mental health provider Eva Candelaria [facility] ([facility]       Reason for Sleep Consult:       Patient s Reason for visit  Tressa Jeong main reason for visit: problems with late sleep pattern insomnia fatigue possible sleep apnea  Patient states problem(s) started: lifelong  Tressa Jeong's goals for this visit: i thought i was being referred for a sleep study so that is my goal! i think i very likely have sleep apnea, my mom has it and we are very physically similar.  i would love to sleep more \"normal\" hours and wake up rested. waking up fatigued is an issue.           Assessment and Plan:     Summary Sleep Diagnoses:  Insomnia (SENDY 21)  Remarkable sleep-wake phase delay with maximal change 4:30 am to noon during the pandemic   Clinical signs symptoms of obstructive sleep apnea  Parasomnia-night eating syndrome     Comorbid Diagnoses:  Attention deficit and activity disorder currently on stimulants 4 month  Allergic rhinitis   Summary Recommendations(things to be done):  Avoid screens and consider covering Skylight after 7 PM particularly in the summer months  Polysomnography with My Chart followup to determine next steps  Take clonazepam on the night of the test before sleep  Plan work [id number] to 6 pm and sleep time 2am to 9:30 am   Read through information on sleep apnea therapy so that you can be prepared with a choice if moderate or severe sleep apnea is diagnosed; the results of the study will recommend some specific options for you to consider.         HISTORY PRESENT ILLNESS:     Tressa Jeong is a 48 year old year old with difficulty initiating and maintaining sleep and history of prominent delayed " sleep wake phase with unsuccessful attempts to advance with sleep hygiene measures..  She has some sleep disruption and occasional TV use at night typically with illumination off or sound only as well as Skylight over her bed with sunlight in the evenings.  She also has had occasional sleep eating at night without features of restless leg syndrome.  She is concerned this may have occasionally caused her to gain weight.  She has vague knowledge of getting cheese in the refrigerator but does not always of aware of the events.  She has been noted by cosleeper is to have very loud snoring every night although observed apneas have not been noted.      For recently diagnosed attention deficit disorder, she was started initially on methylphenidate and subsequently switched to on amphetamine 5 -10 mg by her mental health provider Eva Candelaria.             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 If I am actually tired 20 minutes to fall asleep  Has trouble falling asleep every night nights per week  Wakes up in the middle of the night 2-3 times usually but occasionally have nights where its 8-20 times times.  Wakes up due to External stimuli (bed partner, pets, noise, etc), Use the bathroom, Anxiety, Other  She has trouble falling back asleep none times a week.   It usually takes 5 minutes to get back to sleep  Patient is usually up at 11am  Uses alarm: Yes    Weekends/Non-work Days/All Other Days:  Usually gets into bed at I am not currently working so same as previous answers   Takes patient about   to fall asleep  Patient is usually up at    Uses alarm:      Sleep Need  Patient gets  7-9 hours sleep on average   Patient thinks she needs about 7-9 hours  "sleep    Tressa Jeong prefers to sleep in this position(s): Stomach   Patient states they do the following activities in bed: Read, Watch TV, Use phone, computer, or tablet    Naps  Patient takes a purposeful nap 0 times a week and naps are usually 20-60 minutes? in duration  She feels better after a nap: Yes  She dozes off unintentionally maybe once every 2-3 weeks in the evening days per week  Patient has had a driving accident or near-miss due to sleepiness/drowsiness: No      SLEEP DISRUPTIONS:    Breathing/Snoring    Snoring:Yes  Other people complain about her snoring: Yes  Others observeshe stops breathing in her sleep: No  She has issues with the following: Morning mouth dryness, Stuffy nose when you wake up, Getting up to urinate more than once    Movement:    Pain, discomfort, with an urge to move:  No  Happens when she is resting:  No  Happens more at night:  No  Patient has been told she kicks her legs at night:  No     Behaviors in Wakefulness/Sleep:    Dream enactment   No  Sleep eating   No  Bruxism  No                    Tressa Jeong has experienced the following behaviors while sleeping: Recurring Nightmares, Eating  She has experienced sudden muscle weakness during the day: No      Is there anything else you would like your sleep provider to know: i think the first priority is assessing if I have sleep apnea and then addressing the problem sleeping much later than ideal.      CAFFEINE AND OTHER SUBSTANCES:    Patient consumes caffeinated beverages per day:  2 coffees no other caffeine beverages  Last caffeine use is usually: not later than 2pm  Uses TV with screen off - you tube stories  List of any prescribed or over the counter stimulants that patient takes: I just began taking 5mg adderall for my adhd last week. My \"night owl\" issues have been lifelong. I began Wellbutrin six months ago and it has helped with my fatigue and I more rested since taking it. Prior to that I ALWAYS woke up " tired.  List of any prescribed or over the counter sleep medication patient takes: none  List of previous sleep medications that patient has tried: benedryl, melatonin, valerian, cbd  Patient drinks alcohol to help them sleep: No  Patient drinks alcohol near bedtime: No    Family History:  Patient has a family member been diagnosed with a sleep disorder: Yes  mom has sleep apnea              SCALES:       EPWORTH SLEEPINESS SCALE         9/21/2023     2:32 PM    Valera Sleepiness Scale ( DESTINEY Norton  6960-1426<br>ESS - [place]/English - Final version - 21 Nov 07 - [facility] [place].)   Sitting and reading Moderate chance of dozing   Watching TV Moderate chance of dozing   Sitting, inactive in a public place (e.g. a theatre or a meeting) Would never doze   As a passenger in a car for an hour without a break Slight chance of dozing   Lying down to rest in the afternoon when circumstances permit High chance of dozing   Sitting and talking to someone Would never doze   Sitting quietly after a lunch without alcohol Slight chance of dozing   In a car, while stopped for a few minutes in traffic Would never doze   Valera Score (MC) 9   Valera Score (Sleep) 9         INSOMNIA SEVERITY INDEX (SENDY)          9/21/2023     1:59 PM   Insomnia Severity Index (SENDY)   Difficulty falling asleep 4   Difficulty staying asleep 2   Problems waking up too early 0   How SATISFIED/DISSATISFIED are you with your CURRENT sleep pattern? 4   How NOTICEABLE to others do you think your sleep problem is in terms of impairing the quality of your life? 4   How WORRIED/DISTRESSED are you about your current sleep problem? 3   To what extent do you consider your sleep problem to INTERFERE with your daily functioning (e.g. daytime fatigue, mood, ability to function at work/daily chores, concentration, memory, mood, etc.) CURRENTLY? 4   SENDY Total Score 21       Guidelines for Scoring/Interpretation:  Total score categories:  0-7 = No clinically  significant insomnia   8-14 = Subthreshold insomnia   15-21 = Clinical insomnia (moderate severity)  22-28 = Clinical insomnia (severe)  Used via courtesy of www.myhealth.va.gov with permission from Aba Moon PhD., [facility]      STOP BANG         9/21/2023     2:38 PM   STOP BANG Questionnaire (  2008, the American Society of Anesthesiologists, Inc. [employer]   1. Snoring - Do you snore loudly (louder than talking or loud enough to be heard through closed doors)? Yes   2. Tired - Do you often feel tired, fatigued, or sleepy during daytime? Yes   3. Observed - Has anyone observed you stop breathing during your sleep? No   4. Blood pressure - Do you have or are you being treated for high blood pressure? No   5. BMI - BMI more than 35 kg/m2? No   6. Age - Age over 50 yr old? No   7. Neck circumference - Neck circumference greater than 40 cm? No   8. Gender - Gender male? No   STOP BANG Score (MC): 3 (High risk of PEGGY)         GAD7        8/30/2023     1:00 PM   LYUBOV-7    1. Feeling nervous, anxious, or on edge 1   2. Not being able to stop or control worrying 0   3. Worrying too much about different things 0   4. Trouble relaxing 3   5. Being so restless that it is hard to sit still 0   6. Becoming easily annoyed or irritable 1   7. Feeling afraid, as if something awful might happen 0   LYUBOV-7 Total Score 5   If you checked any problems, how difficult have they made it for you to do your work, take care of things at home, or get along with other people? Somewhat difficult         CAGE-AID        11/15/2022    11:10 AM   CAGE-AID Flowsheet   Have you ever felt you should Cut down on your drinking or drug use? 1   Have people Annoyed you by criticizing your drinking or drug use? 0   Have you ever felt bad or Guilty about your drinking or drug use? 1   Have you ever had a drink or used drugs first thing in the morning to steady your nerves or to get rid of a hangover? (Eye opener) 1  "  CAGE-AID SCORE 3       CAGE-AID reprinted with permission from the [place] Medical Journal, MACKENZIE Stephens. and JOSEF Baum, \"Conjoint screening questionnaires for alcohol and drug abuse\" [place] Medical Journal 94: 135-140, 1995.      PATIENT HEALTH QUESTIONNAIRE-9 (PHQ - 9)        7/11/2023     3:49 PM   PHQ-9 ([employer])   1.  Little interest or pleasure in doing things 0   2.  Feeling down, depressed, or hopeless 0   3.  Trouble falling or staying asleep, or sleeping too much 2   4.  Feeling tired or having little energy 1   5.  Poor appetite or overeating 0   6.  Feeling bad about yourself - or that you are a failure or have let yourself or your family down 0   7.  Trouble concentrating on things, such as reading the newspaper or watching television 1   8.  Moving or speaking so slowly that other people could have noticed. Or the opposite - being so fidgety or restless that you have been moving around a lot more than usual 0   9.  Thoughts that you would be better off dead, or of hurting yourself in some way 0   PHQ-9 Total Score 4   6.  Feeling bad about yourself 0   7.  Trouble concentrating 1   8.  Moving slowly or restless 0   9.  Suicidal or self-harm thoughts 0   1.  Little interest or pleasure in doing things Not at all   2.  Feeling down, depressed, or hopeless Not at all   3.  Trouble falling or staying asleep, or sleeping too much More than half the days   4.  Feeling tired or having little energy Several days   5.  Poor appetite or overeating Not at all   6.  Feeling bad about yourself Not at all   7.  Trouble concentrating Several days   8.  Moving slowly or restless Not at all   9.  Suicidal or self-harm thoughts Not at all   PHQ-9 via [employer] TOTAL SCORE-----> 4 (Minimal depression)   Difficulty at work, home, or with people Somewhat difficult       Developed by Cody Ch, Negin House, Michael Ramirez and colleagues, with an educational kirk from [employer] No permission required " to reproduce, translate, display or distribute.        Allergies:    Allergies   Allergen Reactions    Latex     Morphine      Not life treated, burning and painful when administ            Problem List:     Patient Active Problem List   Diagnosis    NO ACTIVE PROBLEMS    Hx of abnormal cervical Pap smear            MEDICATIONS:       Current Outpatient Medications   Medication Sig Dispense Refill    amphetamine-dextroamphetamine (ADDERALL) 5 MG tablet Take 1 tablet by mouth daily in the morning for at least 7 days, can increase to 10 mg daily if tolerated.*      betamethasone valerate (VALISONE) 0.1 % external ointment Apply topically 2 times daily Apply twice daily as needed for bug bites. 45 g 2    buPROPion (WELLBUTRIN XL) 150 MG 24 hr tablet Take 1 tablet every day by oral route.*      clonazePAM (KLONOPIN) 0.5 MG tablet Take 0.5 mg by mouth daily as needed      fluocinonide (LIDEX) 0.05 % external ointment Apply twice daily as needed for bug bites on trunk or extremities 60 g 2    SF 5000 PLUS 1.1 % CREA BRUSH WITH A SMALL AMOUNT 2 TIMES PER DAY. DO NOT SWALLOW. DO NOT EAT, DRINK OR RINSE FOR 30 MINUTES AFTERWARD.*         Problem List:  Patient Active Problem List    Diagnosis Date Noted    Hx of abnormal cervical Pap smear 01/25/2023     Priority: Medium     Hx of abnormal pap as a 18 yo, no results to review  09/24/07 NIL Pap  10/30/18 NIL Pap, Neg HR HPV  01/18/23 NIL Pap, Neg HR HPV . Plan: cotest in 5 years        NO ACTIVE PROBLEMS 09/24/2007     Priority: Medium        Past Medical/Surgical History:  Past Medical History:   Diagnosis Date    Depressive disorder      Past Surgical History:   Procedure Laterality Date    COLONOSCOPY N/A 9/8/2023    Procedure: COLONOSCOPY, WITH POLYPECTOMY;  Surgeon: Mk Garay MD;  Location: [facility] OR    ORTHOPEDIC SURGERY         Social History:  Social History     Socioeconomic History    Marital status: Single     Spouse name: Not on file    Number of children: Not  on file    Years of education: Not on file    Highest education level: Not on file   Occupational History    Not on file   Tobacco Use    Smoking status: Former     Packs/day: 0.50     Years: 10.00     Pack years: 5.00     Types: Cigarettes    Smokeless tobacco: Never   Vaping Use    Vaping Use: Never used   Substance and Sexual Activity    Alcohol use: No    Drug use: Not Currently     Types: Marijuana    Sexual activity: Not Currently     Partners: Male   Other Topics Concern    Parent/sibling w/ CABG, MI or angioplasty before 65F 55M? Not Asked   Social History Narrative    Not on file     Social Determinants of Health     Financial Resource Strain: Not on file   Food Insecurity: Not on file   Transportation Needs: Not on file   Physical Activity: Not on file   Stress: Not on file   Social Connections: Not on file   Interpersonal Safety: Not on file   Housing Stability: Not on file       Family History:  Family History   Problem Relation Age of Onset    Skin Cancer Mother     Bipolar Disorder Mother     Hypertension Mother     Psychotic Disorder Mother     Suicide Maternal Uncle     Psychotic Disorder Maternal Uncle         x4    Suicide Maternal Uncle     Psychotic Disorder Paternal Uncle         x1    Glaucoma No family hx of        Review of Systems:  A complete review of systems reviewed by me is negative with the exeption of what has been mentioned in the history of present illness.  In the last TWO WEEKS have you experienced any of the following symptoms?  Fevers: No  Night Sweats: No  Weight Gain: No  Pain at Night: No  Double Vision: No  Changes in Vision: No  Difficulty Breathing through Nose: Yes  Sore Throat in Morning: No  Dry Mouth in the Morning: Yes  Shortness of Breath Lying Flat: No  Shortness of Breath With Activity: No  Awakening with Shortness of Breath: Yes  Increased Cough: No  Heart Racing at Night: No  Swelling in Feet or Legs: No  Diarrhea at Night: No  Heartburn at Night: No  Urinating  More than Once at Night: Yes  Losing Control of Urine at Night: No  Joint Pains at Night: No  Headaches in Morning: Yes  Weakness in Arms or Legs: No  Depressed Mood: No  Anxiety: Yes          Physical Examination:     Mandibular profile mild retrognathia    GENERAL: Healthy, alert and no distress  EYES: Eyes grossly normal to inspection.  No discharge or erythema, or obvious scleral/conjunctival abnormalities.  RESP: No audible wheeze, cough, or visible cyanosis.  No visible retractions or increased work of breathing.    SKIN: Visible skin clear. No significant rash, abnormal pigmentation or lesions.  NEURO: Cranial nerves grossly intact.  Mentation and speech appropriate for age.  PSYCH: Mentation appears normal, affect normal/bright, judgement and insight intact, normal speech and appearance well-groomed.                  Data: All pertinent previous laboratory data reviewed     No results for input(s): NA, POTASSIUM, CHLORIDE, CO2, ANIONGAP, GLC, BUN, CR, AMAN in the last 51081 hours.    Recent Labs   Lab Test 03/21/23  1319   HGB 12.6       No results for input(s): PROTTOTAL, ALBUMIN, BILITOTAL, ALKPHOS, AST, ALT, BILIDIRECT in the last 29827 hours.    TSH (mU/L)   Date Value   09/24/2007 0.98           PFT: Most Recent [facility] Pulmonary Function Testing    FVC-Pred   Date Value Ref Range Status   03/21/2023 3.34 L      FVC-Pre   Date Value Ref Range Status   03/21/2023 3.54 L      FVC-%Pred-Pre   Date Value Ref Range Status   03/21/2023 105 %      FEV1-Pre   Date Value Ref Range Status   03/21/2023 2.79 L      FEV1-%Pred-Pre   Date Value Ref Range Status   03/21/2023 102 %      FEV1FVC-Pred   Date Value Ref Range Status   03/21/2023 82 %      FEV1FVC-Pre   Date Value Ref Range Status   03/21/2023 79 %      No results found for: 20029  FEFMax-Pred   Date Value Ref Range Status   03/21/2023 6.95 L/sec      FEFMax-Pre   Date Value Ref Range Status   03/21/2023 6.50 L/sec      FEFMax-%Pred-Pre   Date Value Ref Range  Status   03/21/2023 93 %      ExpTime-Pre   Date Value Ref Range Status   03/21/2023 7.10 sec      FIFMax-Pre   Date Value Ref Range Status   03/21/2023 3.53 L/sec      FEV1FEV6-Pred   Date Value Ref Range Status   03/21/2023 83 %      FEV1FEV6-Pre   Date Value Ref Range Status   03/21/2023 79 %      No results found for: 20055      TONY MONAHAN MD 9/22/2023     Total time spent reviewing medical records including previous testing and interpretation as well as direct patient contact and documentation on this date:45 min

## 2023-09-27 NOTE — PROGRESS NOTES
Assessment/Plan  (H52.13) Myopia of both eyes  (primary encounter diagnosis)  Comment: Patient wears glasses and rarely CLs. Ocular health WNL today.   Plan: Discussed findings with patient. SRx updated and released. Patient will likely RTC for CL fitting only. Patient should RTC with any new flashes/floaters/curtain over vision.       Complete documentation of historical and exam elements from today's encounter can  be found in the full encounter summary report (not reduplicated in this progress  note). I personally obtained the chief complaint(s) and history of present illness. I  confirmed and edited as necessary the review of systems, past medical/surgical  history, family history, social history, and examination findings as documented by  others; and I examined the patient myself. I personally reviewed the relevant tests,  images, and reports as documented above. I formulated and edited as necessary the  assessment and plan and discussed the findings and management plan with the  patient and family.    Fortino Fleimng, OD    Advancement-Rotation Flap Text: The defect edges were debeveled with a #15 scalpel blade. Given the location of the defect, shape of the defect and the proximity to free margins an advancement-rotation flap was deemed most appropriate. Using a sterile surgical marker, an appropriate flap was drawn incorporating the defect and placing the expected incisions within the relaxed skin tension lines where possible. The area thus outlined was incised deep to adipose tissue with a #15 scalpel blade. The skin margins were undermined to an appropriate distance in all directions utilizing iris scissors. Following this, the designed flap was carried over into the primary defect and sutured into place.

## 2023-09-29 ENCOUNTER — VIRTUAL VISIT (OUTPATIENT)
Dept: PSYCHOLOGY | Facility: CLINIC | Age: 48
End: 2023-09-29
Payer: COMMERCIAL

## 2023-09-29 DIAGNOSIS — F90.0 ADHD (ATTENTION DEFICIT HYPERACTIVITY DISORDER), INATTENTIVE TYPE: Primary | ICD-10-CM

## 2023-09-29 DIAGNOSIS — F41.1 GAD (GENERALIZED ANXIETY DISORDER): ICD-10-CM

## 2023-09-29 PROCEDURE — 90837 PSYTX W PT 60 MINUTES: CPT | Mod: VID

## 2023-09-29 ASSESSMENT — ANXIETY QUESTIONNAIRES
3. WORRYING TOO MUCH ABOUT DIFFERENT THINGS: NOT AT ALL
2. NOT BEING ABLE TO STOP OR CONTROL WORRYING: NOT AT ALL
IF YOU CHECKED OFF ANY PROBLEMS ON THIS QUESTIONNAIRE, HOW DIFFICULT HAVE THESE PROBLEMS MADE IT FOR YOU TO DO YOUR WORK, TAKE CARE OF THINGS AT HOME, OR GET ALONG WITH OTHER PEOPLE: SOMEWHAT DIFFICULT
5. BEING SO RESTLESS THAT IT IS HARD TO SIT STILL: SEVERAL DAYS
GAD7 TOTAL SCORE: 7
4. TROUBLE RELAXING: NEARLY EVERY DAY
6. BECOMING EASILY ANNOYED OR IRRITABLE: MORE THAN HALF THE DAYS
1. FEELING NERVOUS, ANXIOUS, OR ON EDGE: SEVERAL DAYS
7. FEELING AFRAID AS IF SOMETHING AWFUL MIGHT HAPPEN: NOT AT ALL
GAD7 TOTAL SCORE: 7

## 2023-09-29 NOTE — PROGRESS NOTES
[facility]                                     Progress Note    Patient Name: Tressa Jeong  Date: 9/29/2023      Service Type: Individual      Session Start Time: 11:25 AM  Session End Time: 12:23 PM     Session Length: 53+ min    Session #: 6    Attendees: Client attended alone    Service Modality:  Video Visit:      Provider verified identity through the following two step process.  Patient provided:  Patient is known previously to provider    Telemedicine Visit: The patient's condition can be safely assessed and treated via synchronous audio and visual telemedicine encounter.      Reason for Telemedicine Visit: Patient has requested telehealth visit    Originating Site (Patient Location): Patient's home    Distant Site (Provider Location): Provider Remote Setting- Home Office    Consent:  The patient/guardian has verbally consented to: the potential risks and benefits of telemedicine (video visit) versus in person care; bill my insurance or make self-payment for services provided; and responsibility for payment of non-covered services.     Patient would like the video invitation sent by:  My Chart    Mode of Communication:  Video Conference via [facility]    Distant Location (Provider):  Off-site    As the provider I attest to compliance with applicable laws and regulations related to telemedicine.    DATA  Extended Session (53+ minutes): PROLONGED SERVICE IN THE OUTPATIENT SETTING REQUIRING DIRECT (FACE-TO-FACE) PATIENT CONTACT BEYOND THE USUAL SERVICE:    - Patient's presenting concerns require more intensive intervention than could be completed within the usual service  Interactive Complexity: No  Crisis: No        Progress Since Last Session (Related to Symptoms / Goals / Homework):   Symptoms: Worsening symptoms of anxiety based on LYUBOV-7 scores and patient self-report.     Homework: Achieved / completed to satisfaction      Episode of Care Goals: Satisfactory progress - CONTEMPLATION  (Considering change and yet undecided); Intervened by assessing the negative and positive thinking (ambivalence) about behavior change     Current / Ongoing Stressors and Concerns:   Patient reports she is concerned about ADHD symptoms. Patient reports she is concerned about motivation. Patient reports she is concerned about working. Patient reports she is concerned about task completion.       Treatment Objective(s) Addressed in This Session:   Patient will learn about the diagnosis and symptoms of ADHD.   Patient will practice setting goals and completing them.  Patient will learn 3 skills to help increase motivation and organization.   Patient will learn about how ADHD can impact social interactions and interpersonal relationships.     Intervention:   Motivational Interviewing: supported patient in processing and exploring concerns about motivation.   Validated patient's emotions. Supported patient in exploring how she was able to motivate herself to complete a task this week. Validated patient's use of skills. Discussed how the dopamine from accomplishing tasks is a motivator for her. Discussed setting reminders of this for herself. Discussed creating deadlines for tasks she would like to accomplish. Discussed putting these deadlines in her calendar and setting reminders for herself. Discussed being mindful of how effective these skills are to increase motivation and task completion.      Assessments completed prior to visit:  The following assessments were completed by patient for this visit:  PHQ9:       4/7/2016    11:17 AM 9/13/2022     8:51 AM 11/15/2022    11:10 AM 7/11/2023     3:49 PM   PHQ-9 SCORE   PHQ-9 Total Score MyChart  17 (Moderately severe depression)  4 (Minimal depression)   PHQ-9 Total Score 12 17 15 4     GAD7:       9/13/2022     3:40 PM 11/15/2022    11:10 AM 12/27/2022    11:02 AM 1/18/2023     3:49 PM 7/12/2023     3:00 PM 8/30/2023     1:00 PM 9/29/2023    11:00 AM   LYUBOV-7 SCORE   Total  Score 18 (severe anxiety)  14 (moderate anxiety) 7 (mild anxiety)      Total Score 18 12 14 7 6 5 7     PROMIS 10-Global Health (only subscores and total score):       9/13/2022     3:45 PM 11/15/2022    11:18 AM 7/11/2023     4:12 PM   PROMIS-10 Scores Only   Global Mental Health Score  10 11   Global Physical Health Score  15 16   PROMIS TOTAL - SUBSCORES  25 27       Information is confidential and restricted. Go to Review Flowsheets to unlock data.         ASSESSMENT: Current Emotional / Mental Status (status of significant symptoms):   Risk status (Self / Other harm or suicidal ideation)   Patient denies current fears or concerns for personal safety.   Patient denies current or recent suicidal ideation or behaviors.   Patient denies current or recent homicidal ideation or behaviors.   Patient denies current or recent self injurious behavior or ideation.   Patient denies other safety concerns.   Patient reports there has been no change in risk factors since their last session.     Patient reports there has been no change in protective factors since their last session.     Recommended that patient call 911 or go to the local ED should there be a change in any of these risk factors.     Appearance:   Appropriate    Eye Contact:   Good    Psychomotor Behavior: Normal    Attitude:   Cooperative    Orientation:   All   Speech    Rate / Production: Hyperverbal     Volume:  Normal    Mood:    Anxious    Affect:    Worrisome    Thought Content:  Rumination    Thought Form:  Logical    Insight:    Good      Medication Review:   No changes to current psychiatric medication(s)     Medication Compliance:   Yes     Changes in Health Issues:   None reported     Chemical Use Review:   Substance Use: Chemical use reviewed, no active concerns identified      Tobacco Use: No current tobacco use.      Diagnosis:  1. ADHD (attention deficit hyperactivity disorder), inattentive type    2. LYUBOV (generalized anxiety disorder)         Collateral Reports Completed:   Not Applicable    PLAN: (Patient Tasks / Therapist Tasks / Other)  Patient will create reminders on her phone for motivators to complete tasks. Patient will create deadlines for tasks in her calendar. Patient will be mindful of how effective this is.   We will discuss next session.         Aline Shabazz, [facility] 9/29/2023    ___________________________________________________________                                              Individual Treatment Plan    Patient's Name: Tressa Jeong  YOB: 1975    Date of Creation: 7/28/2023  Date Treatment Plan Last Reviewed/Revised: 7/28/2023    DSM5 Diagnoses: Attention-Deficit/Hyperactivity Disorder  314.00 (F90.0) Predominantly inattentive presentation or 300.02 (F41.1) Generalized Anxiety Disorder  Psychosocial / Contextual Factors: Patient is currently unemployed. Patient is living with her dad part time. Patient is single.   PROMIS (reviewed every 90 days):The following assessments were completed by patient for this visit:  PROMIS 10-Global Health (only subscores and total score):       9/13/2022     3:45 PM 11/15/2022    11:18 AM 7/11/2023     4:12 PM   PROMIS-10 Scores Only   Global Mental Health Score  10 11   Global Physical Health Score  15 16   PROMIS TOTAL - SUBSCORES  25 27       Information is confidential and restricted. Go to Review Flowsheets to unlock data.        Referral / Collaboration:  Referral to another professional/service is not indicated at this time.    Anticipated number of session for this episode of care:  50  Anticipation frequency of session: Every other week  Anticipated Duration of each session: 38-52 minutes  Treatment plan will be reviewed in 90 days or when goals have been changed.       MeasurableTreatment Goal(s) related to diagnosis / functional impairment(s)  Goal 1: Patient will work on stabilizing symptoms of ADHD.      Objective #A  Patient will learn about the diagnosis and  symptoms of ADHD.   Status: New - Date: 7/28/2023    Intervention(s)  Therapist will teach about the diagnosis of ADHD.     Objective #B  Patient will practice setting goals and completing them.                         Status: New - Date: 7/28/2023     Intervention(s)  Therapist will teach SMART goals and how to break tasks down into smaller tasks.     Objective #C  Patient will learn 3 skills to help increase motivation and organization.   Status: New - Date: 7/28/2023    Intervention(s)  Therapist will assign homework of using coping skills to increase motivation and organizational skills.    Objective #D  Patient will learn about how ADHD can impact social interactions and interpersonal relationships.  Status:New -Date: 7/28/2023    Intervention(s)  Therapist will teach about how ADHD can impact social interactions and interpersonal relationships.         Goal 2: Client will work on stabilizing anxiety symptoms as evidenced by LYUBOV-7 scores (current is 6) and Self report.  Goal is to reduce by five points.      I will know I've met my goal when I can better manage my anxiety .      Objective #A Client will identify triggers of anxiety and process them in session.    Status: New - Date: 7/28/2023    Intervention(s)  Therapist will teach emotional recognition/identification by assigning homework to journal with written exercises.    Objective #B  Client will identify initial signs or symptoms of anxiety.    Status: New - Date: 7/28/2023    Intervention(s)  Therapist will teach emotional regulation skills, such as deep breathing and mindfulness skills.    Objective #C  Client will learn about co-morbidities between ADHD and LYUBOV.   Status: New - Date: 7/28/2023    Intervention(s)  Therapist will provide educational materials on symptoms of ADHD and LYUBOV.          Patient has reviewed and agreed to the above plan.      Aline Shabazz, [facility]  July 28, 2023

## 2023-10-13 ENCOUNTER — VIRTUAL VISIT (OUTPATIENT)
Dept: PSYCHOLOGY | Facility: CLINIC | Age: 48
End: 2023-10-13
Payer: COMMERCIAL

## 2023-10-13 DIAGNOSIS — F41.1 GAD (GENERALIZED ANXIETY DISORDER): ICD-10-CM

## 2023-10-13 DIAGNOSIS — F90.0 ADHD (ATTENTION DEFICIT HYPERACTIVITY DISORDER), INATTENTIVE TYPE: Primary | ICD-10-CM

## 2023-10-13 PROCEDURE — 90837 PSYTX W PT 60 MINUTES: CPT | Mod: VID

## 2023-10-13 NOTE — PROGRESS NOTES
[facility]                                     Progress Note    Patient Name: Tressa Jeong  Date: 10/13/2023      Service Type: Individual      Session Start Time: 10:30 AM  Session End Time: 11:24 AM     Session Length: 53+ min    Session #: 7    Attendees: Client attended alone    Service Modality:  Video Visit:      Provider verified identity through the following two step process.  Patient provided:  Patient is known previously to provider    Telemedicine Visit: The patient's condition can be safely assessed and treated via synchronous audio and visual telemedicine encounter.      Reason for Telemedicine Visit: Patient has requested telehealth visit    Originating Site (Patient Location): Patient's home    Distant Site (Provider Location): Provider Remote Setting- Home Office    Consent:  The patient/guardian has verbally consented to: the potential risks and benefits of telemedicine (video visit) versus in person care; bill my insurance or make self-payment for services provided; and responsibility for payment of non-covered services.     Patient would like the video invitation sent by:  My Chart    Mode of Communication:  [facility]    Distant Location (Provider):  Off-site    As the provider I attest to compliance with applicable laws and regulations related to telemedicine.    DATA  Extended Session (53+ minutes): PROLONGED SERVICE IN THE OUTPATIENT SETTING REQUIRING DIRECT (FACE-TO-FACE) PATIENT CONTACT BEYOND THE USUAL SERVICE:    - Patient's presenting concerns require more intensive intervention than could be completed within the usual service  Interactive Complexity: No  Crisis: No        Progress Since Last Session (Related to Symptoms / Goals / Homework):   Symptoms: No change based on patient self-report.     Homework: Achieved / completed to satisfaction      Episode of Care Goals: Satisfactory progress - CONTEMPLATION (Considering change and yet undecided);  Intervened by assessing the negative and positive thinking (ambivalence) about behavior change     Current / Ongoing Stressors and Concerns:   Patient reports she is concerned about ADHD symptoms. Patient reports she is concerned about motivation. Patient reports she is concerned about working. Patient reports she is concerned about task completion.       Treatment Objective(s) Addressed in This Session:   Patient will learn about the diagnosis and symptoms of ADHD.   Patient will practice setting goals and completing them.  Patient will learn 3 skills to help increase motivation and organization.   Patient will learn about how ADHD can impact social interactions and interpersonal relationships.     Intervention:   Motivational Interviewing: supported patient in processing and exploring concerns about motivation.   Validated patient's concerns. Supported patient in processing and exploring what has motivated her in the past. Reviewed using reminders to help herself remember how it feels to complete tasks. Supported patient in setting reminders for herself. Supported patient in exploring patterns in her productivity. Discussed setting reasonable expectations for productivity based on these patterns.       Assessments completed prior to visit:  The following assessments were completed by patient for this visit:  PHQ9:       4/7/2016    11:17 AM 9/13/2022     8:51 AM 11/15/2022    11:10 AM 7/11/2023     3:49 PM   PHQ-9 SCORE   PHQ-9 Total Score MyChart  17 (Moderately severe depression)  4 (Minimal depression)   PHQ-9 Total Score 12 17 15 4     GAD7:       9/13/2022     3:40 PM 11/15/2022    11:10 AM 12/27/2022    11:02 AM 1/18/2023     3:49 PM 7/12/2023     3:00 PM 8/30/2023     1:00 PM 9/29/2023    11:00 AM   LYUBOV-7 SCORE   Total Score 18 (severe anxiety)  14 (moderate anxiety) 7 (mild anxiety)      Total Score 18 12 14 7 6 5 7     PROMIS 10-Global Health (only subscores and total score):       9/13/2022     3:45 PM  11/15/2022    11:18 AM 7/11/2023     4:12 PM 10/13/2023    12:54 AM   PROMIS-10 Scores Only   Global Mental Health Score  10 11 10   Global Physical Health Score  15 16 16   PROMIS TOTAL - SUBSCORES  25 27 26       Information is confidential and restricted. Go to Review Flowsheets to unlock data.         ASSESSMENT: Current Emotional / Mental Status (status of significant symptoms):   Risk status (Self / Other harm or suicidal ideation)   Patient denies current fears or concerns for personal safety.   Patient denies current or recent suicidal ideation or behaviors.   Patient denies current or recent homicidal ideation or behaviors.   Patient denies current or recent self injurious behavior or ideation.   Patient denies other safety concerns.   Patient reports there has been no change in risk factors since their last session.     Patient reports there has been no change in protective factors since their last session.     Recommended that patient call 911 or go to the local ED should there be a change in any of these risk factors.     Appearance:   Appropriate    Eye Contact:   Good    Psychomotor Behavior: Normal    Attitude:   Cooperative    Orientation:   All   Speech    Rate / Production: Hyperverbal     Volume:  Normal    Mood:    Anxious    Affect:    Worrisome    Thought Content:  Rumination    Thought Form:  Logical    Insight:    Good      Medication Review:   No changes to current psychiatric medication(s)     Medication Compliance:   Yes     Changes in Health Issues:   None reported     Chemical Use Review:   Substance Use: Chemical use reviewed, no active concerns identified      Tobacco Use: No current tobacco use.      Diagnosis:  1. ADHD (attention deficit hyperactivity disorder), inattentive type    2. LYUBOV (generalized anxiety disorder)        Collateral Reports Completed:   Not Applicable    PLAN: (Patient Tasks / Therapist Tasks / Other)  Patient will use her calendar reminders to help motivate  herself to initiate tasks. Patient will be mindful of whether this is effective.  We will discuss next session.         Aline Shabazz, LICSW 10/13/2023    ___________________________________________________________                                              Individual Treatment Plan    Patient's Name: Tressa Jeong  YOB: 1975    Date of Creation: 7/28/2023  Date Treatment Plan Last Reviewed/Revised: 7/28/2023    DSM5 Diagnoses: Attention-Deficit/Hyperactivity Disorder  314.00 (F90.0) Predominantly inattentive presentation or 300.02 (F41.1) Generalized Anxiety Disorder  Psychosocial / Contextual Factors: Patient is currently unemployed. Patient is living with her dad part time. Patient is single.   PROMIS (reviewed every 90 days):The following assessments were completed by patient for this visit:  PROMIS 10-Global Health (only subscores and total score):       9/13/2022     3:45 PM 11/15/2022    11:18 AM 7/11/2023     4:12 PM 10/13/2023    12:54 AM   PROMIS-10 Scores Only   Global Mental Health Score  10 11 10   Global Physical Health Score  15 16 16   PROMIS TOTAL - SUBSCORES  25 27 26       Information is confidential and restricted. Go to Review Flowsheets to unlock data.        Referral / Collaboration:  Referral to another professional/service is not indicated at this time.    Anticipated number of session for this episode of care:  50  Anticipation frequency of session: Every other week  Anticipated Duration of each session: 38-52 minutes  Treatment plan will be reviewed in 90 days or when goals have been changed.       MeasurableTreatment Goal(s) related to diagnosis / functional impairment(s)  Goal 1: Patient will work on stabilizing symptoms of ADHD.      Objective #A  Patient will learn about the diagnosis and symptoms of ADHD.   Status: New - Date: 7/28/2023    Intervention(s)  Therapist will teach about the diagnosis of ADHD.     Objective #B  Patient will practice setting goals and  completing them.                         Status: New - Date: 7/28/2023     Intervention(s)  Therapist will teach SMART goals and how to break tasks down into smaller tasks.     Objective #C  Patient will learn 3 skills to help increase motivation and organization.   Status: New - Date: 7/28/2023    Intervention(s)  Therapist will assign homework of using coping skills to increase motivation and organizational skills.    Objective #D  Patient will learn about how ADHD can impact social interactions and interpersonal relationships.  Status:New -Date: 7/28/2023    Intervention(s)  Therapist will teach about how ADHD can impact social interactions and interpersonal relationships.         Goal 2: Client will work on stabilizing anxiety symptoms as evidenced by LYUBOV-7 scores (current is 6) and Self report.  Goal is to reduce by five points.      I will know I've met my goal when I can better manage my anxiety .      Objective #A Client will identify triggers of anxiety and process them in session.    Status: New - Date: 7/28/2023    Intervention(s)  Therapist will teach emotional recognition/identification by assigning homework to journal with written exercises.    Objective #B  Client will identify initial signs or symptoms of anxiety.    Status: New - Date: 7/28/2023    Intervention(s)  Therapist will teach emotional regulation skills, such as deep breathing and mindfulness skills.    Objective #C  Client will learn about co-morbidities between ADHD and LYUBOV.   Status: New - Date: 7/28/2023    Intervention(s)  Therapist will provide educational materials on symptoms of ADHD and LYUBOV.          Patient has reviewed and agreed to the above plan.      Aline Shabazz, [facility]  July 28, 2023

## 2023-10-23 ENCOUNTER — TELEPHONE (OUTPATIENT)
Dept: PSYCHOLOGY | Facility: CLINIC | Age: 48
End: 2023-10-23
Payer: COMMERCIAL

## 2023-10-23 NOTE — TELEPHONE ENCOUNTER
Provider called patient to offer an open session on 11/9. Patient reported she would like to schedule this appointment. Provider scheduled this appointment.

## 2023-10-27 ENCOUNTER — VIRTUAL VISIT (OUTPATIENT)
Dept: PSYCHOLOGY | Facility: CLINIC | Age: 48
End: 2023-10-27
Payer: COMMERCIAL

## 2023-10-27 DIAGNOSIS — F90.0 ADHD (ATTENTION DEFICIT HYPERACTIVITY DISORDER), INATTENTIVE TYPE: Primary | ICD-10-CM

## 2023-10-27 DIAGNOSIS — F41.1 GAD (GENERALIZED ANXIETY DISORDER): ICD-10-CM

## 2023-10-27 PROCEDURE — 90837 PSYTX W PT 60 MINUTES: CPT | Mod: VID

## 2023-10-27 ASSESSMENT — COLUMBIA-SUICIDE SEVERITY RATING SCALE - C-SSRS
2. HAVE YOU ACTUALLY HAD ANY THOUGHTS OF KILLING YOURSELF?: NO
6. HAVE YOU EVER DONE ANYTHING, STARTED TO DO ANYTHING, OR PREPARED TO DO ANYTHING TO END YOUR LIFE?: NO
ATTEMPT SINCE LAST CONTACT: NO
TOTAL  NUMBER OF INTERRUPTED ATTEMPTS SINCE LAST CONTACT: NO
TOTAL  NUMBER OF ABORTED OR SELF INTERRUPTED ATTEMPTS SINCE LAST CONTACT: NO
SUICIDE, SINCE LAST CONTACT: NO
1. SINCE LAST CONTACT, HAVE YOU WISHED YOU WERE DEAD OR WISHED YOU COULD GO TO SLEEP AND NOT WAKE UP?: NO

## 2023-10-27 ASSESSMENT — ANXIETY QUESTIONNAIRES
2. NOT BEING ABLE TO STOP OR CONTROL WORRYING: SEVERAL DAYS
5. BEING SO RESTLESS THAT IT IS HARD TO SIT STILL: NOT AT ALL
4. TROUBLE RELAXING: NEARLY EVERY DAY
1. FEELING NERVOUS, ANXIOUS, OR ON EDGE: SEVERAL DAYS
6. BECOMING EASILY ANNOYED OR IRRITABLE: NEARLY EVERY DAY
GAD7 TOTAL SCORE: 9
GAD7 TOTAL SCORE: 9
7. FEELING AFRAID AS IF SOMETHING AWFUL MIGHT HAPPEN: NOT AT ALL
3. WORRYING TOO MUCH ABOUT DIFFERENT THINGS: SEVERAL DAYS
IF YOU CHECKED OFF ANY PROBLEMS ON THIS QUESTIONNAIRE, HOW DIFFICULT HAVE THESE PROBLEMS MADE IT FOR YOU TO DO YOUR WORK, TAKE CARE OF THINGS AT HOME, OR GET ALONG WITH OTHER PEOPLE: VERY DIFFICULT

## 2023-10-27 ASSESSMENT — PATIENT HEALTH QUESTIONNAIRE - PHQ9: SUM OF ALL RESPONSES TO PHQ QUESTIONS 1-9: 15

## 2023-10-27 NOTE — PROGRESS NOTES
[facility]                                     Progress Note    Patient Name: Tressa Jeong  Date: 10/27/2023      Service Type: Individual      Session Start Time: 11:24 AM  Session End Time: 12:24 PM     Session Length: 53+ min    Session #: 8    Attendees: Client attended alone    Service Modality:  Video Visit:      Provider verified identity through the following two step process.  Patient provided:  Patient is known previously to provider    Telemedicine Visit: The patient's condition can be safely assessed and treated via synchronous audio and visual telemedicine encounter.      Reason for Telemedicine Visit: Patient has requested telehealth visit    Originating Site (Patient Location): Patient's home    Distant Site (Provider Location): Provider Remote Setting- Home Office    Consent:  The patient/guardian has verbally consented to: the potential risks and benefits of telemedicine (video visit) versus in person care; bill my insurance or make self-payment for services provided; and responsibility for payment of non-covered services.     Patient would like the video invitation sent by:  My Chart    [facility] of [facility]:  [facility]    Distant Location (Provider):  Off-site    As the provider I attest to compliance with applicable laws and regulations related to telemedicine.    DATA  Extended Session (53+ minutes): PROLONGED SERVICE IN THE OUTPATIENT SETTING REQUIRING DIRECT (FACE-TO-FACE) PATIENT CONTACT BEYOND THE USUAL SERVICE:    - Patient's presenting concerns require more intensive intervention than could be completed within the usual service  Interactive Complexity: No  Crisis: No        Progress Since Last Session (Related to Symptoms / Goals / Homework):   Symptoms: Worsening based on PHQ-9 and LYUBOV-7 scores and patient self-report.    Homework: Achieved / completed to satisfaction      Episode of Care Goals: Satisfactory progress - CONTEMPLATION (Considering  change and yet undecided); Intervened by assessing the negative and positive thinking (ambivalence) about behavior change     Current / Ongoing Stressors and Concerns:   Patient reports she is concerned about ADHD symptoms. Patient reports she is concerned about motivation. Patient reports she is concerned about working. Patient reports she is concerned about task completion.  Patient reports she is concerned about prioritizing tasks.      Treatment Objective(s) Addressed in This Session:   Patient will learn about the diagnosis and symptoms of ADHD.   Patient will practice setting goals and completing them.  Patient will learn 3 skills to help increase motivation and organization.   Patient will learn about how ADHD can impact social interactions and interpersonal relationships.     Intervention:   Motivational Interviewing: supported patient in exploring concerns about prioritizing tasks.   Validated patient's emotions. Supported patient in identifying her emotions about approaching tasks. Discussed dual processing and how writing things down can help the brain process effectively. Supported patient in writing down her to-do tasks. Supported patient in breaking down large tasks into smaller, more manageable tasks. Supported patient in prioritizing these tasks with numbers. Discussed leaving this list out to continue to add to it and use it as a visual reminder to complete these tasks. Discussed continuing to add to this list of tasks to complete.   Reviewed treatment plan and goals with patient.        Assessments completed prior to visit:  The following assessments were completed by patient for this visit:  PHQ9:       4/7/2016    11:17 AM 9/13/2022     8:51 AM 11/15/2022    11:10 AM 7/11/2023     3:49 PM 10/27/2023    11:00 AM   PHQ-9 SCORE   PHQ-9 Total Score MyChart  17 (Moderately severe depression)  4 (Minimal depression)    PHQ-9 Total Score 12 17 15 4 15     GAD7:       11/15/2022    11:10 AM 12/27/2022     11:02 AM 1/18/2023     3:49 PM 7/12/2023     3:00 PM 8/30/2023     1:00 PM 9/29/2023    11:00 AM 10/27/2023    11:00 AM   LYUBOV-7 SCORE   Total Score  14 (moderate anxiety) 7 (mild anxiety)       Total Score 12 14 7 6 5 7 9     PROMIS 10-Global Health (only subscores and total score):       9/13/2022     3:45 PM 11/15/2022    11:18 AM 7/11/2023     4:12 PM 10/13/2023    12:54 AM 10/26/2023    10:36 PM   PROMIS-10 Scores Only   Global Mental Health Score  10 11 10 9   Global Physical Health Score  15 16 16 15   PROMIS TOTAL - SUBSCORES  25 27 26 24       Information is confidential and restricted. Go to Review Flowsheets to unlock data.         ASSESSMENT: Current Emotional / Mental Status (status of significant symptoms):   Risk status (Self / Other harm or suicidal ideation)   Patient denies current fears or concerns for personal safety.   Patient denies current or recent suicidal ideation or behaviors.   Patient denies current or recent homicidal ideation or behaviors.   Patient denies current or recent self injurious behavior or ideation.   Patient denies other safety concerns.   Patient reports there has been no change in risk factors since their last session.     Patient reports there has been no change in protective factors since their last session.     Recommended that patient call 911 or go to the local ED should there be a change in any of these risk factors.     Appearance:   Appropriate    Eye Contact:   Good    Psychomotor Behavior: Normal    Attitude:   Cooperative    Orientation:   All   Speech    Rate / Production: Hyperverbal     Volume:  Normal    Mood:    Anxious    Affect:    Worrisome    Thought Content:  Rumination    Thought Form:  Logical    Insight:    Good      Medication Review:   No changes to current psychiatric medication(s)     Medication Compliance:   Yes     Changes in Health Issues:   None reported     Chemical Use Review:   Substance Use: Chemical use reviewed, no active concerns  identified      Tobacco Use: No current tobacco use.      Diagnosis:  1. ADHD (attention deficit hyperactivity disorder), inattentive type    2. LYUBOV (generalized anxiety disorder)        Collateral Reports Completed:   Not Applicable    PLAN: (Patient Tasks / Therapist Tasks / Other)  Patient will continue to add to her list of tasks to complete and break down these tasks. Patient will approach one of her tasks on this list. Patient will  keep this list somewhere she can see it often. We will discuss next session.         Aline Shabazz, [facility] 10/27/2023    ___________________________________________________________                                              Individual Treatment Plan    Patient's Name: Tressa Jeong  YOB: 1975    Date of Creation: 7/28/2023  Date Treatment Plan Last Reviewed/Revised: 10/27/2023    DSM5 Diagnoses: Attention-Deficit/Hyperactivity Disorder  314.00 (F90.0) Predominantly inattentive presentation or 300.02 (F41.1) Generalized Anxiety Disorder  Psychosocial / Contextual Factors: Patient is currently unemployed. Patient is living with her dad part time. Patient is single.   PROMIS (reviewed every 90 days):The following assessments were completed by patient for this visit:  PROMIS 10-Global Health (only subscores and total score):       9/13/2022     3:45 PM 11/15/2022    11:18 AM 7/11/2023     4:12 PM 10/13/2023    12:54 AM 10/26/2023    10:36 PM   PROMIS-10 Scores Only   Global Mental Health Score  10 11 10 9   Global Physical Health Score  15 16 16 15   PROMIS TOTAL - SUBSCORES  25 27 26 24       Information is confidential and restricted. Go to Review Flowsheets to unlock data.        Referral / Collaboration:  Referral to another professional/service is not indicated at this time.    Anticipated number of session for this episode of care:  50  Anticipation frequency of session: Every other week  Anticipated Duration of each session: 38-52 minutes  Treatment plan will  be reviewed in 90 days or when goals have been changed.       MeasurableTreatment Goal(s) related to diagnosis / functional impairment(s)  Goal 1: Patient will work on stabilizing symptoms of ADHD.      Objective #A  Patient will learn about the diagnosis and symptoms of ADHD.   Status: Continued - Date(s): 10/27/2023    Intervention(s)  Therapist will teach about the diagnosis of ADHD.     Objective #B  Patient will practice setting goals and completing them.                         Status: Continued - Date(s): 10/27/2023     Intervention(s)  Therapist will teach SMART goals and how to break tasks down into smaller tasks.     Objective #C  Patient will learn 3 skills to help increase motivation and organization.   Status: Continued - Date(s): 10/27/2023    Intervention(s)  Therapist will assign homework of using coping skills to increase motivation and organizational skills.    Objective #D  Patient will learn about how ADHD can impact social interactions and interpersonal relationships.  Status:Continued - Date(s): 10/27/2023    Intervention(s)  Therapist will teach about how ADHD can impact social interactions and interpersonal relationships.         Goal 2: Client will work on stabilizing anxiety symptoms as evidenced by LYUBOV-7 scores (current is 6) and Self report.  Goal is to reduce by five points.      I will know I've met my goal when I can better manage my anxiety .      Objective #A Client will identify triggers of anxiety and process them in session.    Status: Continued - Date(s): 10/27/2023    Intervention(s)  Therapist will teach emotional recognition/identification by assigning homework to journal with written exercises.    Objective #B  Client will identify initial signs or symptoms of anxiety.    Status: Continued - Date(s): 10/27/2023    Intervention(s)  Therapist will teach emotional regulation skills, such as deep breathing and mindfulness skills.    Objective #C  Client will learn about  co-morbidities between ADHD and LYUBOV.   Status: Continued - Date(s): 10/27/2023    Intervention(s)  Therapist will provide educational materials on symptoms of ADHD and LYUBOV.          Patient has reviewed and agreed to the above plan.      Aline Shabazz, [facility]  July 28, 2023  Reviewed 10/27/2023

## 2023-11-09 ENCOUNTER — VIRTUAL VISIT (OUTPATIENT)
Dept: PSYCHOLOGY | Facility: CLINIC | Age: 48
End: 2023-11-09
Payer: COMMERCIAL

## 2023-11-09 DIAGNOSIS — F41.1 GAD (GENERALIZED ANXIETY DISORDER): ICD-10-CM

## 2023-11-09 DIAGNOSIS — F90.0 ADHD (ATTENTION DEFICIT HYPERACTIVITY DISORDER), INATTENTIVE TYPE: Primary | ICD-10-CM

## 2023-11-09 PROCEDURE — 90837 PSYTX W PT 60 MINUTES: CPT | Mod: VID

## 2023-11-09 NOTE — PROGRESS NOTES
[facility]                                     Progress Note    Patient Name: Tressa Jeong  Date: 11/9/2023      Service Type: Individual      Session Start Time: 9:25 AM  Session End Time: 10:20 AM     Session Length: 53+ min    Session #: 9    Attendees: Client attended alone    Service Modality:  Video Visit:      Provider verified identity through the following two step process.  Patient provided:  Patient is known previously to provider    Telemedicine Visit: The patient's condition can be safely assessed and treated via synchronous audio and visual telemedicine encounter.      Reason for Telemedicine Visit: Patient has requested telehealth visit    Originating Site (Patient Location): Patient's home    Distant Site (Provider Location): Provider Remote Setting- Home Office    Consent:  The patient/guardian has verbally consented to: the potential risks and benefits of telemedicine (video visit) versus in person care; bill my insurance or make self-payment for services provided; and responsibility for payment of non-covered services.     Patient would like the video invitation sent by:  My Chart    Mode of [facility]:  [facility]    Distant Location (Provider):  Off-site    As the provider I attest to compliance with applicable laws and regulations related to telemedicine.    DATA  Extended Session (53+ minutes): PROLONGED SERVICE IN THE OUTPATIENT SETTING REQUIRING DIRECT (FACE-TO-FACE) PATIENT CONTACT BEYOND THE USUAL SERVICE:    - Patient's presenting concerns require more intensive intervention than could be completed within the usual service  Interactive Complexity: No  Crisis: No        Progress Since Last Session (Related to Symptoms / Goals / Homework):   Symptoms: No change based on patient self-report.     Homework: Achieved / completed to satisfaction      Episode of Care Goals: Satisfactory progress - CONTEMPLATION (Considering change and yet undecided);  Intervened by assessing the negative and positive thinking (ambivalence) about behavior change     Current / Ongoing Stressors and Concerns:   Patient reports she is concerned about ADHD symptoms. Patient reports she is concerned about motivation. Patient reports she is concerned about working. Patient reports she is concerned about task completion.  Patient reports she is concerned about prioritizing tasks.      Treatment Objective(s) Addressed in This Session:   Patient will learn about the diagnosis and symptoms of ADHD.   Patient will practice setting goals and completing them.  Patient will learn 3 skills to help increase motivation and organization.   Patient will learn about how ADHD can impact social interactions and interpersonal relationships.     Intervention:   Motivational Interviewing: supported patient in processing and exploring concerns about motivation.   Validated patient's emotions. Supported patient in exploring what has helped to motivate her in the past. Supported patient in exploring how others help to motivate her. Discussed increased empathy for others as a symptom of ADHD. Discussed asking her friends to come sit while she completes tasks. Discussed being mindful of whether this is helpful. Supported patient in exploring concerns about sensory experiences. Validated patient's concerns. Discussed being more sensitive to sensory stimuli when neurodivergent. Discussed removing herself from the stimuli and being mindful of whether this is effective.       Assessments completed prior to visit:  The following assessments were completed by patient for this visit:  PHQ9:       4/7/2016    11:17 AM 9/13/2022     8:51 AM 11/15/2022    11:10 AM 7/11/2023     3:49 PM 10/27/2023    11:00 AM   PHQ-9 SCORE   PHQ-9 Total Score MyChart  17 (Moderately severe depression)  4 (Minimal depression)    PHQ-9 Total Score 12 17 15 4 15     GAD7:       11/15/2022    11:10 AM 12/27/2022    11:02 AM 1/18/2023      3:49 PM 7/12/2023     3:00 PM 8/30/2023     1:00 PM 9/29/2023    11:00 AM 10/27/2023    11:00 AM   LYUBOV-7 SCORE   Total Score  14 (moderate anxiety) 7 (mild anxiety)       Total Score 12 14 7 6 5 7 9     PROMIS 10-Global Health (only subscores and total score):       9/13/2022     3:45 PM 11/15/2022    11:18 AM 7/11/2023     4:12 PM 10/13/2023    12:54 AM 10/26/2023    10:36 PM   PROMIS-10 Scores Only   Global Mental Health Score  10 11 10 9   Global Physical Health Score  15 16 16 15   PROMIS TOTAL - SUBSCORES  25 27 26 24       Information is confidential and restricted. Go to Review Flowsheets to unlock data.         ASSESSMENT: Current Emotional / Mental Status (status of significant symptoms):   Risk status (Self / Other harm or suicidal ideation)   Patient denies current fears or concerns for personal safety.   Patient denies current or recent suicidal ideation or behaviors.   Patient denies current or recent homicidal ideation or behaviors.   Patient denies current or recent self injurious behavior or ideation.   Patient denies other safety concerns.   Patient reports there has been no change in risk factors since their last session.     Patient reports there has been no change in protective factors since their last session.     Recommended that patient call 911 or go to the local ED should there be a change in any of these risk factors.     Appearance:   Appropriate    Eye Contact:   Good    Psychomotor Behavior: Normal    Attitude:   Cooperative    Orientation:   All   Speech    Rate / Production: Hyperverbal     Volume:  Normal    Mood:    Anxious    Affect:    Worrisome    Thought Content:  Rumination    Thought Form:  Logical    Insight:    Good      Medication Review:   No changes to current psychiatric medication(s)     Medication Compliance:   Yes     Changes in Health Issues:   None reported     Chemical Use Review:   Substance Use: Chemical use reviewed, no active concerns identified      Tobacco  Use: No current tobacco use.      Diagnosis:  1. ADHD (attention deficit hyperactivity disorder), inattentive type    2. LYUBOV (generalized anxiety disorder)        Collateral Reports Completed:   Not Applicable    PLAN: (Patient Tasks / Therapist Tasks / Other)  Patient will remove herself from the stimuli when having a difficult sensory experience. Patient will ask her friends to come spend time with her as she completes tasks. Patient will be mindful of how effective these skills are.  We will discuss next session.         Aline Shabazz, [facility] 11/9/2023    ___________________________________________________________                                              Individual Treatment Plan    Patient's Name: Tressa Jeong  YOB: 1975    Date of Creation: 7/28/2023  Date Treatment Plan Last Reviewed/Revised: 10/27/2023    DSM5 Diagnoses: Attention-Deficit/Hyperactivity Disorder  314.00 (F90.0) Predominantly inattentive presentation or 300.02 (F41.1) Generalized Anxiety Disorder  Psychosocial / Contextual Factors: Patient is currently unemployed. Patient is living with her dad part time. Patient is single.   PROMIS (reviewed every 90 days):The following assessments were completed by patient for this visit:  PROMIS 10-Global Health (only subscores and total score):       9/13/2022     3:45 PM 11/15/2022    11:18 AM 7/11/2023     4:12 PM 10/13/2023    12:54 AM 10/26/2023    10:36 PM   PROMIS-10 Scores Only   Global Mental Health Score  10 11 10 9   Global Physical Health Score  15 16 16 15   PROMIS TOTAL - SUBSCORES  25 27 26 24       Information is confidential and restricted. Go to Review Flowsheets to unlock data.        Referral / Collaboration:  Referral to another professional/service is not indicated at this time.    Anticipated number of session for this episode of care:  50  Anticipation frequency of session: Every other week  Anticipated Duration of each session: 38-52 minutes  Treatment plan  will be reviewed in 90 days or when goals have been changed.       MeasurableTreatment Goal(s) related to diagnosis / functional impairment(s)  Goal 1: Patient will work on stabilizing symptoms of ADHD.      Objective #A  Patient will learn about the diagnosis and symptoms of ADHD.   Status: Continued - Date(s): 10/27/2023    Intervention(s)  Therapist will teach about the diagnosis of ADHD.     Objective #B  Patient will practice setting goals and completing them.                         Status: Continued - Date(s): 10/27/2023     Intervention(s)  Therapist will teach SMART goals and how to break tasks down into smaller tasks.     Objective #C  Patient will learn 3 skills to help increase motivation and organization.   Status: Continued - Date(s): 10/27/2023    Intervention(s)  Therapist will assign homework of using coping skills to increase motivation and organizational skills.    Objective #D  Patient will learn about how ADHD can impact social interactions and interpersonal relationships.  Status:Continued - Date(s): 10/27/2023    Intervention(s)  Therapist will teach about how ADHD can impact social interactions and interpersonal relationships.         Goal 2: Client will work on stabilizing anxiety symptoms as evidenced by LYUBOV-7 scores (current is 6) and Self report.  Goal is to reduce by five points.      I will know I've met my goal when I can better manage my anxiety .      Objective #A Client will identify triggers of anxiety and process them in session.    Status: Continued - Date(s): 10/27/2023    Intervention(s)  Therapist will teach emotional recognition/identification by assigning homework to journal with written exercises.    Objective #B  Client will identify initial signs or symptoms of anxiety.    Status: Continued - Date(s): 10/27/2023    Intervention(s)  Therapist will teach emotional regulation skills, such as deep breathing and mindfulness skills.    Objective #C  Client will learn about  co-morbidities between ADHD and LYUBOV.   Status: Continued - Date(s): 10/27/2023    Intervention(s)  Therapist will provide educational materials on symptoms of ADHD and LYUBOV.          Patient has reviewed and agreed to the above plan.      Aline Shabazz, [facility]  July 28, 2023  Reviewed 10/27/2023

## 2023-11-20 ENCOUNTER — VIRTUAL VISIT (OUTPATIENT)
Dept: PSYCHOLOGY | Facility: CLINIC | Age: 48
End: 2023-11-20
Payer: COMMERCIAL

## 2023-11-20 DIAGNOSIS — F41.1 GAD (GENERALIZED ANXIETY DISORDER): ICD-10-CM

## 2023-11-20 DIAGNOSIS — F90.0 ADHD (ATTENTION DEFICIT HYPERACTIVITY DISORDER), INATTENTIVE TYPE: Primary | ICD-10-CM

## 2023-11-20 PROCEDURE — 90837 PSYTX W PT 60 MINUTES: CPT | Mod: VID

## 2023-11-20 ASSESSMENT — ANXIETY QUESTIONNAIRES
5. BEING SO RESTLESS THAT IT IS HARD TO SIT STILL: NEARLY EVERY DAY
1. FEELING NERVOUS, ANXIOUS, OR ON EDGE: MORE THAN HALF THE DAYS
IF YOU CHECKED OFF ANY PROBLEMS ON THIS QUESTIONNAIRE, HOW DIFFICULT HAVE THESE PROBLEMS MADE IT FOR YOU TO DO YOUR WORK, TAKE CARE OF THINGS AT HOME, OR GET ALONG WITH OTHER PEOPLE: VERY DIFFICULT
6. BECOMING EASILY ANNOYED OR IRRITABLE: NEARLY EVERY DAY
GAD7 TOTAL SCORE: 12
7. FEELING AFRAID AS IF SOMETHING AWFUL MIGHT HAPPEN: NOT AT ALL
GAD7 TOTAL SCORE: 12
2. NOT BEING ABLE TO STOP OR CONTROL WORRYING: NOT AT ALL
3. WORRYING TOO MUCH ABOUT DIFFERENT THINGS: SEVERAL DAYS
4. TROUBLE RELAXING: NEARLY EVERY DAY

## 2023-11-20 NOTE — PROGRESS NOTES
[facility]                                     Progress Note    Patient Name: Tressa Jeong  Date: 11/20/2023      Service Type: Individual      Session Start Time: 2:27 PM  Session End Time: 3:25 PM     Session Length: 53+ min    Session #: 10    Attendees: Client attended alone    Service Modality:  Video Visit:      Provider verified identity through the following two step process.  Patient provided:  Patient is known previously to provider    Telemedicine Visit: The patient's condition can be safely assessed and treated via synchronous audio and visual telemedicine encounter.      Reason for Telemedicine Visit: Patient has requested telehealth visit    Originating Site (Patient Location): Patient's home    Distant Site (Provider Location): Provider Remote Setting- Home Office    Consent:  The patient/guardian has verbally consented to: the potential risks and benefits of telemedicine (video visit) versus in person care; bill my insurance or make self-payment for services provided; and responsibility for payment of non-covered services.     Patient would like the video invitation sent by:  My Chart    Mode of Communication:  Video Conference via [facility]    Distant Location (Provider):  Off-site    As the provider I attest to compliance with applicable laws and regulations related to telemedicine.    DATA  Extended Session (53+ minutes): PROLONGED SERVICE IN THE OUTPATIENT SETTING REQUIRING DIRECT (FACE-TO-FACE) PATIENT CONTACT BEYOND THE USUAL SERVICE:    - Patient's presenting concerns require more intensive intervention than could be completed within the usual service  Interactive Complexity: No  Crisis: No        Progress Since Last Session (Related to Symptoms / Goals / Homework):   Symptoms: Worsening symptoms of anxiety based on LYUBOV-7 scores and patient self-report.     Homework: Achieved / completed to satisfaction      Episode of Care Goals: Satisfactory progress - PREPARATION  (Decided to change - considering how); Intervened by negotiating a change plan and determining options / strategies for behavior change, identifying triggers, exploring social supports, and working towards setting a date to begin behavior change     Current / Ongoing Stressors and Concerns:   Patient reports she is concerned about ADHD symptoms. Patient reports she is concerned about motivation. Patient reports she is concerned about working. Patient reports she is concerned about task completion.  Patient reports she is concerned about prioritizing tasks. Patient reports her dad is coming home soon.      Treatment Objective(s) Addressed in This Session:   Patient will learn about the diagnosis and symptoms of ADHD.   Patient will practice setting goals and completing them.  Patient will learn 3 skills to help increase motivation and organization.   Patient will learn about how ADHD can impact social interactions and interpersonal relationships.     Intervention:   Motivational Interviewing: supported patient in processing and exploring how her dad coming home impacts symptoms of ADHD.   Validated patient's emotions. Supported patient in exploring judgments from others about her living environment. Supported patient in exploring how sometimes those judgments can be motivated. Reviewed breaking down large tasks into smaller, more manageable tasks. Discussed breaking down the large task of cleaning the house into 4 parts. Discussed addressing one part per week. Discussed being mindful of whether the pressures of others impact motivation levels.       Assessments completed prior to visit:  The following assessments were completed by patient for this visit:  PHQ9:       4/7/2016    11:17 AM 9/13/2022     8:51 AM 11/15/2022    11:10 AM 7/11/2023     3:49 PM 10/27/2023    11:00 AM   PHQ-9 SCORE   PHQ-9 Total Score MyChart  17 (Moderately severe depression)  4 (Minimal depression)    PHQ-9 Total Score 12 17 15 4 15      GAD7:       12/27/2022    11:02 AM 1/18/2023     3:49 PM 7/12/2023     3:00 PM 8/30/2023     1:00 PM 9/29/2023    11:00 AM 10/27/2023    11:00 AM 11/20/2023     2:00 PM   LYUBOV-7 SCORE   Total Score 14 (moderate anxiety) 7 (mild anxiety)        Total Score 14 7 6 5 7 9 12     PROMIS 10-Global Health (only subscores and total score):       9/13/2022     3:45 PM 11/15/2022    11:18 AM 7/11/2023     4:12 PM 10/13/2023    12:54 AM 10/26/2023    10:36 PM 11/20/2023     2:17 PM   PROMIS-10 Scores Only   Global Mental Health Score  10 11 10 9 9   Global Physical Health Score  15 16 16 15 16   PROMIS TOTAL - SUBSCORES  25 27 26 24 25       Information is confidential and restricted. Go to Review Flowsheets to unlock data.         ASSESSMENT: Current Emotional / Mental Status (status of significant symptoms):   Risk status (Self / Other harm or suicidal ideation)   Patient denies current fears or concerns for personal safety.   Patient denies current or recent suicidal ideation or behaviors.   Patient denies current or recent homicidal ideation or behaviors.   Patient denies current or recent self injurious behavior or ideation.   Patient denies other safety concerns.   Patient reports there has been no change in risk factors since their last session.     Patient reports there has been no change in protective factors since their last session.     Recommended that patient call 911 or go to the local ED should there be a change in any of these risk factors.     Appearance:   Appropriate    Eye Contact:   Good    Psychomotor Behavior: Normal    Attitude:   Cooperative    Orientation:   All   Speech    Rate / Production: Hyperverbal     Volume:  Normal    Mood:    Anxious    Affect:    Worrisome    Thought Content:  Rumination    Thought Form:  Logical    Insight:    Good      Medication Review:   No changes to current psychiatric medication(s)     Medication Compliance:   Yes     Changes in Health Issues:   None  reported     Chemical Use Review:   Substance Use: Chemical use reviewed, no active concerns identified      Tobacco Use: No current tobacco use.      Diagnosis:  1. ADHD (attention deficit hyperactivity disorder), inattentive type    2. LYUBOV (generalized anxiety disorder)        Collateral Reports Completed:   Not Applicable    PLAN: (Patient Tasks / Therapist Tasks / Other)  Patient will break down her large tasks into 4 smaller tasks. Patient will be mindful of how her dad coming home is a motivator.  We will discuss next session.         Aline Shabazz, [facility] 11/20/2023    ___________________________________________________________                                              Individual Treatment Plan    Patient's Name: Tressa Jeong  YOB: 1975    Date of Creation: 7/28/2023  Date Treatment Plan Last Reviewed/Revised: 10/27/2023    DSM5 Diagnoses: Attention-Deficit/Hyperactivity Disorder  314.00 (F90.0) Predominantly inattentive presentation or 300.02 (F41.1) Generalized Anxiety Disorder  Psychosocial / Contextual Factors: Patient is currently unemployed. Patient is living with her dad part time. Patient is single.   PROMIS (reviewed every 90 days):The following assessments were completed by patient for this visit:  PROMIS 10-Global Health (only subscores and total score):       9/13/2022     3:45 PM 11/15/2022    11:18 AM 7/11/2023     4:12 PM 10/13/2023    12:54 AM 10/26/2023    10:36 PM 11/20/2023     2:17 PM   PROMIS-10 Scores Only   Global Mental Health Score  10 11 10 9 9   Global Physical Health Score  15 16 16 15 16   PROMIS TOTAL - SUBSCORES  25 27 26 24 25       Information is confidential and restricted. Go to Review Flowsheets to unlock data.        Referral / Collaboration:  Referral to another professional/service is not indicated at this time.    Anticipated number of session for this episode of care:  50  Anticipation frequency of session: Every other week  Anticipated Duration  of each session: 38-52 minutes  Treatment plan will be reviewed in 90 days or when goals have been changed.       MeasurableTreatment Goal(s) related to diagnosis / functional impairment(s)  Goal 1: Patient will work on stabilizing symptoms of ADHD.      Objective #A  Patient will learn about the diagnosis and symptoms of ADHD.   Status: Continued - Date(s): 10/27/2023    Intervention(s)  Therapist will teach about the diagnosis of ADHD.     Objective #B  Patient will practice setting goals and completing them.                         Status: Continued - Date(s): 10/27/2023     Intervention(s)  Therapist will teach SMART goals and how to break tasks down into smaller tasks.     Objective #C  Patient will learn 3 skills to help increase motivation and organization.   Status: Continued - Date(s): 10/27/2023    Intervention(s)  Therapist will assign homework of using coping skills to increase motivation and organizational skills.    Objective #D  Patient will learn about how ADHD can impact social interactions and interpersonal relationships.  Status:Continued - Date(s): 10/27/2023    Intervention(s)  Therapist will teach about how ADHD can impact social interactions and interpersonal relationships.         Goal 2: Client will work on stabilizing anxiety symptoms as evidenced by LYUBOV-7 scores (current is 6) and Self report.  Goal is to reduce by five points.      I will know I've met my goal when I can better manage my anxiety .      Objective #A Client will identify triggers of anxiety and process them in session.    Status: Continued - Date(s): 10/27/2023    Intervention(s)  Therapist will teach emotional recognition/identification by assigning homework to journal with written exercises.    Objective #B  Client will identify initial signs or symptoms of anxiety.    Status: Continued - Date(s): 10/27/2023    Intervention(s)  Therapist will teach emotional regulation skills, such as deep breathing and mindfulness  skills.    Objective #C  Client will learn about co-morbidities between ADHD and LYUBOV.   Status: Continued - Date(s): 10/27/2023    Intervention(s)  Therapist will provide educational materials on symptoms of ADHD and LYUBOV.          Patient has reviewed and agreed to the above plan.      Aline Shabazz, [facility]  July 28, 2023  Reviewed 10/27/2023

## 2023-12-08 ENCOUNTER — VIRTUAL VISIT (OUTPATIENT)
Dept: PSYCHOLOGY | Facility: CLINIC | Age: 48
End: 2023-12-08
Payer: COMMERCIAL

## 2023-12-08 DIAGNOSIS — F90.0 ADHD (ATTENTION DEFICIT HYPERACTIVITY DISORDER), INATTENTIVE TYPE: Primary | ICD-10-CM

## 2023-12-08 DIAGNOSIS — F41.1 GAD (GENERALIZED ANXIETY DISORDER): ICD-10-CM

## 2023-12-08 PROCEDURE — 90837 PSYTX W PT 60 MINUTES: CPT | Mod: VID

## 2023-12-08 ASSESSMENT — PATIENT HEALTH QUESTIONNAIRE - PHQ9
SUM OF ALL RESPONSES TO PHQ QUESTIONS 1-9: 10
10. IF YOU CHECKED OFF ANY PROBLEMS, HOW DIFFICULT HAVE THESE PROBLEMS MADE IT FOR YOU TO DO YOUR WORK, TAKE CARE OF THINGS AT HOME, OR GET ALONG WITH OTHER PEOPLE: VERY DIFFICULT
SUM OF ALL RESPONSES TO PHQ QUESTIONS 1-9: 10

## 2023-12-08 NOTE — PROGRESS NOTES
[facility]                                     Progress Note    Patient Name: Tressa Jeong  Date: 12/8/2023      Service Type: Individual      Session Start Time: 11:30 AM  Session End Time: 12:24 PM     Session Length: 53+ min    Session #: 11    Attendees: Client attended alone    Service Modality:  Video Visit:      Provider verified identity through the following two step process.  Patient provided:  Patient is known previously to provider    Telemedicine Visit: The patient's condition can be safely assessed and treated via synchronous audio and visual telemedicine encounter.      Reason for Telemedicine Visit: Patient has requested telehealth visit    Originating Site (Patient Location): Patient's home    Distant Site (Provider Location): Provider Remote Setting- Home Office    Consent:  The patient/guardian has verbally consented to: the potential risks and benefits of telemedicine (video visit) versus in person care; bill my insurance or make self-payment for services provided; and responsibility for payment of non-covered services.     Patient would like the video invitation sent by:  My Chart    Mode of Communication:  Video Conference via [facility]    Distant Location (Provider):  Off-site    As the provider I attest to compliance with applicable laws and regulations related to telemedicine.    DATA  Extended Session (53+ minutes): PROLONGED SERVICE IN THE OUTPATIENT SETTING REQUIRING DIRECT (FACE-TO-FACE) PATIENT CONTACT BEYOND THE USUAL SERVICE:    - Patient's presenting concerns require more intensive intervention than could be completed within the usual service  Interactive Complexity: No  Crisis: No        Progress Since Last Session (Related to Symptoms / Goals / Homework):   Symptoms: Improving based on PHQ-9 score and patient self-report.    Homework: Achieved / completed to satisfaction      Episode of Care Goals: Satisfactory progress - PREPARATION (Decided to change -    59741/1     Victor Manuel Koo MRN: [id number]  AGE: 55 Vince Ellsworth MD  PCP: Vince Ellsworth MD  IP Consult Orders (From admission, onward)     Start     Ordered    07/10/23 2256  Inpatient consult to Infectious Diseases  ONE TIME        Provider:  Oleg Disla MD    07/10/23 2258                    bedside report from Lani MALDONADO. Pt resting considering how); Intervened by negotiating a change plan and determining options / strategies for behavior change, identifying triggers, exploring social supports, and working towards setting a date to begin behavior change     Current / Ongoing Stressors and Concerns:   Patient reports she is concerned about ADHD symptoms. Patient reports she is concerned about motivation. Patient reports she is concerned about working. Patient reports she is concerned about task completion.  Patient reports she is concerned about prioritizing tasks. Patient reports her dad is coming home soon.      Treatment Objective(s) Addressed in This Session:   Patient will learn about the diagnosis and symptoms of ADHD.   Patient will practice setting goals and completing them.  Patient will learn 3 skills to help increase motivation and organization.   Patient will learn about how ADHD can impact social interactions and interpersonal relationships.     Intervention:   Motivational Interviewing: supported patient in processing and exploring concerns about motivation and activation.   Validated patient's concerns. Supported patient in exploring her concerns about completing tasks before her dad returns home in a few weeks. Discussed structuring her day in a similar way to a work day. Discussed not engaging with electronic devices before completing tasks. Discussed using these devices as rewards. Supported patient in exploring feeling overwhelmed from paperwork. Discussed doing paperwork last to reduce the feeling of being overwhelmed.       Assessments completed prior to visit:  The following assessments were completed by patient for this visit:  PHQ9:       4/7/2016    11:17 AM 9/13/2022     8:51 AM 11/15/2022    11:10 AM 7/11/2023     3:49 PM 10/27/2023    11:00 AM 12/8/2023    10:45 AM   PHQ-9 SCORE   PHQ-9 Total Score MyChart  17 (Moderately severe depression)  4 (Minimal depression)  10 (Moderate depression)   PHQ-9 Total Score 12 17 15  4 15 10     GAD7:       12/27/2022    11:02 AM 1/18/2023     3:49 PM 7/12/2023     3:00 PM 8/30/2023     1:00 PM 9/29/2023    11:00 AM 10/27/2023    11:00 AM 11/20/2023     2:00 PM   LYUBOV-7 SCORE   Total Score 14 (moderate anxiety) 7 (mild anxiety)        Total Score 14 7 6 5 7 9 12     PROMIS 10-Global Health (only subscores and total score):       9/13/2022     3:45 PM 11/15/2022    11:18 AM 7/11/2023     4:12 PM 10/13/2023    12:54 AM 10/26/2023    10:36 PM 11/20/2023     2:17 PM   PROMIS-10 Scores Only   Global Mental Health Score  10 11 10 9 9   Global Physical Health Score  15 16 16 15 16   PROMIS TOTAL - SUBSCORES  25 27 26 24 25       Information is confidential and restricted. Go to Review Flowsheets to unlock data.         ASSESSMENT: Current Emotional / Mental Status (status of significant symptoms):   Risk status (Self / Other harm or suicidal ideation)   Patient denies current fears or concerns for personal safety.   Patient denies current or recent suicidal ideation or behaviors.   Patient denies current or recent homicidal ideation or behaviors.   Patient denies current or recent self injurious behavior or ideation.   Patient denies other safety concerns.   Patient reports there has been no change in risk factors since their last session.     Patient reports there has been no change in protective factors since their last session.     Recommended that patient call 911 or go to the local ED should there be a change in any of these risk factors.     Appearance:   Appropriate    Eye Contact:   Good    Psychomotor Behavior: Normal    Attitude:   Cooperative    Orientation:   All   Speech    Rate / Production: Hyperverbal     Volume:  Normal    Mood:    Anxious    Affect:    Worrisome    Thought Content:  Rumination    Thought Form:  Logical    Insight:    Good      Medication Review:   No changes to current psychiatric medication(s)     Medication Compliance:   Yes     Changes in Health Issues:   None  reported     Chemical Use Review:   Substance Use: Chemical use reviewed, no active concerns identified      Tobacco Use: No current tobacco use.      Diagnosis:  1. ADHD (attention deficit hyperactivity disorder), inattentive type    2. LYUBOV (generalized anxiety disorder)        Collateral Reports Completed:   Not Applicable    PLAN: (Patient Tasks / Therapist Tasks / Other)  Patient will practice structuring her days similarly to a work day. Patient will practice not engaging with her electronic devices before engaging in tasks. Patient will practice doing paperwork last to reduce the feeling of being overwhelmed.  We will discuss next session.         Aline Shabazz, [facility] 12/8/2023    ___________________________________________________________                                              Individual Treatment Plan    Patient's Name: Tressa Jeong  YOB: 1975    Date of Creation: 7/28/2023  Date Treatment Plan Last Reviewed/Revised: 10/27/2023    DSM5 Diagnoses: Attention-Deficit/Hyperactivity Disorder  314.00 (F90.0) Predominantly inattentive presentation or 300.02 (F41.1) Generalized Anxiety Disorder  Psychosocial / Contextual Factors: Patient is currently unemployed. Patient is living with her dad part time. Patient is single.   PROMIS (reviewed every 90 days):The following assessments were completed by patient for this visit:  PROMIS 10-Global Health (only subscores and total score):       9/13/2022     3:45 PM 11/15/2022    11:18 AM 7/11/2023     4:12 PM 10/13/2023    12:54 AM 10/26/2023    10:36 PM 11/20/2023     2:17 PM   PROMIS-10 Scores Only   Global Mental Health Score  10 11 10 9 9   Global Physical Health Score  15 16 16 15 16   PROMIS TOTAL - SUBSCORES  25 27 26 24 25       Information is confidential and restricted. Go to Review Flowsheets to unlock data.        Referral / Collaboration:  Referral to another professional/service is not indicated at this time.    Anticipated number of  session for this episode of care:  50  Anticipation frequency of session: Every other week  Anticipated Duration of each session: 38-52 minutes  Treatment plan will be reviewed in 90 days or when goals have been changed.       MeasurableTreatment Goal(s) related to diagnosis / functional impairment(s)  Goal 1: Patient will work on stabilizing symptoms of ADHD.      Objective #A  Patient will learn about the diagnosis and symptoms of ADHD.   Status: Continued - Date(s): 10/27/2023    Intervention(s)  Therapist will teach about the diagnosis of ADHD.     Objective #B  Patient will practice setting goals and completing them.                         Status: Continued - Date(s): 10/27/2023     Intervention(s)  Therapist will teach SMART goals and how to break tasks down into smaller tasks.     Objective #C  Patient will learn 3 skills to help increase motivation and organization.   Status: Continued - Date(s): 10/27/2023    Intervention(s)  Therapist will assign homework of using coping skills to increase motivation and organizational skills.    Objective #D  Patient will learn about how ADHD can impact social interactions and interpersonal relationships.  Status:Continued - Date(s): 10/27/2023    Intervention(s)  Therapist will teach about how ADHD can impact social interactions and interpersonal relationships.         Goal 2: Client will work on stabilizing anxiety symptoms as evidenced by LYUBOV-7 scores (current is 6) and Self report.  Goal is to reduce by five points.      I will know I've met my goal when I can better manage my anxiety .      Objective #A Client will identify triggers of anxiety and process them in session.    Status: Continued - Date(s): 10/27/2023    Intervention(s)  Therapist will teach emotional recognition/identification by assigning homework to journal with written exercises.    Objective #B  Client will identify initial signs or symptoms of anxiety.    Status: Continued - Date(s):  10/27/2023    Intervention(s)  Therapist will teach emotional regulation skills, such as deep breathing and mindfulness skills.    Objective #C  Client will learn about co-morbidities between ADHD and LYUBOV.   Status: Continued - Date(s): 10/27/2023    Intervention(s)  Therapist will provide educational materials on symptoms of ADHD and LYUBVO.          Patient has reviewed and agreed to the above plan.      Aline Shabazz, [facility]  July 28, 2023  Reviewed 10/27/2023

## 2023-12-22 ENCOUNTER — VIRTUAL VISIT (OUTPATIENT)
Dept: PSYCHOLOGY | Facility: CLINIC | Age: 48
End: 2023-12-22
Payer: COMMERCIAL

## 2023-12-22 DIAGNOSIS — F90.0 ADHD (ATTENTION DEFICIT HYPERACTIVITY DISORDER), INATTENTIVE TYPE: Primary | ICD-10-CM

## 2023-12-22 DIAGNOSIS — F41.1 GAD (GENERALIZED ANXIETY DISORDER): ICD-10-CM

## 2023-12-22 PROCEDURE — 90837 PSYTX W PT 60 MINUTES: CPT | Mod: VID

## 2023-12-22 NOTE — PROGRESS NOTES
[facility]                                     Progress Note    Patient Name: Tressa Jeong  Date: 12/22/2023      Service Type: Individual      Session Start Time: 11:30 AM  Session End Time: 12:26 PM     Session Length: 53+ min    Session #: 12    Attendees: Client attended alone    Service Modality:  Video Visit:      Provider verified identity through the following two step process.  Patient provided:  Patient is known previously to provider    Telemedicine Visit: The patient's condition can be safely assessed and treated via synchronous audio and visual telemedicine encounter.      Reason for Telemedicine Visit: Patient has requested telehealth visit    Originating Site (Patient Location): Patient's home    Distant Site (Provider Location): Provider Remote Setting- Home Office    Consent:  The patient/guardian has verbally consented to: the potential risks and benefits of telemedicine (video visit) versus in person care; bill my insurance or make self-payment for services provided; and responsibility for payment of non-covered services.     Patient would like the video invitation sent by:  My Chart    Mode of Communication:  [facility]    Distant Location (Provider):  Off-site    As the provider I attest to compliance with applicable laws and regulations related to telemedicine.    DATA  Extended Session (53+ minutes): PROLONGED SERVICE IN THE OUTPATIENT SETTING REQUIRING DIRECT (FACE-TO-FACE) PATIENT CONTACT BEYOND THE USUAL SERVICE:    - Patient's presenting concerns require more intensive intervention than could be completed within the usual service  Interactive Complexity: No  Crisis: No        Progress Since Last Session (Related to Symptoms / Goals / Homework):   Symptoms: No change based on patient self-report.     Homework: Achieved / completed to satisfaction      Episode of Care Goals: Satisfactory progress - ACTION (Actively working towards change); Intervened  by reinforcing change plan / affirming steps taken     Current / Ongoing Stressors and Concerns:   Patient reports she is concerned about ADHD symptoms. Patient reports she is concerned about motivation. Patient reports she is concerned about working. Patient reports she is concerned about task completion.  Patient reports she is concerned about prioritizing tasks. Patient reports her dad is coming home for the winter.       Treatment Objective(s) Addressed in This Session:   Patient will learn about the diagnosis and symptoms of ADHD.   Patient will practice setting goals and completing them.  Patient will learn 3 skills to help increase motivation and organization.   Patient will learn about how ADHD can impact social interactions and interpersonal relationships.     Intervention:   Motivational Interviewing: supported patient in processing and exploring how deadlines motivate her.   Validated patient's emotions. Supported patient in exploring how accountability from outside sources is helpful to increase motivation. Discussed timing herself as she does tasks so that she increases her awareness of how long tasks take to complete. Supported patient in exploring concerns about her dad coming home today. Validated patient's emotions.       Assessments completed prior to visit:  The following assessments were completed by patient for this visit:  PHQ9:       4/7/2016    11:17 AM 9/13/2022     8:51 AM 11/15/2022    11:10 AM 7/11/2023     3:49 PM 10/27/2023    11:00 AM 12/8/2023    10:45 AM   PHQ-9 SCORE   PHQ-9 Total Score MyChart  17 (Moderately severe depression)  4 (Minimal depression)  10 (Moderate depression)   PHQ-9 Total Score 12 17 15 4 15 10     GAD7:       12/27/2022    11:02 AM 1/18/2023     3:49 PM 7/12/2023     3:00 PM 8/30/2023     1:00 PM 9/29/2023    11:00 AM 10/27/2023    11:00 AM 11/20/2023     2:00 PM   LYUBOV-7 SCORE   Total Score 14 (moderate anxiety) 7 (mild anxiety)        Total Score 14 7 6 5 7 9  12     PROMIS 10-Global Health (only subscores and total score):       9/13/2022     3:45 PM 11/15/2022    11:18 AM 7/11/2023     4:12 PM 10/13/2023    12:54 AM 10/26/2023    10:36 PM 11/20/2023     2:17 PM   PROMIS-10 Scores Only   Global Mental Health Score  10 11 10 9 9   Global Physical Health Score  15 16 16 15 16   PROMIS TOTAL - SUBSCORES  25 27 26 24 25       Information is confidential and restricted. Go to Review Flowsheets to unlock data.         ASSESSMENT: Current Emotional / Mental Status (status of significant symptoms):   Risk status (Self / Other harm or suicidal ideation)   Patient denies current fears or concerns for personal safety.   Patient denies current or recent suicidal ideation or behaviors.   Patient denies current or recent homicidal ideation or behaviors.   Patient denies current or recent self injurious behavior or ideation.   Patient denies other safety concerns.   Patient reports there has been no change in risk factors since their last session.     Patient reports there has been no change in protective factors since their last session.     Recommended that patient call 911 or go to the local ED should there be a change in any of these risk factors.     Appearance:   Appropriate    Eye Contact:   Good    Psychomotor Behavior: Normal    Attitude:   Cooperative    Orientation:   All   Speech    Rate / Production: Hyperverbal     Volume:  Normal    Mood:    Anxious    Affect:    Worrisome    Thought Content:  Rumination    Thought Form:  Logical    Insight:    Good      Medication Review:   No changes to current psychiatric medication(s)     Medication Compliance:   Yes     Changes in Health Issues:   None reported     Chemical Use Review:   Substance Use: Chemical use reviewed, no active concerns identified      Tobacco Use: No current tobacco use.      Diagnosis:  1. ADHD (attention deficit hyperactivity disorder), inattentive type    2. LYUBOV (generalized anxiety disorder)         Collateral Reports Completed:   Not Applicable    PLAN: (Patient Tasks / Therapist Tasks / Other)  Patient will time herself completing tasks to help bring awareness to the amount of time these tasks take her to complete. We will discuss next session.         Aline Shabazz, [facility] 12/22/2023    ___________________________________________________________                                              Individual Treatment Plan    Patient's Name: Tressa Jeong  YOB: 1975    Date of Creation: 7/28/2023  Date Treatment Plan Last Reviewed/Revised: 10/27/2023    DSM5 Diagnoses: Attention-Deficit/Hyperactivity Disorder  314.00 (F90.0) Predominantly inattentive presentation or 300.02 (F41.1) Generalized Anxiety Disorder  Psychosocial / Contextual Factors: Patient is currently unemployed. Patient is living with her dad part time. Patient is single.   PROMIS (reviewed every 90 days):The following assessments were completed by patient for this visit:  PROMIS 10-Global Health (only subscores and total score):       9/13/2022     3:45 PM 11/15/2022    11:18 AM 7/11/2023     4:12 PM 10/13/2023    12:54 AM 10/26/2023    10:36 PM 11/20/2023     2:17 PM   PROMIS-10 Scores Only   Global Mental Health Score  10 11 10 9 9   Global Physical Health Score  15 16 16 15 16   PROMIS TOTAL - SUBSCORES  25 27 26 24 25       Information is confidential and restricted. Go to Review Flowsheets to unlock data.        Referral / Collaboration:  Referral to another professional/service is not indicated at this time.    Anticipated number of session for this episode of care:  50  Anticipation frequency of session: Every other week  Anticipated Duration of each session: 38-52 minutes  Treatment plan will be reviewed in 90 days or when goals have been changed.       MeasurableTreatment Goal(s) related to diagnosis / functional impairment(s)  Goal 1: Patient will work on stabilizing symptoms of ADHD.      Objective #A  Patient will  learn about the diagnosis and symptoms of ADHD.   Status: Continued - Date(s): 10/27/2023    Intervention(s)  Therapist will teach about the diagnosis of ADHD.     Objective #B  Patient will practice setting goals and completing them.                         Status: Continued - Date(s): 10/27/2023     Intervention(s)  Therapist will teach SMART goals and how to break tasks down into smaller tasks.     Objective #C  Patient will learn 3 skills to help increase motivation and organization.   Status: Continued - Date(s): 10/27/2023    Intervention(s)  Therapist will assign homework of using coping skills to increase motivation and organizational skills.    Objective #D  Patient will learn about how ADHD can impact social interactions and interpersonal relationships.  Status:Continued - Date(s): 10/27/2023    Intervention(s)  Therapist will teach about how ADHD can impact social interactions and interpersonal relationships.         Goal 2: Client will work on stabilizing anxiety symptoms as evidenced by LYUBOV-7 scores (current is 6) and Self report.  Goal is to reduce by five points.      I will know I've met my goal when I can better manage my anxiety .      Objective #A Client will identify triggers of anxiety and process them in session.    Status: Continued - Date(s): 10/27/2023    Intervention(s)  Therapist will teach emotional recognition/identification by assigning homework to journal with written exercises.    Objective #B  Client will identify initial signs or symptoms of anxiety.    Status: Continued - Date(s): 10/27/2023    Intervention(s)  Therapist will teach emotional regulation skills, such as deep breathing and mindfulness skills.    Objective #C  Client will learn about co-morbidities between ADHD and LYUBOV.   Status: Continued - Date(s): 10/27/2023    Intervention(s)  Therapist will provide educational materials on symptoms of ADHD and LYUBOV.          Patient has reviewed and agreed to the above  plan.      Aline Shabazz, [facility]  July 28, 2023  Reviewed 10/27/2023

## 2024-01-05 ENCOUNTER — VIRTUAL VISIT (OUTPATIENT)
Dept: PSYCHOLOGY | Facility: CLINIC | Age: 49
End: 2024-01-05
Payer: COMMERCIAL

## 2024-01-05 DIAGNOSIS — F90.0 ADHD (ATTENTION DEFICIT HYPERACTIVITY DISORDER), INATTENTIVE TYPE: Primary | ICD-10-CM

## 2024-01-05 DIAGNOSIS — F41.1 GAD (GENERALIZED ANXIETY DISORDER): ICD-10-CM

## 2024-01-05 PROCEDURE — 90837 PSYTX W PT 60 MINUTES: CPT | Mod: 95

## 2024-01-05 ASSESSMENT — ANXIETY QUESTIONNAIRES
6. BECOMING EASILY ANNOYED OR IRRITABLE: SEVERAL DAYS
GAD7 TOTAL SCORE: 9
3. WORRYING TOO MUCH ABOUT DIFFERENT THINGS: SEVERAL DAYS
2. NOT BEING ABLE TO STOP OR CONTROL WORRYING: NOT AT ALL
4. TROUBLE RELAXING: NEARLY EVERY DAY
IF YOU CHECKED OFF ANY PROBLEMS ON THIS QUESTIONNAIRE, HOW DIFFICULT HAVE THESE PROBLEMS MADE IT FOR YOU TO DO YOUR WORK, TAKE CARE OF THINGS AT HOME, OR GET ALONG WITH OTHER PEOPLE: SOMEWHAT DIFFICULT
5. BEING SO RESTLESS THAT IT IS HARD TO SIT STILL: NEARLY EVERY DAY
7. FEELING AFRAID AS IF SOMETHING AWFUL MIGHT HAPPEN: NOT AT ALL
GAD7 TOTAL SCORE: 9
1. FEELING NERVOUS, ANXIOUS, OR ON EDGE: SEVERAL DAYS

## 2024-01-05 NOTE — PROGRESS NOTES
[facility]                                     Progress Note    Patient Name: Tressa Jeong  Date: 1/5/2024      Service Type: Individual      Session Start Time: 11:31 AM  Session End Time: 12:26 PM     Session Length: 53+ min    Session #: 13    Attendees: Client attended alone    Service Modality:  Video Visit:      Provider verified identity through the following two step process.  Patient provided:  Patient is known previously to provider    Telemedicine Visit: The patient's condition can be safely assessed and treated via synchronous audio and visual telemedicine encounter.      Reason for Telemedicine Visit: Patient has requested telehealth visit    Originating Site (Patient Location): Patient's home    Distant Site (Provider Location): Provider Remote Setting- Home Office    Consent:  The patient/guardian has verbally consented to: the potential risks and benefits of telemedicine (video visit) versus in person care; bill my insurance or make self-payment for services provided; and responsibility for payment of non-covered services.     Patient would like the video invitation sent by:  My Chart    Mode of [facility]:  [facility]    Distant Location (Provider):  Off-site    As the provider I attest to compliance with applicable laws and regulations related to telemedicine.    DATA  Extended Session (53+ minutes): PROLONGED SERVICE IN THE OUTPATIENT SETTING REQUIRING DIRECT (FACE-TO-FACE) PATIENT CONTACT BEYOND THE USUAL SERVICE:    - Patient's presenting concerns require more intensive intervention than could be completed within the usual service  Interactive Complexity: No  Crisis: No        Progress Since Last Session (Related to Symptoms / Goals / Homework):   Symptoms: Improving symptoms of anxiety based on LYUBOV-7 scores and patient self-report.     Homework: Achieved / completed to satisfaction      Episode of Care Goals: Satisfactory progress - ACTION (Actively  working towards change); Intervened by reinforcing change plan / affirming steps taken     Current / Ongoing Stressors and Concerns:   Patient reports she is concerned about ADHD symptoms. Patient reports she is concerned about motivation. Patient reports she is concerned about working. Patient reports she is concerned about task completion.  Patient reports she is concerned about prioritizing tasks. Patient reports her dad is home for the winter.      Treatment Objective(s) Addressed in This Session:   Patient will learn about the diagnosis and symptoms of ADHD.   Patient will practice setting goals and completing them.  Patient will learn 3 skills to help increase motivation and organization.   Patient will learn about how ADHD can impact social interactions and interpersonal relationships.     Intervention:   Motivational Interviewing: supported patient in processing and exploring how her dad coming home was a deadline to work towards.   Validated patient's emotions. Supported patient in processing and exploring ways that she can hold herself accountable for the goals she sets for herself. Supported patient in exploring ways to set deadlines for herself. Supported patient in exploring what has worked in the past to increase motivation. Discussed identifying ways to hold herself accountable.       Assessments completed prior to visit:  The following assessments were completed by patient for this visit:  PHQ9:       4/7/2016    11:17 AM 9/13/2022     8:51 AM 11/15/2022    11:10 AM 7/11/2023     3:49 PM 10/27/2023    11:00 AM 12/8/2023    10:45 AM   PHQ-9 SCORE   PHQ-9 Total Score MyChart  17 (Moderately severe depression)  4 (Minimal depression)  10 (Moderate depression)   PHQ-9 Total Score 12 17 15 4 15 10     GAD7:       1/18/2023     3:49 PM 7/12/2023     3:00 PM 8/30/2023     1:00 PM 9/29/2023    11:00 AM 10/27/2023    11:00 AM 11/20/2023     2:00 PM 1/5/2024    11:00 AM   LYUBOV-7 SCORE   Total Score 7 (mild  anxiety)         Total Score 7 6 5 7 9 12 9     PROMIS 10-Global Health (only subscores and total score):       9/13/2022     3:45 PM 11/15/2022    11:18 AM 7/11/2023     4:12 PM 10/13/2023    12:54 AM 10/26/2023    10:36 PM 11/20/2023     2:17 PM   PROMIS-10 Scores Only   Global Mental Health Score  10 11 10 9 9   Global Physical Health Score  15 16 16 15 16   PROMIS TOTAL - SUBSCORES  25 27 26 24 25       Information is confidential and restricted. Go to Review Flowsheets to unlock data.         ASSESSMENT: Current Emotional / Mental Status (status of significant symptoms):   Risk status (Self / Other harm or suicidal ideation)   Patient denies current fears or concerns for personal safety.   Patient denies current or recent suicidal ideation or behaviors.   Patient denies current or recent homicidal ideation or behaviors.   Patient denies current or recent self injurious behavior or ideation.   Patient denies other safety concerns.   Patient reports there has been no change in risk factors since their last session.     Patient reports there has been no change in protective factors since their last session.     Recommended that patient call 911 or go to the local ED should there be a change in any of these risk factors.     Appearance:   Appropriate    Eye Contact:   Good    Psychomotor Behavior: Normal    Attitude:   Cooperative    Orientation:   All   Speech    Rate / Production: Hyperverbal     Volume:  Normal    Mood:    Anxious    Affect:    Worrisome    Thought Content:  Rumination    Thought Form:  Logical    Insight:    Good      Medication Review:   No changes to current psychiatric medication(s)     Medication Compliance:   Yes     Changes in Health Issues:   None reported     Chemical Use Review:   Substance Use: Chemical use reviewed, no active concerns identified      Tobacco Use: No current tobacco use.      Diagnosis:  1. ADHD (attention deficit hyperactivity disorder), inattentive type    2. LYUBOV  (generalized anxiety disorder)        Collateral Reports Completed:   Not Applicable    PLAN: (Patient Tasks / Therapist Tasks / Other)  Patient will identify ways to hold herself accountable. We will discuss next session.         Aline Shabazz, [facility] 1/5/2024    ___________________________________________________________                                              Individual Treatment Plan    Patient's Name: Tressa Jeong  YOB: 1975    Date of Creation: 7/28/2023  Date Treatment Plan Last Reviewed/Revised: 10/27/2023    DSM5 Diagnoses: Attention-Deficit/Hyperactivity Disorder  314.00 (F90.0) Predominantly inattentive presentation or 300.02 (F41.1) Generalized Anxiety Disorder  Psychosocial / Contextual Factors: Patient is currently unemployed. Patient is living with her dad part time. Patient is single.   PROMIS (reviewed every 90 days):The following assessments were completed by patient for this visit:  PROMIS 10-Global Health (only subscores and total score):       9/13/2022     3:45 PM 11/15/2022    11:18 AM 7/11/2023     4:12 PM 10/13/2023    12:54 AM 10/26/2023    10:36 PM 11/20/2023     2:17 PM   PROMIS-10 Scores Only   Global Mental Health Score  10 11 10 9 9   Global Physical Health Score  15 16 16 15 16   PROMIS TOTAL - SUBSCORES  25 27 26 24 25       Information is confidential and restricted. Go to Review Flowsheets to unlock data.        Referral / Collaboration:  Referral to another professional/service is not indicated at this time.    Anticipated number of session for this episode of care:  50  Anticipation frequency of session: Every other week  Anticipated Duration of each session: 38-52 minutes  Treatment plan will be reviewed in 90 days or when goals have been changed.       MeasurableTreatment Goal(s) related to diagnosis / functional impairment(s)  Goal 1: Patient will work on stabilizing symptoms of ADHD.      Objective #A  Patient will learn about the diagnosis and  symptoms of ADHD.   Status: Continued - Date(s): 10/27/2023    Intervention(s)  Therapist will teach about the diagnosis of ADHD.     Objective #B  Patient will practice setting goals and completing them.                         Status: Continued - Date(s): 10/27/2023     Intervention(s)  Therapist will teach SMART goals and how to break tasks down into smaller tasks.     Objective #C  Patient will learn 3 skills to help increase motivation and organization.   Status: Continued - Date(s): 10/27/2023    Intervention(s)  Therapist will assign homework of using coping skills to increase motivation and organizational skills.    Objective #D  Patient will learn about how ADHD can impact social interactions and interpersonal relationships.  Status:Continued - Date(s): 10/27/2023    Intervention(s)  Therapist will teach about how ADHD can impact social interactions and interpersonal relationships.         Goal 2: Client will work on stabilizing anxiety symptoms as evidenced by LYUBOV-7 scores (current is 6) and Self report.  Goal is to reduce by five points.      I will know I've met my goal when I can better manage my anxiety .      Objective #A Client will identify triggers of anxiety and process them in session.    Status: Continued - Date(s): 10/27/2023    Intervention(s)  Therapist will teach emotional recognition/identification by assigning homework to journal with written exercises.    Objective #B  Client will identify initial signs or symptoms of anxiety.    Status: Continued - Date(s): 10/27/2023    Intervention(s)  Therapist will teach emotional regulation skills, such as deep breathing and mindfulness skills.    Objective #C  Client will learn about co-morbidities between ADHD and LYUBOV.   Status: Continued - Date(s): 10/27/2023    Intervention(s)  Therapist will provide educational materials on symptoms of ADHD and LYUBOV.          Patient has reviewed and agreed to the above plan.      Aline Shabazz, [facility]  July  28, 2023  Reviewed 10/27/2023

## 2024-01-16 ASSESSMENT — SLEEP AND FATIGUE QUESTIONNAIRES
HOW LIKELY ARE YOU TO NOD OFF OR FALL ASLEEP IN A CAR, WHILE STOPPED FOR A FEW MINUTES IN TRAFFIC: WOULD NEVER DOZE
HOW LIKELY ARE YOU TO NOD OFF OR FALL ASLEEP WHILE SITTING AND READING: SLIGHT CHANCE OF DOZING
HOW LIKELY ARE YOU TO NOD OFF OR FALL ASLEEP WHILE SITTING QUIETLY AFTER LUNCH WITHOUT ALCOHOL: SLIGHT CHANCE OF DOZING
HOW LIKELY ARE YOU TO NOD OFF OR FALL ASLEEP WHEN YOU ARE A PASSENGER IN A CAR FOR AN HOUR WITHOUT A BREAK: SLIGHT CHANCE OF DOZING
HOW LIKELY ARE YOU TO NOD OFF OR FALL ASLEEP WHILE LYING DOWN TO REST IN THE AFTERNOON WHEN CIRCUMSTANCES PERMIT: MODERATE CHANCE OF DOZING
HOW LIKELY ARE YOU TO NOD OFF OR FALL ASLEEP WHILE SITTING INACTIVE IN A PUBLIC PLACE: WOULD NEVER DOZE
HOW LIKELY ARE YOU TO NOD OFF OR FALL ASLEEP WHILE WATCHING TV: SLIGHT CHANCE OF DOZING
HOW LIKELY ARE YOU TO NOD OFF OR FALL ASLEEP WHILE SITTING AND TALKING TO SOMEONE: SLIGHT CHANCE OF DOZING

## 2024-01-19 ENCOUNTER — VIRTUAL VISIT (OUTPATIENT)
Dept: PSYCHOLOGY | Facility: CLINIC | Age: 49
End: 2024-01-19
Payer: COMMERCIAL

## 2024-01-19 DIAGNOSIS — F90.0 ADHD (ATTENTION DEFICIT HYPERACTIVITY DISORDER), INATTENTIVE TYPE: Primary | ICD-10-CM

## 2024-01-19 DIAGNOSIS — F41.1 GAD (GENERALIZED ANXIETY DISORDER): ICD-10-CM

## 2024-01-19 PROCEDURE — 90837 PSYTX W PT 60 MINUTES: CPT | Mod: 95

## 2024-01-19 ASSESSMENT — PATIENT HEALTH QUESTIONNAIRE - PHQ9: SUM OF ALL RESPONSES TO PHQ QUESTIONS 1-9: 9

## 2024-01-19 ASSESSMENT — ANXIETY QUESTIONNAIRES
6. BECOMING EASILY ANNOYED OR IRRITABLE: MORE THAN HALF THE DAYS
IF YOU CHECKED OFF ANY PROBLEMS ON THIS QUESTIONNAIRE, HOW DIFFICULT HAVE THESE PROBLEMS MADE IT FOR YOU TO DO YOUR WORK, TAKE CARE OF THINGS AT HOME, OR GET ALONG WITH OTHER PEOPLE: SOMEWHAT DIFFICULT
3. WORRYING TOO MUCH ABOUT DIFFERENT THINGS: SEVERAL DAYS
2. NOT BEING ABLE TO STOP OR CONTROL WORRYING: SEVERAL DAYS
5. BEING SO RESTLESS THAT IT IS HARD TO SIT STILL: NEARLY EVERY DAY
GAD7 TOTAL SCORE: 11
GAD7 TOTAL SCORE: 11
7. FEELING AFRAID AS IF SOMETHING AWFUL MIGHT HAPPEN: NOT AT ALL
1. FEELING NERVOUS, ANXIOUS, OR ON EDGE: SEVERAL DAYS
4. TROUBLE RELAXING: NEARLY EVERY DAY

## 2024-01-19 ASSESSMENT — COLUMBIA-SUICIDE SEVERITY RATING SCALE - C-SSRS
2. HAVE YOU ACTUALLY HAD ANY THOUGHTS OF KILLING YOURSELF?: NO
6. HAVE YOU EVER DONE ANYTHING, STARTED TO DO ANYTHING, OR PREPARED TO DO ANYTHING TO END YOUR LIFE?: NO
SUICIDE, SINCE LAST CONTACT: NO
1. SINCE LAST CONTACT, HAVE YOU WISHED YOU WERE DEAD OR WISHED YOU COULD GO TO SLEEP AND NOT WAKE UP?: NO
ATTEMPT SINCE LAST CONTACT: NO
TOTAL  NUMBER OF ABORTED OR SELF INTERRUPTED ATTEMPTS SINCE LAST CONTACT: NO
TOTAL  NUMBER OF INTERRUPTED ATTEMPTS SINCE LAST CONTACT: NO

## 2024-01-19 NOTE — PROGRESS NOTES
[facility]                                     Progress Note    Patient Name: Tressa Jeong  Date: 1/19/2024      Service Type: Individual      Session Start Time: 11:29 AM  Session End Time: 12:26 PM     Session Length: 53+ min    Session #: 14    Attendees: Client attended alone    Service Modality:  Video Visit:      Provider verified identity through the following two step process.  Patient provided:  Patient is known previously to provider    Telemedicine Visit: The patient's condition can be safely assessed and treated via synchronous audio and visual telemedicine encounter.      Reason for Telemedicine Visit: Patient has requested telehealth visit    Originating Site (Patient Location): Patient's home    Distant Site (Provider Location): Provider Remote Setting- Home Office    Consent:  The patient/guardian has verbally consented to: the potential risks and benefits of telemedicine (video visit) versus in person care; bill my insurance or make self-payment for services provided; and responsibility for payment of non-covered services.     Patient would like the video invitation sent by:  My Chart    Mode of Communication:  Video Conference via [facility]    Distant Location (Provider):  Off-site    As the provider I attest to compliance with applicable laws and regulations related to telemedicine.    DATA  Extended Session (53+ minutes): PROLONGED SERVICE IN THE OUTPATIENT SETTING REQUIRING DIRECT (FACE-TO-FACE) PATIENT CONTACT BEYOND THE USUAL SERVICE:    - Patient's presenting concerns require more intensive intervention than could be completed within the usual service  Interactive Complexity: No  Crisis: No        Progress Since Last Session (Related to Symptoms / Goals / Homework):   Symptoms: Worsening symptoms of anxiety and no change in symptoms of depression based on PHQ-9 and LYUBOV-7 scores and patient self-report.    Homework: Achieved / completed to satisfaction      Episode of  Care Goals: Satisfactory progress - ACTION (Actively working towards change); Intervened by reinforcing change plan / affirming steps taken     Current / Ongoing Stressors and Concerns:   Patient reports she is concerned about ADHD symptoms. Patient reports she is concerned about motivation. Patient reports she is concerned about working. Patient reports she is concerned about task completion.  Patient reports she is concerned about prioritizing tasks. Patient reports her dad is home for the winter. Patient reports concerns about an internal conflict.      Treatment Objective(s) Addressed in This Session:   Patient will learn about the diagnosis and symptoms of ADHD.   Patient will learn about how ADHD can impact social interactions and interpersonal relationships.  Client will identify triggers of anxiety and process them in session.    Client will identify initial signs or symptoms of anxiety.      Intervention:   Motivational Interviewing: supported patient in processing and exploring an internal conflict.   Validated patient's emotions. Supported patient in exploring her relationship with her dad. Supported patient in exploring emotional safety with her dad. Reviewed anxiety as her fight or flight response. Supported patient in identifying symptoms of anxiety that arise when her dad comes to stay. Discussed being mindful of these symptoms and mindful of how she emotionally prepares to have her dad visit.   Reviewed treatment plan and goals with patient.       Assessments completed prior to visit:  The following assessments were completed by patient for this visit:  PHQ9:       4/7/2016    11:17 AM 9/13/2022     8:51 AM 11/15/2022    11:10 AM 7/11/2023     3:49 PM 10/27/2023    11:00 AM 12/8/2023    10:45 AM 1/19/2024    11:00 AM   PHQ-9 SCORE   PHQ-9 Total Score MyChart  17 (Moderately severe depression)  4 (Minimal depression)  10 (Moderate depression)    PHQ-9 Total Score 12 17 15 4 15 10 9     GAD7:        7/12/2023     3:00 PM 8/30/2023     1:00 PM 9/29/2023    11:00 AM 10/27/2023    11:00 AM 11/20/2023     2:00 PM 1/5/2024    11:00 AM 1/19/2024    11:00 AM   LYUBOV-7 SCORE   Total Score 6 5 7 9 12 9 11     PROMIS 10-Global Health (only subscores and total score):       9/13/2022     3:45 PM 11/15/2022    11:18 AM 7/11/2023     4:12 PM 10/13/2023    12:54 AM 10/26/2023    10:36 PM 11/20/2023     2:17 PM 1/19/2024    11:39 AM   PROMIS-10 Scores Only   Global Mental Health Score  10 11 10 9 9 10   Global Physical Health Score  15 16 16 15 16 17   PROMIS TOTAL - SUBSCORES  25 27 26 24 25 27       Information is confidential and restricted. Go to Review Flowsheets to unlock data.         ASSESSMENT: Current Emotional / Mental Status (status of significant symptoms):   Risk status (Self / Other harm or suicidal ideation)   Patient denies current fears or concerns for personal safety.   Patient denies current or recent suicidal ideation or behaviors.   Patient denies current or recent homicidal ideation or behaviors.   Patient denies current or recent self injurious behavior or ideation.   Patient denies other safety concerns.   Patient reports there has been no change in risk factors since their last session.     Patient reports there has been no change in protective factors since their last session.     Recommended that patient call 911 or go to the local ED should there be a change in any of these risk factors.     Appearance:   Appropriate    Eye Contact:   Good    Psychomotor Behavior: Normal    Attitude:   Cooperative    Orientation:   All   Speech    Rate / Production: Hyperverbal     Volume:  Normal    Mood:    Anxious    Affect:    Worrisome    Thought Content:  Rumination    Thought Form:  Logical    Insight:    Good      Medication Review:   No changes to current psychiatric medication(s)     Medication Compliance:   Yes     Changes in Health Issues:   None reported     Chemical Use Review:   Substance Use:  Chemical use reviewed, no active concerns identified      Tobacco Use: No current tobacco use.      Diagnosis:  1. ADHD (attention deficit hyperactivity disorder), inattentive type    2. LYUBOV (generalized anxiety disorder)        Collateral Reports Completed:   Not Applicable    PLAN: (Patient Tasks / Therapist Tasks / Other)  Patient will be mindful of what symptoms of anxiety are present when her dad comes to stay. Patient will be mindful of how she emotionally prepares for him to visit.  We will discuss next session.         Aline Shabazz, [facility] 1/19/2024    ___________________________________________________________                                              Individual Treatment Plan    Patient's Name: Tressa Jeong  YOB: 1975    Date of Creation: 7/28/2023  Date Treatment Plan Last Reviewed/Revised: 1/19/2024  DSM5 Diagnoses: Attention-Deficit/Hyperactivity Disorder  314.00 (F90.0) Predominantly inattentive presentation or 300.02 (F41.1) Generalized Anxiety Disorder  Psychosocial / Contextual Factors: Patient is currently unemployed. Patient is living with her dad part time. Patient is single.   PROMIS (reviewed every 90 days):The following assessments were completed by patient for this visit:  PROMIS 10-Global Health (only subscores and total score):       9/13/2022     3:45 PM 11/15/2022    11:18 AM 7/11/2023     4:12 PM 10/13/2023    12:54 AM 10/26/2023    10:36 PM 11/20/2023     2:17 PM 1/19/2024    11:39 AM   PROMIS-10 Scores Only   Global Mental Health Score  10 11 10 9 9 10   Global Physical Health Score  15 16 16 15 16 17   PROMIS TOTAL - SUBSCORES  25 27 26 24 25 27       Information is confidential and restricted. Go to Review Flowsheets to unlock data.        Referral / Collaboration:  Referral to another professional/service is not indicated at this time.    Anticipated number of session for this episode of care:  50  Anticipation frequency of session: Every other  week  Anticipated Duration of each session: 53 or more minutes  Treatment plan will be reviewed in 90 days or when goals have been changed.       MeasurableTreatment Goal(s) related to diagnosis / functional impairment(s)  Goal 1: Patient will work on stabilizing symptoms of ADHD.      Objective #A  Patient will learn about the diagnosis and symptoms of ADHD.   Status: Continued - Date(s): 1/19/2024    Intervention(s)  Therapist will teach about the diagnosis of ADHD.     Objective #B  Patient will practice setting goals and completing them.                         Status: Continued - Date(s): 1/19/2024     Intervention(s)  Therapist will teach SMART goals and how to break tasks down into smaller tasks.     Objective #C  Patient will learn 3 skills to help increase motivation and organization.   Status: Continued - Date(s): 1/19/2024    Intervention(s)  Therapist will assign homework of using coping skills to increase motivation and organizational skills.    Objective #D  Patient will learn about how ADHD can impact social interactions and interpersonal relationships.  Status:Continued - Date(s): 1/19/2024    Intervention(s)  Therapist will teach about how ADHD can impact social interactions and interpersonal relationships.         Goal 2: Client will work on stabilizing anxiety symptoms as evidenced by LYUBOV-7 scores (current is 11) and Self report.  Goal is to reduce by five points.      I will know I've met my goal when I can better manage my anxiety .      Objective #A Client will identify triggers of anxiety and process them in session.    Status: Continued - Date(s): 1/19/2024    Intervention(s)  Therapist will teach emotional recognition/identification by assigning homework to journal with written exercises.    Objective #B  Client will identify initial signs or symptoms of anxiety.    Status: Continued - Date(s): 1/19/2024    Intervention(s)  Therapist will teach emotional regulation skills, such as deep  breathing and mindfulness skills.    Objective #C  Client will learn about co-morbidities between ADHD and LYUBOV.   Status: Continued - Date(s): 1/19/2024    Intervention(s)  Therapist will provide educational materials on symptoms of ADHD and YLUBOV.          Patient has reviewed and agreed to the above plan.      Aline Shabazz, [facility]  July 28, 2023  Reviewed 10/27/2023  Re-reviewed 1/19/2024

## 2024-01-22 ENCOUNTER — THERAPY VISIT (OUTPATIENT)
Dept: SLEEP MEDICINE | Facility: CLINIC | Age: 49
End: 2024-01-22
Payer: COMMERCIAL

## 2024-01-22 DIAGNOSIS — G47.33 OSA (OBSTRUCTIVE SLEEP APNEA): ICD-10-CM

## 2024-01-22 PROCEDURE — 95810 POLYSOM 6/> YRS 4/> PARAM: CPT | Performed by: INTERNAL MEDICINE

## 2024-01-23 NOTE — PATIENT INSTRUCTIONS
[facility]    1. Your sleep study will be reviewed by a sleep physician within the next few days.     2. Please follow up in the sleep clinic as scheduled, or, make an appointment with your sleep provider to be seen within two weeks to discuss the results of the sleep study.    3. If you have any questions or problems with your treatment plan, please contact your sleep clinic provider at [phone] to further manage your condition.    4. Please review your attached medication list, and, at your follow-up appointment advise your sleep clinic provider about any changes.    5. Go to [email or web address] for more information about your sleep problems.    ANA Dolan  January 23, 2024

## 2024-01-23 NOTE — NURSING NOTE
Diagnostic PSG completed per provider order.  Patient did not meet criteria for PAP therapy for split protocol.

## 2024-02-10 ASSESSMENT — SLEEP AND FATIGUE QUESTIONNAIRES
HOW LIKELY ARE YOU TO NOD OFF OR FALL ASLEEP WHILE WATCHING TV: SLIGHT CHANCE OF DOZING
HOW LIKELY ARE YOU TO NOD OFF OR FALL ASLEEP WHILE SITTING QUIETLY AFTER LUNCH WITHOUT ALCOHOL: SLIGHT CHANCE OF DOZING
HOW LIKELY ARE YOU TO NOD OFF OR FALL ASLEEP WHILE LYING DOWN TO REST IN THE AFTERNOON WHEN CIRCUMSTANCES PERMIT: MODERATE CHANCE OF DOZING
HOW LIKELY ARE YOU TO NOD OFF OR FALL ASLEEP WHILE SITTING AND TALKING TO SOMEONE: WOULD NEVER DOZE
HOW LIKELY ARE YOU TO NOD OFF OR FALL ASLEEP IN A CAR, WHILE STOPPED FOR A FEW MINUTES IN TRAFFIC: WOULD NEVER DOZE
HOW LIKELY ARE YOU TO NOD OFF OR FALL ASLEEP WHILE SITTING INACTIVE IN A PUBLIC PLACE: WOULD NEVER DOZE
HOW LIKELY ARE YOU TO NOD OFF OR FALL ASLEEP WHEN YOU ARE A PASSENGER IN A CAR FOR AN HOUR WITHOUT A BREAK: SLIGHT CHANCE OF DOZING
HOW LIKELY ARE YOU TO NOD OFF OR FALL ASLEEP WHILE SITTING AND READING: MODERATE CHANCE OF DOZING

## 2024-02-13 ENCOUNTER — OFFICE VISIT (OUTPATIENT)
Dept: SLEEP MEDICINE | Facility: CLINIC | Age: 49
End: 2024-02-13
Payer: COMMERCIAL

## 2024-02-13 VITALS
WEIGHT: 196.5 LBS | HEIGHT: 65 IN | DIASTOLIC BLOOD PRESSURE: 79 MMHG | BODY MASS INDEX: 32.74 KG/M2 | RESPIRATION RATE: 16 BRPM | HEART RATE: 89 BPM | OXYGEN SATURATION: 100 % | SYSTOLIC BLOOD PRESSURE: 121 MMHG

## 2024-02-13 DIAGNOSIS — G47.33 OBSTRUCTIVE SLEEP APNEA (ADULT) (PEDIATRIC): ICD-10-CM

## 2024-02-13 DIAGNOSIS — G47.21 CIRCADIAN RHYTHM SLEEP DISORDER, DELAYED SLEEP PHASE TYPE: Primary | ICD-10-CM

## 2024-02-13 DIAGNOSIS — F51.04 PSYCHOPHYSIOLOGICAL INSOMNIA: ICD-10-CM

## 2024-02-13 PROCEDURE — 99215 OFFICE O/P EST HI 40 MIN: CPT | Performed by: INTERNAL MEDICINE

## 2024-02-13 RX ORDER — DEXTROAMPHETAMINE SACCHARATE, AMPHETAMINE ASPARTATE, DEXTROAMPHETAMINE SULFATE AND AMPHETAMINE SULFATE 3.75; 3.75; 3.75; 3.75 MG/1; MG/1; MG/1; MG/1
20 TABLET ORAL DAILY
COMMUNITY
Start: 2023-12-26

## 2024-02-13 NOTE — NURSING NOTE
Scheduled 3 month follow up on 5/13/2024 with Dr. Tam. Printed AVS.     Georgina Beulah   Clinic Assistant  [facility]

## 2024-02-13 NOTE — PROGRESS NOTES
[facility]   [facility]  Outpatient Sleep Medicine Consultation  February 13, 2024    Name: Tressa Jeong MRN# [id number]   Age: 48 year old YOB: 1975     Date of Consultation: February 13, 2024  Consultation is requested by: No referring provider defined for this encounter.  Primary care provider: Yi Vegas  Tressa Jeong is a 48 year old female              Assessment and Plan:   48 years old Tressa with a recent diagnosis of ADHD is here for a follow-up clinic appointment to discuss her recent sleep study and overall sleep.  Her history is most suggestive of circadian rhythm disorder, delayed sleep phase type.  Her natural sleep cycle appears to be between 3 AM till 11 AM.  Her primary complaint of sleep initiation is related to her circadian misalignment.  She also complained of sleep maintenance issues which are due to respiratory events as confirmed on her polysomnogram on January 22, 2024.  The study confirmed presence of mild obstructive sleep apnea with positional and sleep stage related variability which likely is a trigger for arousal and perpetuated as sleep maintenance insomnia.  At present, Tressa does not want to pursue phase advancement.  Sleep disordered breathing.  Diagnosed as mild obstructive sleep apnea with an AHI of 5.5 events per hour.  She does have significant positional variability, her supine AHI was 12.4 events per hour and the highest number of events were seen during supine REM sleep.  We discussed in detail regarding night to night variability of her sleep position which can change the severity of her obstructive respiratory events and be a trigger for arousal and sleep maintenance issues.  We discussed various treatment options and particularly positional therapy. Tressa has been without a job for over a year and it would be difficult for her to afford an out-of-pocket positional device.  We discussed various  options for homemade devices which can help her with lateral sleep.  Delayed sleep phase type-circadian rhythm disorder.  Her clinical history is most consistent with significant phase delay with a natural sleep time between 3 AM to 4 AM and her natural wake time between 11 AM to 1 PM.  In past with her work she struggled quite a bit with her sleep-wake cycle.  I discussed in detail regarding phase advancement using melatonin and timed light.  The treatment plan is quite overwhelming for her at this time and she does not want to pursue phase advancement now.  We did discussed about impact of timed light exposure which can interfere and cause of phase shifts.  Poor sleep hygiene.  She has inconsistent sleep and wake time which is the primary reason for triggering her sleep initiation insomnia.  Importance of maintenance of consistent sleep time was reiterated along with avoidance of light before 11 AM.  Insomnia.  Her sleep initiation insomnia is likely triggered by her inadequate sleep hygiene, inconsistent sleep time.  The latter is usually triggered by her need/desire for timed arousal in the morning either for an appointment or a particular chore.  She also has sleep maintenance insomnia which is likely triggered by untreated sleep disordered breathing compounded by phase delayed chronic biology.      Comorbid Diagnoses:    Attention deficit hyperactivity disorder.  She has been recently started on amphetamine-dextroamphetamine 15 mg a day with a 5 mg as needed dose.      Summary Recommendations:    Positional therapy is recommended.  She can use a small backpack stuffed with a pillow and wear it to avoid supine sleep.  Sleep hygiene, particularly consistent bedtime, stimulus control and spending restricted time in bed was discussed in detail and recommended.  Appropriate timed light exposure.  She is advised to wear dark glasses if possible and use dark curtains at her residence to avoid exposure to light before  11 AM.    Summary Counseling:  New sleep schedule recommendation: Follow-up appointment in 3 months has been recommended.  She is advised to sleep consistently at 3 AM and wake up no earlier than 11 AM.  She should avoid light exposure in the morning prior to 11 AM.    Check out [email or web address]           History of Present Illness:     Tressa Jeong is a 48 year old female with a recent diagnosis of attention deficit hyperactivity disorder taking Adderall is here for a follow-up visit in our clinic after her recent polysomnogram test on January 22, 2024.  I discussed the findings on the sleep study in detail with her.  The overall sleep efficiency was low at 66.7%.  It took her over 2 hours to fall asleep with a sleep latency of 130.5 minutes.  The REM latency was also increased at 151.5 minutes.  Her overall apnea-hypopnea index was mildly elevated at 5.5 events per hour with a respiratory distress index of 8.3 events per hour.  There was significant positional variability with her supine apnea-hypopnea index of 12.4 events per hour with the highest number of events seen during supine REM sleep with 36.9 events per hour.    Trsesa throughout her life has struggled with her sleep time.  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  Her natural sleep time seems to be around 3 AM to 4 AM with a sleep duration between 8 to 9 hours and a natural arousal between 11 AM to 1 PM.  She used to work as a  till about a year ago where she would start her job around 10 AM.  She stated that it is very difficult for her to wake before 9 AM.  Over the past several years she was having issues with waking up 2-3 times during the night usually twice to use the restroom.  She is able to fall back to sleep easily but if the arousal was earlier than 2 AM it may take her 30 minutes to sometimes an hour to fall back to sleep.  She describes herself go-getter personality but has to struggle  with her sleep wake times.  It has taken a toll over years and may have resulted in negative interactions with other people.  Without an alarm she would wake up between 11 to 1 PM.  On occasions if she sleeps any later than 3 AM she would wake up between 2 to 3 PM.  These timings were more consistent during the COVID lockdown and she continues to struggle on days when she has to wake up early to make up to an appointment. We discussed in detail today regarding circadian rhythm disorders particularly delayed sleep phase type.  We also discussed in detail regarding phase advancement.  She appeared overwhelmed and stated that she may not be ready for it now.    PREVIOUS SLEEP STUDIES:  Date: 01/22/2024  AHI: 5.5/hour, supine AHI 12.4/hour.  Sleep Architecture: Poor sleep efficiency - 66.6%, Sleep Latency 130.5 minutes, WASO 49 minutes.  MSLT/MWT:     CURRENT THERAPY-Subjective:  Sleep wake schedule: Currently not working for over a year.  Bedtime 3 AM - 4 AM  Awakening 11 AM - 12 PM  Using alarm No  Awakenings for 2-3 time to use the bathroom, usually 2-5 minutes every night/week     Total score - Haines: 7 (2/10/2024 10:24 AM)           Medications:     Current Outpatient Medications   Medication Sig    amphetamine-dextroamphetamine (ADDERALL) 15 MG tablet Take 15 mg by mouth daily    betamethasone valerate (VALISONE) 0.1 % external ointment Apply topically 2 times daily Apply twice daily as needed for bug bites.    buPROPion (WELLBUTRIN XL) 150 MG 24 hr tablet Take 1 tablet every day by oral route.*    clonazePAM (KLONOPIN) 0.5 MG tablet Take 0.5 mg by mouth daily as needed    fluocinonide (LIDEX) 0.05 % external ointment Apply twice daily as needed for bug bites on trunk or extremities    sertraline (ZOLOFT) 50 MG tablet Take 50 mg by mouth daily    SF 5000 PLUS 1.1 % CREA BRUSH WITH A SMALL AMOUNT 2 TIMES PER DAY. DO NOT SWALLOW. DO NOT EAT, DRINK OR RINSE FOR 30 MINUTES AFTERWARD.*     amphetamine-dextroamphetamine (ADDERALL) 5 MG tablet Take 1 tablet by mouth daily in the morning for at least 7 days, can increase to 10 mg daily if tolerated.*     No current facility-administered medications for this visit.        Allergies   Allergen Reactions    Latex     Morphine      Not life treated, burning and painful when administ            Past Medical History:     Does not need 02 supplement at night   Past Medical History:   Diagnosis Date    Depressive disorder              Past Surgical History:    No h/o  upper airway surgery  Past Surgical History:   Procedure Laterality Date    COLONOSCOPY N/A 9/8/2023    Procedure: COLONOSCOPY, WITH POLYPECTOMY;  Surgeon: Mk Garay MD;  [place] [facility]    ORTHOPEDIC SURGERY              Physical Examination:     Objective   Reported vitals:  There were no vitals taken for this visit.   healthy, alert, and no distress  Psych: Alert and oriented times 3; coherent speech, normal   rate and volume, able to articulate logical thoughts, able   to abstract reason, no tangential thoughts, no hallucinations   or delusions  His affect is normal.    Copy to: Yi Vegas    Total of 40 of time was spent with patient, this included the interview and exam, and review of the chart/labs/imaging/sleep study/PAP therapy data on 02/ . Greater than 50% of which was spent counseling and coordinating care.   Yonatan Alfaro MD 2/13/2024     [facility]   Floor 1, [address]   [address] [place] [address]   Appointments: [phone]    [facility]  3rd Floor  [address] , [place] [address]

## 2024-02-13 NOTE — PATIENT INSTRUCTIONS
"          MY TREATMENT INFORMATION FOR SLEEP APNEA-  Tressa Jeong    DOCTOR : Yonatan Alfaro MD    Am I having a sleep study at a sleep center?  --->Due to normal delays, you will be contacted within 2-4 weeks to schedule    Am I having a home sleep study?  --->Watch the video for the device you are using:    -/drop off device-   https://www.NeuralStemube.com/watch?v=yGGFBdELGhk    -Disposable device sent out require phone/computer application-   https://www.Flowboard.com/watch?v=BCce_vbiwxE      Frequently asked questions:  1. What is Obstructive Sleep Apnea (PEGGY)? PEGGY is the most common type of sleep apnea. Apnea means, \"without breath.\"  Apnea is most often caused by narrowing or collapse of the upper airway as muscles relax during sleep.   Almost everyone has occasional apneas. Most people with sleep apnea have had brief interruptions at night frequently for many years.  The severity of sleep apnea is related to how frequent and severe the events are.   2. What are the consequences of PEGGY? Symptoms include: feeling sleepy during the day, snoring loudly, gasping or stopping of breathing, trouble sleeping, and occasionally morning headaches or heartburn at night.  Sleepiness can be serious and even increase the risk of falling asleep while driving. Other health consequences may include development of high blood pressure and other cardiovascular disease in persons who are susceptible. Untreated PEGGY  can contribute to heart disease, stroke and diabetes.   3. What are the treatment options? In most situations, sleep apnea is a lifelong disease that must be managed with daily therapy. Medications are not effective for sleep apnea and surgery is generally not considered until other therapies have been tried. Your treatment is your choice . Continuous Positive Airway (CPAP) works right away and is the therapy that is effective in nearly everyone. An oral device to hold your jaw forward is usually the next most reliable " option. Other options include postioning devices (to keep you off your back), weight loss, and surgery including a tongue pacing device. There is more detail about some of these options below.  4. Are my sleep studies covered by insurance? Although we will request verification of coverage, we advise you also check in advance of the study to ensure there is coverage.    Important tips for those choosing CPAP and similar devices  For new devices, sign up for device JACKSON to monitor your device for your followup visits  We encourage you to utilize the [place] jackson or website (myAir web (resmed.com) ) to monitor your therapy progress and share the data with your healthcare team when you discuss your sleep apnea.                                                    Know your equipment:  CPAP is continuous positive airway pressure that prevents obstructive sleep apnea by keeping the throat from collapsing while you are sleeping. In most cases, the device is  smart  and can slowly self-adjusts if your throat collapses and keeps a record every day of how well you are treated-this information is available to you and your care team.  BPAP is bilevel positive airway pressure that keeps your throat open and also assists each breath with a pressure boost to maintain adequate breathing.  Special kinds of BPAP are used in patients who have inadequate breathing from lung or heart disease. In most cases, the device is  smart  and can slowly self-adjusts to assist breathing. Like CPAP, the device keeps a record of how well you are treated.  Your mask is your connection to the device. You get to choose what feels most comfortable and the staff will help to make sure if fits. Here: are some examples of the different masks that are available: Magnetic mask aids may assist with use but there are safety issues that should be addressed when considering with magnets* ( see end of discussion).       Key points to remember on your journey  with sleep apnea:  Sleep study.  PAP devices often need to be adjusted during a sleep study to show that they are effective and adjusted right.  Good tips to remember: Try wearing just the mask during a quiet time during the day so your body adapts to wearing it. A humidifier is recommended for comfort in most cases to prevent drying of your nose and throat. Allergy medication from your provider may help you if you are having nasal congestion.  Getting settled-in. It takes more than one night for most of us to get used to wearing a mask. Try wearing just the mask during a quiet time during the day so your body adapts to wearing it. A humidifier is recommended for comfort in most cases. Our team will work with you carefully on the first day and will be in contact within 4 days and again at 2 and 4 weeks for advice and remote device adjustments. Your therapy is evaluated by the device each day.   Use it every night. The more you are able to sleep naturally for 7-8 hours, the more likely you will have good sleep and to prevent health risks or symptoms from sleep apnea. Even if you use it 4 hours it helps. Occasionally all of us are unable to use a medical therapy, in sleep apnea, it is not dangerous to miss one night.   Communicate. Call our skilled team on the number provided on the first day if your visit for problems that make it difficult to wear the device. Over 2 out of 3 patients can learn to wear the device long-term with help from our team. Remember to call our team or your sleep providers if you are unable to wear the device as we may have other solutions for those who cannot adapt to mask CPAP therapy. It is recommended that you sleep your sleep provider within the first 3 months and yearly after that if you are not having problems.   Use it for your health. We encourage use of CPAP masks during daytime quiet periods to allow your face and brain to adapt to the sensation of CPAP so that it will be a more  natural sensation to awaken to at night or during naps. This can be very useful during the first few weeks or months of adapting to CPAP though it does not help medically to wear CPAP during wakefulness and  should not be used as a strategy just to meet guidelines.  Take care of your equipment. Make sure you clean your mask and tubing using directions every day and that your filter and mask are replaced as recommended or if they are not working.     *Masks with magnets:  Updated Contraindications  Masks with magnetic components are contraindicated for use by patients where they, or anyone in close physical contact while using the mask, have the following:   Active medical implants that interact with magnets (i.e., pacemakers, implantable cardioverter defibrillators (ICD), neurostimulators, cerebrospinal fluid (CSF) shunts, insulin/infusion pumps)   Metallic implants/objects containing ferromagnetic material (i.e., aneurysm clips/flow disruption devices, embolic coils, stents, valves, electrodes, implants to restore hearing or balance with implanted magnets, ocular implants, metallic splinters in the eye)  Updated Warning  Keep the mask magnets at a safe distance of at least 6 inches (150 mm) away from implants or medical devices that may be adversely affected by magnetic interference. This warning applies to you or anyone in close physical contact with your mask. The magnets are in the frame and lower headgear clips, with a magnetic field strength of up to 400mT. When worn, they connect to secure the mask but may inadvertently detach while asleep.  Implants/medical devices, including those listed within contraindications, may be adversely affected if they change function under external magnetic fields or contain ferromagnetic materials that attract/repel to magnetic fields (some metallic implants, e.g., contact lenses with metal, dental implants, metallic cranial plates, screws, igor hole covers, and bone substitute  devices). Consult your physician and  of your implant / other medical device for information on the potential adverse effects of magnetic fields.    BESIDES CPAP, WHAT OTHER THERAPIES ARE THERE?    Positioning Device  Positioning devices are generally used when sleep apnea is mild and only occurs on your back.This example shows a pillow that straps around the waist. It may be appropriate for those whose sleep study shows milder sleep apnea that occurs primarily when lying flat on one's back. Preliminary studies have shown benefit but effectiveness at home may need to be verified by a home sleep test. These devices are generally not covered by medical insurance.  Examples of devices that maintain sleeping on the back to prevent snoring and mild sleep apnea.    Belt type body positioner  http://FlowPlay/    Electronic reminder  http://nightshifttherapy.com/            Oral Appliance  What is oral appliance therapy?  An oral appliance device fits on your teeth at night like a retainer used after having braces. The device is made by a specialized dentist and requires several visits over 1-2 months before a manufactured device is made to fit your teeth and is adjusted to prevent your sleep apnea. Once an oral device is working properly, snoring should be improved. A home sleep test may be recommended at that time if to determine whether the sleep apnea is adequately treated.       Some things to remember:  -Oral devices are often, but not always, covered by your medical insurance. Be sure to check with your insurance provider.   -If you are referred for oral therapy, you will be given a list of specialized dentists to consider or you may choose to visit the Web site of the American Academy of Dental Sleep Medicine  -Oral devices are less likely to work if you have severe sleep apnea or are extremely overweight.     More detailed information  An oral appliance is a small acrylic device that fits over the  upper and lower teeth  (similar to a retainer or a mouth guard). This device slightly moves jaw forward, which moves the base of the tongue forward, opens the airway, improves breathing for effective treat snoring and obstructive sleep apnea in perhaps 7 out of 10 people .  The best working devices are custom-made by a dental device  after a mold is made of the teeth 1, 2, 3.  When is an oral appliance indicated?  Oral appliance therapy is recommended as a first-line treatment for patients with primary snoring, mild sleep apnea, and for patients with moderate sleep apnea who prefer appliance therapy to use of CPAP4, 5. Severity of sleep apnea is determined by sleep testing and is based on the number of respiratory events per hour of sleep.   How successful is oral appliance therapy?  The success rate of oral appliance therapy in patients with mild sleep apnea is 75-80% while in patients with moderate sleep apnea it is 50-70%. The chance of success in patients with severe sleep apnea is 40-50%. The research also shows that oral appliances have a beneficial effect on the cardiovascular health of PEGGY patients at the same magnitude as CPAP therapy7.  Oral appliances should be a second-line treatment in cases of severe sleep apnea, but if not completely successful then a combination therapy utilizing CPAP plus oral appliance therapy may be effective. Oral appliances tend to be effective in a broad range of patients although studies show that the patients who have the highest success are females, younger patients, those with milder disease, and less severe obesity. 3, 6.   Finding a dentist that practices dental sleep medicine  Specific training is available through the American Academy of Dental Sleep Medicine for dentists interested in working in the field of sleep. To find a dentist who is educated in the field of sleep and the use of oral appliances, near you, visit the Web site of the American Academy of  Dental Sleep Medicine.    References  1. Willie et al. Objectively measured vs self-reported compliance during oral appliance therapy for sleep-disordered breathing. Chest 2013; 144(5): 5206-4221.  2. Jacy et al. Objective measurement of compliance during oral appliance therapy for sleep-disordered breathing. Thorax 2013; 68(1): 91-96.  3. Alanna et al. Mandibular advancement devices in 620 men and women with PEGGY and snoring: tolerability and predictors of treatment success. Chest 2004; 125: 0007-5116.  4. Paresh et al. Oral appliances for snoring and PEGGY: a review. Sleep 2006; 29: 244-262.  5. Dedra et al. Oral appliance treatment for PEGGY: an update. J Clin Sleep Med 2014; 10(2): 215-227.  6. Rodrigo et al. Predictors of OSAH treatment outcome. J Dent Res 2007; 86: 7920-4864.      Weight Loss:    Weight loss is a long-term strategy that may improve sleep apnea in some patients.    Weight management is a personal decision and the decision should be based on your interest and the potential benefits.  If you are interested in exploring weight loss strategies, the following discussion covers the impact on weight loss on sleep apnea and the approaches that may be successful.    Being overweight does not necessarily mean you will have health consequences.  Those who have BMI over 35 or over 27 with existing medical conditions carries greater risk.   Weight loss decreases severity of sleep apnea in most people with obesity. For those with mild obesity who have developed snoring with weight gain, even 15-30 pound weight loss can improve and occasionally eliminate sleep apnea.  Structured and life-long dietary and health habits are necessary to lose weight and keep healthier weight levels.     Though there may be significant health benefits from weight loss, long-term weight loss is very difficult to achieve- studies show success with dietary management in less than 10% of people. In addition,  substantial weight loss may require years of dietary control and may be difficult if patients have severe obesity. In these cases, surgical management may be considered.  Finally, older individuals who have tolerated obesity without health complications may be less likely to benefit from weight loss strategies.      Body mass index is 32.7 kg/m .    Surgery:    Surgery for obstructive sleep apnea is considered generally only when other therapies fail to work. Surgery may be discussed with you if you are having a difficult time tolerating CPAP and or when there is an abnormal structure that requires surgical correction.  Nose and throat surgeries often enlarge the airway to prevent collapse.  Most of these surgeries create pain for 1-2 weeks and up to half of the most common surgeries are not effective throughout life.  You should carefully discuss the benefits and drawbacks to surgery with your sleep provider and surgeon to determine if it is the best solution for you.   More information  Surgery for PEGGY is directed at areas that are responsible for narrowing or complete obstruction of the airway during sleep.  There are a wide range of procedures available to enlarge and/or stabilize the airway to prevent blockage of breathing in the three major areas where it can occur: the palate, tongue, and nasal regions.  Successful surgical treatment depends on the accurate identification of the factors responsible for obstructive sleep apnea in each person.  A personalized approach is required because there is no single treatment that works well for everyone.  Because of anatomic variation, consultation with an examination by a sleep surgeon is a critical first step in determining what surgical options are best for each patient.  In some cases, examination during sedation may be recommended in order to guide the selection of procedures.  Patients will be counseled about risks and benefits as well as the typical recovery  course after surgery. Surgery is typically not a cure for a person s PEGGY.  However, surgery will often significantly improve one s PEGGY severity (termed  success rate ).  Even in the absence of a cure, surgery will decrease the cardiovascular risk associated with OSA7; improve overall quality of life8 (sleepiness, functionality, sleep quality, etc).      Palate Procedures:  Patients with PEGGY often have narrowing of their airway in the region of their tonsils and uvula.  The goals of palate procedures are to widen the airway in this region as well as to help the tissues resist collapse.  Modern palate procedure techniques focus on tissue conservation and soft tissue rearrangement, rather than tissue removal.  Often the uvula is preserved in this procedure. Residual sleep apnea is common in patient after pharyngoplasty with an average reduction in sleep apnea events of 33%2.      Tongue Procedures:  ExamWhile patients are awake, the muscles that surround the throat are active and keep this region open for breathing. These muscles relax during sleep, allowing the tongue and other structures to collapse and block breathing.  There are several different tongue procedures available.  Selection of a tongue base procedure depends on characteristics seen on physical exam.  Generally, procedures are aimed at removing bulky tissues in this area or preventing the back of the tongue from falling back during sleep.  Success rates for tongue surgery range from 50-62%3.    Hypoglossal Nerve Stimulation:  Hypoglossal nerve stimulation has recently received approval from the [place] Food and Drug Administration for the treatment of obstructive sleep apnea.  This is based on research showing that the system was safe and effective in treating sleep apnea6.  Results showed that the median AHI score decreased 68%, from 29.3 to 9.0. This therapy uses an implant system that senses breathing patterns and delivers mild stimulation to  airway muscles, which keeps the airway open during sleep.  The system consists of three fully implanted components: a small generator (similar in size to a pacemaker), a breathing sensor, and a stimulation lead.  Using a small handheld remote, a patient turns the therapy on before bed and off upon awakening.    Candidates for this device must be greater than 18 years of age, have moderate to severe obstructive sleep apnea with less than 25% central events  (AHI between 15-65), BMI less than 35, have tried CPAP/oral appliance for at least 8 weeks without success, and have appropriate upper airway anatomy (determined by a sleep endoscopy performed by Dr. David Garcia or Dr. Juan Longoria).    Nasal Procedures:  Nasal obstruction can interfere with nasal breathing during the day and night.  Studies have shown that relief of nasal obstruction can improve the ability of some patients to tolerate positive airway pressure therapy for obstructive sleep apnea1.  Treatment options include medications such as nasal saline, topical corticosteroid and antihistamine sprays, and oral medications such as antihistamines or decongestants. Non-surgical treatments can include external nasal dilators for selected patients. If these are not successful by themselves, surgery can improve the nasal airway either alone or in combination with these other options.        Combination Procedures:  Combination of surgical procedures and other treatments may be recommended, particularly if patients have more than one area of narrowing or persistent positional disease.  The success rate of combination surgery ranges from 66-80%2,3.    References  Kyra SAMSON. The Role of the Nose in Snoring and Obstructive Sleep Apnoea: An Update.  Eur Arch Otorhinolaryngol. 2011; 268: 1365-73.   Andree SM; Marguerite JA; Oksana JR; Pallanch JF; Eric OJEDA; Ada PAN; Clementine TAVARES. Surgical modifications of the upper airway for obstructive sleep apnea in adults: a  systematic review and meta-analysis. SLEEP 2010;33(10):9795-5238. Zabrina JACOB. Hypopharyngeal surgery in obstructive sleep apnea: an evidence-based medicine review.  Arch Otolaryngol Head Neck Surg. 2006 Feb;132(2):206-13.  Diego YH1, [place] Y, Sandip APOLINAR. The efficacy of anatomically based multilevel surgery for obstructive sleep apnea. Otolaryngol Head Neck Surg. 2003 Oct;129(4):327-35.  Kezirian E, Goldberg A. Hypopharyngeal Surgery in Obstructive Sleep Apnea: An Evidence-Based Medicine Review. Arch Otolaryngol Head Neck Surg. 2006 Feb;132(2):206-13.  Kun WILLIAM et al. Upper-Airway Stimulation for Obstructive Sleep Apnea.  N Engl J Med. 2014 Jan 9;370(2):139-49.  Annabella Y et al. Increased Incidence of Cardiovascular Disease in Middle-aged Men with Obstructive Sleep Apnea. Am J Respir Crit Care Med; 2002 166: 159-165  Cardoso EM et al. Studying Life Effects and Effectiveness of Palatopharyngoplasty (SLEEP) study: Subjective Outcomes of Isolated Uvulopalatopharyngoplasty. Otolaryngol Head Neck Surg. 2011; 144: 623-631.        WHAT IF I ONLY HAVE SNORING?    Mandibular advancement devices, lateral sleep positioning, long-term weight loss and treatment of nasal allergies have been shown to improve snoring.  Exercising tongue muscles with a game (https://apps.Boundary/us/jackson/soundly-reduce-snoring/wx1661032220) or stimulating the tongue during the day with a device (https://doi.org/10.3390/eto95384331) have improved snoring in some individuals.  https://www.Space Race.com/  https://www.sleepfoundation.org/best-anti-snoring-mouthpieces-and-mouthguards    Remember to Drive Safe... Drive Alive     Sleep health profoundly affects your health, mood, and your safety.  Thirty three percent of the population (one in three of us) is not getting enough sleep and many have a sleep disorder. Not getting enough sleep or having an untreated / undertreated sleep condition may make us sleepy without even knowing it. In fact, our driving  "could be dramatically impaired due to our sleep health. As your provider, here are some things I would like you to know about driving:     Here are some warning signs for impairment and dangerous drowsy driving:              -Having been awake more than 16 hours               -Looking tired               -Eyelid drooping              -Head nodding (it could be too late at this point)              -Driving for more than 30 minutes     Some things you could do to make the driving safer if you are experiencing some drowsiness:              -Stop driving and rest              -Call for transportation              -Make sure your sleep disorder is adequately treated     Some things that have been shown NOT to work when experiencing drowsiness while driving:              -Turning on the radio              -Opening windows              -Eating any  distracting  /  entertaining  foods (e.g., sunflower seeds, candy, or any other)              -Talking on the phone      Your decision may not only impact your life, but also the life of others. Please, remember to drive safe for yourself and all of us.         DELAYED SLEEP PHASE  What is it?   Delayed sleep phase disorder (DSP) is a circadian rhythm disorder. It consists of a typical sleep pattern that is \"delayed\" by two or more hours. This delay occurs when one s internal sleep clock (circadian rhythm) is shifted later at night and later in the morning. Once sleep occurs, the sleep is generally normal. But the delay leads to a pattern of sleep that is later than what is desired or what is considered socially acceptable. This pattern can be a problem when it interferes with work or social demands.  A person with DSP is likely to prefer late bedtimes and late wake-up times. When left to his or her own schedule, a person with DSP is likely to have a normal amount and quality of sleep. It simply occurs at a delayed time. One sign of this disorder is difficulty falling asleep " "until late at night. Another sign is having a hard time getting out of bed in the morning for work or school. These signs can make DSP look like insomnia. Daytime functioning can be severely impaired by DSP. It can lead to excessive sleepiness and fatigue. When able to sleep on their own schedules, people with DSP often stay up until they get tired and then sleep until they awaken late in the morning. In this case, they tend to have no complaint of difficulty falling to sleep or feeling poorly during the day.         Who gets it?   The exact rate of DSP is unknown in the general population. It is much more common in teens and young adults. From 7% to 16% of them may have it. DSP is likely to be found in 10% of people with a complaint of chronic insomnia. People who tend to be \"evening types\" or \"night owls\" are likely to develop DSP.     There is likely to be some genetic component. Some environmental factors may also be involved. A lack of exposure to morning sunlight may make it worse. Too much exposure to bright evening sunlight may also increase symptoms of DSP. A family history of DSP is common in about 40% of people with the disorder.     How do I know if I have it?   Is there a delay in your sleep pattern in relation to your desired sleep and wake times? Do you have trouble falling asleep at the desired time of night? Are you then unable to awaken at the desired or socially acceptable time?   When left to your own sleep schedule, do you have a normal duration and quality of sleep? Does this sleep occur in a stable, but delayed time period in relation to what is desired or socially acceptable?   Have you had this kind of stable but delayed sleep time for at least seven days?   If you answered  yes  to these questions, then you may have delayed sleep phase disorder.  It is also important to know if there is something else that is causing your sleep problems. They may be a result of one of the " following:  Another sleep disorder   A medical condition   Medication use   A mental health disorder   Substance abuse            Do I need to see a sleep specialist?       LIGHT THERAPY  What is it?  Light therapy is a treatment used for people who suffer from circadian rhythm sleep disorders. Your body has an internal clock that tells it when it is time to be asleep and when it is time to be awake.     This clock is located in the brain just above an area where the nerves travel to the eyes. This area is called the SCN. Your clock controls the  circadian rhythms  in your body. These rhythms include body temperature, alertness and the daily cycle of many hormones.     The word  circadian  means to occur in a cycle of about 24 hours. Circadian rhythms make you feel sleepy or alert at regular times every day. Some people have a circadian rhythm sleep disorder. This causes their natural sleep time to overlap with regular awake activities such as work or school.   Among other factors, your clock is  set  by your exposure to bright light such as sunlight. Exposure to bright light or  light therapy  is one method used to treat people with a circadian rhythm sleep disorder.     The goal for treating patients who have circadian rhythm problems is to combine a healthy sleep pattern with an internal clock that is set at the right time. This will allow them to enjoy the benefits of good sleep.     Light therapy can help someone  re-set  a clock that is off. Regular sleep patterns help to keep the clock set at the new time. Light therapy is only part of a treatment plan that should be guided by a doctor who is familiar with sleep disorders.   Light therapy is used to expose your eyes to intense but safe amounts of light for a specific and regular length of time. In many places, sunlight is not available at the proper time to be used as treatment.     Artificial light may be used to affect the body clock in the same way that  sunlight does. New advances continue to be made in this field. Currently, products that are used for light therapy fit into four basic groups:   1. Light Box   This is the most common tool that is used in light therapy. The box houses several tubes that produce extremely bright light. It sits on top of a table or desk and plugs into the wall.     During a treatment session, you have to keep within a certain distance of the box. Usually, you will be about 18 to 24 inches away from it. It does not require you to look directly into the light. Instead, you simply face in the direction of the box.     You are able to do other activities during the session. Ideally, you would work on papers or read something that is in the area being lit up. This will allow the light to be received by your eyes. Your body takes in this information and uses it to regulate the rhythms that control when you sleep and when you wake.   Earlier models of light boxes put out 2,500 to 5,000 lux of light. Lux is a measure of how much light falls on your eyes. These sessions could take two or three hours. Now, many boxes produce 10,000 lux of light. This allows sessions to take as little as 15 to 30 minutes.     More than one session may be needed each day. It depends upon your body, your need, and the strength of light being used. The key is to use the light at the right time of day and for the right amount of time. This is based upon the sleep disorder you want to correct.     New models are also safer, protecting you from harmful UV rays. Some models are now focusing on a specific bandwidth of light. Light boxes can be purchased in a variety of makes and models. Some are now being made much smaller so they are easier to take with you. General prices range from $200 - $500 per light box.   2. Desk Lamp   This serves the same purpose as a light box, but it is made to look like a normal lamp. It blends in better when used in an office setting.   3.  Light Visor   This is a light source that is worn on your head and hangs over your eyes. It looks much like a tennis visor. It is made so that you can move around during sessions. The strength of visor lights also varies from 3,000 to 10,000 lux.   4. Linda Simulator     These lights gradually make a dark room brighter over a set period of time. This is meant to mimic the sunrise. Some people may find that this helps them wake up in the morning. Models may also slowly dim to copy a sunset.          Who gets it?  Bright light therapy is used for people who suffer from circadian rhythm disorders. The time of day when the light is used will depend upon the disorder it is meant to correct. These disorders include the followin. Delayed sleep phase disorder   This causes people to fall asleep much later at night than is normal. As a result, they also wake up later in the morning. This sleep pattern can interfere with their schedule of activities for the day. To correct delayed sleep phase, light treatment takes place during the early morning hours.   2. Advanced sleep phase disorder   This causes people to fall asleep much earlier at night than is normal. They also wake up earlier in the morning. To correct it, light treatment takes place early at night.   3. Free-running or Non-24-hour sleep-wake rhythm   People with this disorder fall asleep at a different time each day. For example, you may fall asleep at 10 p.m. one day, Midnight the next day, 2 a.m. the next, etc. This most often occurs in people who are blind. Light therapy may help blind people, even if they can't perceive visible light. Studies show that light treatment may be useful in the early morning hours.   4. Jet lag   Jet lag causes people to have problems with sleep when they have crossed many time zones on a flight. Light therapy in the morning may help when traveling east. For travel to the west, bright light in the evening may help reduce jet  lag.   5. Shift Work   This sleep disorder occurs due to a work schedule, such as night shift, that takes place during the time when most people are sleeping. This schedule requires you to work when your body wants to sleep. Then you have to try to sleep when your body expects to be awake. Correcting it can be a hard problem to solve. Changing work schedules, days off, and social activities can alter your exposure to light from day to day. Frequent changes in your sleep times make it hard to re-set your internal clock. In general, using light treatment in the evening should help someone who regularly works nights. In this case, you would also want to avoid daylight when you come off work and go to bed. Dark sunglasses or special goggles can help.   6. Seasonal affective disorder (SAD)   SAD is a mood disorder that can cause people to feel sad and lack energy during the dark months of winter. A similar and milder version is often called the  Winter Blues.  In severe forms, sadness may be caused by depression. Light therapy is thought to be useful as one of the treatments for seasonal mood disorders and depression. Depression is also treated with medications. You should consult your doctor if you are having serious problems with sadness.         Possible side effects?  Light therapy has a good record of safety. It does not seem to produce any major side effects. Light therapy should always be used within the proper limits for intensity and time. Minor side effects may include the following:   Eye irritation and dryness   Headache   Nausea   Dryness of skin   To reduce these side effects, begin the light therapy very slowly. Give your body time to get used to it. The use of a humidifier can also help with irritations caused by dryness. Talk to your doctor or a sleep specialist before beginning use.       Yes. It is easy to confuse DSP with normal variations of sleep and other types of insomnia. Consulting with a sleep  specialist is your best bet to help clarify current sleep problems. He or she will also be able to help you develop a plan to correct these problems. A specialist can assess the factors that cause and make this problem worse. These include both social and behavioral factors.     What will the doctor need to know?   The sleep doctor will do a thorough physical exam. He or she will also discuss with you the history of these sleep problems. It would be helpful to keep a sleep diary prior to seeing a sleep doctor. Bring this information with you to the appointment. A sleep diary is a systematic way to track your sleep pattern. You record the time you get into bed, the time required to fall asleep, and the time you wake up in the morning. Sleep diaries often show a regular pattern of difficulty falling to sleep. They often show few or no awakenings once asleep. They also tend to show a sleep duration that is reduced during the work week and lengthy on the weekend.     Will I need to take any tests?   An overnight sleep study is not normally needed for someone who suffers from DSP. Your doctor may have you do an overnight sleep study if your problem is severely disturbing your sleep. This study is called a polysomnogram. It charts your brain waves, heart rate, and breathing as you sleep. It also records how your arms and legs move. This study will help determine if there are any objective sleep disorders related to your sleep problem.     How is it treated?   The most accepted treatment for DSP is what is called chronotherapy. This is a method of behavioral treatment. Your bedtime is delayed by about three hours per day for five or six consecutive days. Once the desired bedtime is reached the schedule is frozen. This schedule needs to be maintained rigidly at this point.  Bright light therapy is another proven technique for changing one s internal circadian rhythms. But its specific use for DSP has not been well  validated. In theory, exposure to bright light should occur shortly after waking up at the desired time in the morning. Then bright outdoor light in the evening hours should be avoided.

## 2024-02-25 LAB — SLPCOMP: NORMAL

## 2024-03-01 ENCOUNTER — VIRTUAL VISIT (OUTPATIENT)
Dept: PSYCHOLOGY | Facility: CLINIC | Age: 49
End: 2024-03-01
Payer: COMMERCIAL

## 2024-03-01 DIAGNOSIS — F90.0 ADHD (ATTENTION DEFICIT HYPERACTIVITY DISORDER), INATTENTIVE TYPE: Primary | ICD-10-CM

## 2024-03-01 DIAGNOSIS — F41.1 GAD (GENERALIZED ANXIETY DISORDER): ICD-10-CM

## 2024-03-01 PROCEDURE — 90837 PSYTX W PT 60 MINUTES: CPT | Mod: 95

## 2024-03-01 ASSESSMENT — ANXIETY QUESTIONNAIRES
GAD7 TOTAL SCORE: 7
4. TROUBLE RELAXING: NEARLY EVERY DAY
2. NOT BEING ABLE TO STOP OR CONTROL WORRYING: SEVERAL DAYS
5. BEING SO RESTLESS THAT IT IS HARD TO SIT STILL: NOT AT ALL
6. BECOMING EASILY ANNOYED OR IRRITABLE: SEVERAL DAYS
3. WORRYING TOO MUCH ABOUT DIFFERENT THINGS: SEVERAL DAYS
IF YOU CHECKED OFF ANY PROBLEMS ON THIS QUESTIONNAIRE, HOW DIFFICULT HAVE THESE PROBLEMS MADE IT FOR YOU TO DO YOUR WORK, TAKE CARE OF THINGS AT HOME, OR GET ALONG WITH OTHER PEOPLE: SOMEWHAT DIFFICULT
7. FEELING AFRAID AS IF SOMETHING AWFUL MIGHT HAPPEN: NOT AT ALL
1. FEELING NERVOUS, ANXIOUS, OR ON EDGE: SEVERAL DAYS
GAD7 TOTAL SCORE: 7

## 2024-03-01 NOTE — PROGRESS NOTES
[facility]                                     Progress Note    Patient Name: Tressa Jeong  Date: 3/1/2024      Service Type: Individual      Session Start Time: 11:29 AM  Session End Time: 12:26 PM     Session Length: 53+ min    Session #: 15    Attendees: Client attended alone    Service Modality:  Video Visit:      Provider verified identity through the following two step process.  Patient provided:  Patient is known previously to provider    Telemedicine Visit: The patient's condition can be safely assessed and treated via synchronous audio and visual telemedicine encounter.      Reason for Telemedicine Visit: Patient has requested telehealth visit    Originating Site (Patient Location): Patient's home    Distant Site (Provider Location): Provider Remote Setting- Home Office    Consent:  The patient/guardian has verbally consented to: the potential risks and benefits of telemedicine (video visit) versus in person care; bill my insurance or make self-payment for services provided; and responsibility for payment of non-covered services.     Patient would like the video invitation sent by:  My Chart    Mode of Communication:  [facility]    Distant Location (Provider):  Off-site    As the provider I attest to compliance with applicable laws and regulations related to telemedicine.    DATA  Extended Session (53+ minutes): PROLONGED SERVICE IN THE OUTPATIENT SETTING REQUIRING DIRECT (FACE-TO-FACE) PATIENT CONTACT BEYOND THE USUAL SERVICE:    - Patient's presenting concerns require more intensive intervention than could be completed within the usual service  Interactive Complexity: No  Crisis: No        Progress Since Last Session (Related to Symptoms / Goals / Homework):   Symptoms: Improving symptoms of anxiety based on LYUBOV-7 score and patient self-report.    Homework: Achieved / completed to satisfaction      Episode of Care Goals: Satisfactory progress - ACTION (Actively  working towards change); Intervened by reinforcing change plan / affirming steps taken     Current / Ongoing Stressors and Concerns:   Patient reports she is concerned about ADHD symptoms. Patient reports she is concerned about motivation. Patient reports she is concerned about working. Patient reports she is concerned about task completion.  Patient reports she is concerned about prioritizing tasks. Patient reports her dad is home for the winter. Patient reports concerns about re-entering the workforce.      Treatment Objective(s) Addressed in This Session:   Patient will learn about the diagnosis and symptoms of ADHD.   Patient will learn about how ADHD can impact social interactions and interpersonal relationships.  Client will identify triggers of anxiety and process them in session.    Client will identify initial signs or symptoms of anxiety.      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   Validated patient's emotions. 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 Validated patient's use of skills to advocate for herself.   Supported patient in processing and exploring her goal of re-entering the workforce. Validated patient's emotions. Supported patient in exploring what symptoms of ADHD make this difficult. Discussed poor task completion as a symptom of ADHD. Discussed being mindful of what makes it difficult to complete a task and how she is able to cope with this symptom.       Assessments completed prior to visit:  The following assessments were completed by patient for this visit:  PHQ9:       4/7/2016    11:17 AM 9/13/2022     8:51 AM 11/15/2022    11:10 AM 7/11/2023     3:49 PM 10/27/2023    11:00 AM 12/8/2023    10:45 AM 1/19/2024    11:00 AM   PHQ-9 SCORE   PHQ-9 Total Score MyChart  17 (Moderately severe depression)  4 (Minimal depression)  10 (Moderate depression)    PHQ-9 Total Score 12 17 15 4 15 10 9      GAD7:       8/30/2023     1:00 PM 9/29/2023    11:00 AM 10/27/2023    11:00 AM 11/20/2023     2:00 PM 1/5/2024    11:00 AM 1/19/2024    11:00 AM 3/1/2024    11:00 AM   LYUBOV-7 SCORE   Total Score 5 7 9 12 9 11 7     PROMIS 10-Global Health (only subscores and total score):       11/15/2022    11:18 AM 7/11/2023     4:12 PM 10/13/2023    12:54 AM 10/26/2023    10:36 PM 11/20/2023     2:17 PM 1/19/2024    11:39 AM 2/29/2024    12:43 PM   PROMIS-10 Scores Only   Global Mental Health Score 10 11 10 9 9 10 11   Global Physical Health Score 15 16 16 15 16 17 14   PROMIS TOTAL - SUBSCORES 25 27 26 24 25 27 25         ASSESSMENT: Current Emotional / Mental Status (status of significant symptoms):   Risk status (Self / Other harm or suicidal ideation)   Patient denies current fears or concerns for personal safety.   Patient denies current or recent suicidal ideation or behaviors.   Patient denies current or recent homicidal ideation or behaviors.   Patient denies current or recent self injurious behavior or ideation.   Patient denies other safety concerns.   Patient reports there has been no change in risk factors since their last session.     Patient reports there has been no change in protective factors since their last session.     Recommended that patient call 911 or go to the local ED should there be a change in any of these risk factors.     Appearance:   Appropriate    Eye Contact:   Good    Psychomotor Behavior: Normal    Attitude:   Cooperative    Orientation:   All   Speech    Rate / Production: Hyperverbal     Volume:  Normal    Mood:    Anxious    Affect:    Worrisome    Thought Content:  Rumination    Thought Form:  Logical    Insight:    Good      Medication Review:   No changes to current psychiatric medication(s)     Medication Compliance:   Yes     Changes in Health Issues:   None reported     Chemical Use Review:   Substance Use: Chemical use reviewed, no active concerns identified      Tobacco Use: No  current tobacco use.      Diagnosis:  1. ADHD (attention deficit hyperactivity disorder), inattentive type    2. LYUBOV (generalized anxiety disorder)        Collateral Reports Completed:   Not Applicable    PLAN: (Patient Tasks / Therapist Tasks / Other)  Patient will be mindful of what causes her to leave a task before it is completed. Patient will be mindful of how she is coping with this symptom.  We will discuss next session.         Aline Shabazz, [facility] 3/1/2024    ___________________________________________________________                                              Individual Treatment Plan    Patient's Name: Tressa Jeong  YOB: 1975    Date of Creation: 7/28/2023  Date Treatment Plan Last Reviewed/Revised: 1/19/2024  DSM5 Diagnoses: Attention-Deficit/Hyperactivity Disorder  314.00 (F90.0) Predominantly inattentive presentation or 300.02 (F41.1) Generalized Anxiety Disorder  Psychosocial / Contextual Factors: Patient is currently unemployed. Patient is living with her dad part time. Patient is single.   PROMIS (reviewed every 90 days):The following assessments were completed by patient for this visit:  PROMIS 10-Global Health (only subscores and total score):       11/15/2022    11:18 AM 7/11/2023     4:12 PM 10/13/2023    12:54 AM 10/26/2023    10:36 PM 11/20/2023     2:17 PM 1/19/2024    11:39 AM 2/29/2024    12:43 PM   PROMIS-10 Scores Only   Global Mental Health Score 10 11 10 9 9 10 11   Global Physical Health Score 15 16 16 15 16 17 14   PROMIS TOTAL - SUBSCORES 25 27 26 24 25 27 25        Referral / Collaboration:  Referral to another professional/service is not indicated at this time.    Anticipated number of session for this episode of care:  50  Anticipation frequency of session: Every other week  Anticipated Duration of each session: 53 or more minutes  Treatment plan will be reviewed in 90 days or when goals have been changed.       MeasurableTreatment Goal(s) related to  diagnosis / functional impairment(s)  Goal 1: Patient will work on stabilizing symptoms of ADHD.      Objective #A  Patient will learn about the diagnosis and symptoms of ADHD.   Status: Continued - Date(s): 1/19/2024    Intervention(s)  Therapist will teach about the diagnosis of ADHD.     Objective #B  Patient will practice setting goals and completing them.                         Status: Continued - Date(s): 1/19/2024     Intervention(s)  Therapist will teach SMART goals and how to break tasks down into smaller tasks.     Objective #C  Patient will learn 3 skills to help increase motivation and organization.   Status: Continued - Date(s): 1/19/2024    Intervention(s)  Therapist will assign homework of using coping skills to increase motivation and organizational skills.    Objective #D  Patient will learn about how ADHD can impact social interactions and interpersonal relationships.  Status:Continued - Date(s): 1/19/2024    Intervention(s)  Therapist will teach about how ADHD can impact social interactions and interpersonal relationships.         Goal 2: Client will work on stabilizing anxiety symptoms as evidenced by LYUBOV-7 scores (current is 11) and Self report.  Goal is to reduce by five points.      I will know I've met my goal when I can better manage my anxiety .      Objective #A Client will identify triggers of anxiety and process them in session.    Status: Continued - Date(s): 1/19/2024    Intervention(s)  Therapist will teach emotional recognition/identification by assigning homework to journal with written exercises.    Objective #B  Client will identify initial signs or symptoms of anxiety.    Status: Continued - Date(s): 1/19/2024    Intervention(s)  Therapist will teach emotional regulation skills, such as deep breathing and mindfulness skills.    Objective #C  Client will learn about co-morbidities between ADHD and LYUBOV.   Status: Continued - Date(s): 1/19/2024    Intervention(s)  Therapist will  provide educational materials on symptoms of ADHD and LYUBOV.          Patient has reviewed and agreed to the above plan.      Aline Shabazz, [facility]  July 28, 2023  Reviewed 10/27/2023  Re-reviewed 1/19/2024

## 2024-03-15 ENCOUNTER — VIRTUAL VISIT (OUTPATIENT)
Dept: PSYCHOLOGY | Facility: CLINIC | Age: 49
End: 2024-03-15
Payer: COMMERCIAL

## 2024-03-15 DIAGNOSIS — F41.1 GAD (GENERALIZED ANXIETY DISORDER): ICD-10-CM

## 2024-03-15 DIAGNOSIS — F90.0 ADHD (ATTENTION DEFICIT HYPERACTIVITY DISORDER), INATTENTIVE TYPE: Primary | ICD-10-CM

## 2024-03-15 PROCEDURE — 90837 PSYTX W PT 60 MINUTES: CPT | Mod: 95

## 2024-03-15 NOTE — PROGRESS NOTES
[facility]                                     Progress Note    Patient Name: Tressa Jeong  Date: 3/15/2024      Service Type: Individual      Session Start Time: 10:30 AM  Session End Time: 11:24 AM     Session Length: 53+ min    Session #: 16    Attendees: Client attended alone    Service Modality:  Video Visit:      Provider verified identity through the following two step process.  Patient provided:  Patient is known previously to provider    Telemedicine Visit: The patient's condition can be safely assessed and treated via synchronous audio and visual telemedicine encounter.      Reason for Telemedicine Visit: Patient has requested telehealth visit    Originating Site (Patient Location): Patient's home    Distant Site (Provider Location): Provider Remote Setting- Home Office    Consent:  The patient/guardian has verbally consented to: the potential risks and benefits of telemedicine (video visit) versus in person care; bill my insurance or make self-payment for services provided; and responsibility for payment of non-covered services.     Patient would like the video invitation sent by:  My Chart    Mode of Communication:  Video Conference via [facility]    Distant Location (Provider):  Off-site    As the provider I attest to compliance with applicable laws and regulations related to telemedicine.    DATA  Extended Session (53+ minutes): PROLONGED SERVICE IN THE OUTPATIENT SETTING REQUIRING DIRECT (FACE-TO-FACE) PATIENT CONTACT BEYOND THE USUAL SERVICE:    - Patient's presenting concerns require more intensive intervention than could be completed within the usual service  Interactive Complexity: No  Crisis: No        Progress Since Last Session (Related to Symptoms / Goals / Homework):   Symptoms: No change based on patient self-report.    Homework: Achieved / completed to satisfaction      Episode of Care Goals: Satisfactory progress - ACTION (Actively working towards change); Intervened  by reinforcing change plan / affirming steps taken     Current / Ongoing Stressors and Concerns:   Patient reports she is concerned about ADHD symptoms. Patient reports she is concerned about motivation. Patient reports she is concerned about working. Patient reports she is concerned about task completion.  Patient reports she is concerned about prioritizing tasks. Patient reports her dad is home for the winter. Patient reports concerns about re-entering the workforce. Patient reports she is concerned about her sleep habits.      Treatment Objective(s) Addressed in This Session:   Patient will learn about the diagnosis and symptoms of ADHD.   Patient will learn about how ADHD can impact social interactions and interpersonal relationships.  Client will identify triggers of anxiety and process them in session.    Client will identify initial signs or symptoms of anxiety.      Intervention:   Motivational Interviewing: supported patient in exploring her concerns about her sleep habits.   Validated patient's emotions. Supported patient in exploring what she has tried to help her sleep habits. Supported patient in exploring her experience at her sleep study. Discussed building a sleep routine to help the body get into a rhythm. Discussed how routines can be helpful for individuals with ADHD. Discussed limiting time in her bedroom for non-sleep related activities.     Assessments completed prior to visit:  The following assessments were completed by patient for this visit:  PHQ9:       4/7/2016    11:17 AM 9/13/2022     8:51 AM 11/15/2022    11:10 AM 7/11/2023     3:49 PM 10/27/2023    11:00 AM 12/8/2023    10:45 AM 1/19/2024    11:00 AM   PHQ-9 SCORE   PHQ-9 Total Score MyChart  17 (Moderately severe depression)  4 (Minimal depression)  10 (Moderate depression)    PHQ-9 Total Score 12 17 15 4 15 10 9     GAD7:       8/30/2023     1:00 PM 9/29/2023    11:00 AM 10/27/2023    11:00 AM 11/20/2023     2:00 PM 1/5/2024     11:00 AM 1/19/2024    11:00 AM 3/1/2024    11:00 AM   LYUBOV-7 SCORE   Total Score 5 7 9 12 9 11 7     PROMIS 10-Global Health (only subscores and total score):       11/15/2022    11:18 AM 7/11/2023     4:12 PM 10/13/2023    12:54 AM 10/26/2023    10:36 PM 11/20/2023     2:17 PM 1/19/2024    11:39 AM 2/29/2024    12:43 PM   PROMIS-10 Scores Only   Global Mental Health Score 10 11 10 9 9 10 11   Global Physical Health Score 15 16 16 15 16 17 14   PROMIS TOTAL - SUBSCORES 25 27 26 24 25 27 25         ASSESSMENT: Current Emotional / Mental Status (status of significant symptoms):   Risk status (Self / Other harm or suicidal ideation)   Patient denies current fears or concerns for personal safety.   Patient denies current or recent suicidal ideation or behaviors.   Patient denies current or recent homicidal ideation or behaviors.   Patient denies current or recent self injurious behavior or ideation.   Patient denies other safety concerns.   Patient reports there has been no change in risk factors since their last session.     Patient reports there has been no change in protective factors since their last session.     Recommended that patient call 911 or go to the local ED should there be a change in any of these risk factors.     Appearance:   Appropriate    Eye Contact:   Good    Psychomotor Behavior: Normal    Attitude:   Cooperative    Orientation:   All   Speech    Rate / Production: Hyperverbal     Volume:  Normal    Mood:    Anxious    Affect:    Worrisome    Thought Content:  Rumination    Thought Form:  Logical    Insight:    Good      Medication Review:   No changes to current psychiatric medication(s)     Medication Compliance:   Yes     Changes in Health Issues:   None reported     Chemical Use Review:   Substance Use: Chemical use reviewed, no active concerns identified      Tobacco Use: No current tobacco use.      Diagnosis:  1. ADHD (attention deficit hyperactivity disorder), inattentive type    2. LYUBOV  (generalized anxiety disorder)        Collateral Reports Completed:   Not Applicable    PLAN: (Patient Tasks / Therapist Tasks / Other)  Patient will create and adhere to a bed time routine.  We will discuss next session.         Aline Shabazz, [facility] 3/15/2024    ___________________________________________________________                                              Individual Treatment Plan    Patient's Name: Tressa Jeong  YOB: 1975    Date of Creation: 7/28/2023  Date Treatment Plan Last Reviewed/Revised: 1/19/2024  DSM5 Diagnoses: Attention-Deficit/Hyperactivity Disorder  314.00 (F90.0) Predominantly inattentive presentation or 300.02 (F41.1) Generalized Anxiety Disorder  Psychosocial / Contextual Factors: Patient is currently unemployed. Patient is living with her dad part time. Patient is single.   PROMIS (reviewed every 90 days):The following assessments were completed by patient for this visit:  PROMIS 10-Global Health (only subscores and total score):       11/15/2022    11:18 AM 7/11/2023     4:12 PM 10/13/2023    12:54 AM 10/26/2023    10:36 PM 11/20/2023     2:17 PM 1/19/2024    11:39 AM 2/29/2024    12:43 PM   PROMIS-10 Scores Only   Global Mental Health Score 10 11 10 9 9 10 11   Global Physical Health Score 15 16 16 15 16 17 14   PROMIS TOTAL - SUBSCORES 25 27 26 24 25 27 25        Referral / Collaboration:  Referral to another professional/service is not indicated at this time.    Anticipated number of session for this episode of care:  50  Anticipation frequency of session: Every other week  Anticipated Duration of each session: 53 or more minutes  Treatment plan will be reviewed in 90 days or when goals have been changed.       MeasurableTreatment Goal(s) related to diagnosis / functional impairment(s)  Goal 1: Patient will work on stabilizing symptoms of ADHD.      Objective #A  Patient will learn about the diagnosis and symptoms of ADHD.   Status: Continued - Date(s):  1/19/2024    Intervention(s)  Therapist will teach about the diagnosis of ADHD.     Objective #B  Patient will practice setting goals and completing them.                         Status: Continued - Date(s): 1/19/2024     Intervention(s)  Therapist will teach SMART goals and how to break tasks down into smaller tasks.     Objective #C  Patient will learn 3 skills to help increase motivation and organization.   Status: Continued - Date(s): 1/19/2024    Intervention(s)  Therapist will assign homework of using coping skills to increase motivation and organizational skills.    Objective #D  Patient will learn about how ADHD can impact social interactions and interpersonal relationships.  Status:Continued - Date(s): 1/19/2024    Intervention(s)  Therapist will teach about how ADHD can impact social interactions and interpersonal relationships.         Goal 2: Client will work on stabilizing anxiety symptoms as evidenced by LYUBOV-7 scores (current is 11) and Self report.  Goal is to reduce by five points.      I will know I've met my goal when I can better manage my anxiety .      Objective #A Client will identify triggers of anxiety and process them in session.    Status: Continued - Date(s): 1/19/2024    Intervention(s)  Therapist will teach emotional recognition/identification by assigning homework to journal with written exercises.    Objective #B  Client will identify initial signs or symptoms of anxiety.    Status: Continued - Date(s): 1/19/2024    Intervention(s)  Therapist will teach emotional regulation skills, such as deep breathing and mindfulness skills.    Objective #C  Client will learn about co-morbidities between ADHD and LYUBOV.   Status: Continued - Date(s): 1/19/2024    Intervention(s)  Therapist will provide educational materials on symptoms of ADHD and LYUBOV.          Patient has reviewed and agreed to the above plan.      Aline Shabazz, [facility]  July 28, 2023  Reviewed 10/27/2023  Re-reviewed 1/19/2024

## 2024-04-04 ENCOUNTER — VIRTUAL VISIT (OUTPATIENT)
Dept: PSYCHOLOGY | Facility: CLINIC | Age: 49
End: 2024-04-04
Payer: COMMERCIAL

## 2024-04-04 DIAGNOSIS — F41.1 GAD (GENERALIZED ANXIETY DISORDER): ICD-10-CM

## 2024-04-04 DIAGNOSIS — F90.0 ADHD (ATTENTION DEFICIT HYPERACTIVITY DISORDER), INATTENTIVE TYPE: Primary | ICD-10-CM

## 2024-04-04 PROCEDURE — 90837 PSYTX W PT 60 MINUTES: CPT | Mod: 95

## 2024-04-04 ASSESSMENT — ANXIETY QUESTIONNAIRES
IF YOU CHECKED OFF ANY PROBLEMS ON THIS QUESTIONNAIRE, HOW DIFFICULT HAVE THESE PROBLEMS MADE IT FOR YOU TO DO YOUR WORK, TAKE CARE OF THINGS AT HOME, OR GET ALONG WITH OTHER PEOPLE: SOMEWHAT DIFFICULT
GAD7 TOTAL SCORE: 7
2. NOT BEING ABLE TO STOP OR CONTROL WORRYING: NOT AT ALL
1. FEELING NERVOUS, ANXIOUS, OR ON EDGE: SEVERAL DAYS
4. TROUBLE RELAXING: NEARLY EVERY DAY
6. BECOMING EASILY ANNOYED OR IRRITABLE: SEVERAL DAYS
5. BEING SO RESTLESS THAT IT IS HARD TO SIT STILL: SEVERAL DAYS
GAD7 TOTAL SCORE: 7
3. WORRYING TOO MUCH ABOUT DIFFERENT THINGS: SEVERAL DAYS
7. FEELING AFRAID AS IF SOMETHING AWFUL MIGHT HAPPEN: NOT AT ALL

## 2024-04-04 ASSESSMENT — COLUMBIA-SUICIDE SEVERITY RATING SCALE - C-SSRS
1. SINCE LAST CONTACT, HAVE YOU WISHED YOU WERE DEAD OR WISHED YOU COULD GO TO SLEEP AND NOT WAKE UP?: NO
6. HAVE YOU EVER DONE ANYTHING, STARTED TO DO ANYTHING, OR PREPARED TO DO ANYTHING TO END YOUR LIFE?: NO
SUICIDE, SINCE LAST CONTACT: NO
TOTAL  NUMBER OF INTERRUPTED ATTEMPTS SINCE LAST CONTACT: NO
ATTEMPT SINCE LAST CONTACT: NO
TOTAL  NUMBER OF ABORTED OR SELF INTERRUPTED ATTEMPTS SINCE LAST CONTACT: NO
2. HAVE YOU ACTUALLY HAD ANY THOUGHTS OF KILLING YOURSELF?: NO

## 2024-04-04 ASSESSMENT — PATIENT HEALTH QUESTIONNAIRE - PHQ9: SUM OF ALL RESPONSES TO PHQ QUESTIONS 1-9: 11

## 2024-04-04 NOTE — PROGRESS NOTES
[facility]                                     Progress Note    Patient Name: Tressa Jeong  Date: 4/4/2024      Service Type: Individual      Session Start Time: 12:28 PM  Session End Time: 1:27 PM     Session Length: 53+ min    Session #: 17    Attendees: Client attended alone    Service Modality:  Video Visit:      Provider verified identity through the following two step process.  Patient provided:  Patient is known previously to provider    Telemedicine Visit: The patient's condition can be safely assessed and treated via synchronous audio and visual telemedicine encounter.      Reason for Telemedicine Visit: Patient has requested telehealth visit    Originating Site (Patient Location): Patient's home    Distant Site (Provider Location): Provider Remote Setting- Home Office    Consent:  The patient/guardian has verbally consented to: the potential risks and benefits of telemedicine (video visit) versus in person care; bill my insurance or make self-payment for services provided; and responsibility for payment of non-covered services.     Patient would like the video invitation sent by:  My Chart    Mode of Communication:  Video Conference via [facility]    Distant Location (Provider):  Off-site    As the provider I attest to compliance with applicable laws and regulations related to telemedicine.    DATA  Extended Session (53+ minutes): PROLONGED SERVICE IN THE OUTPATIENT SETTING REQUIRING DIRECT (FACE-TO-FACE) PATIENT CONTACT BEYOND THE USUAL SERVICE:    - Patient's presenting concerns require more intensive intervention than could be completed within the usual service  Interactive Complexity: No  Crisis: No        Progress Since Last Session (Related to Symptoms / Goals / Homework):   Symptoms: Worsening symptoms of depression and no change in symptoms of anxiety based PHQ-9 and LYUBOV-7 scores and patient self-report.    Homework: Achieved / completed to satisfaction      Episode of Care  Goals: Satisfactory progress - ACTION (Actively working towards change); Intervened by reinforcing change plan / affirming steps taken     Current / Ongoing Stressors and Concerns:   Patient reports she is concerned about ADHD symptoms. Patient reports she is concerned about motivation. Patient reports she is concerned about working. Patient reports she is concerned about task completion.  Patient reports she is concerned about prioritizing tasks. Patient reports her dad is home for the winter. Patient reports concerns about re-entering the workforce. Patient reports she is concerned about her sleep habits. Patient reports she is concerned about productivity.      Treatment Objective(s) Addressed in This Session:   Patient will learn about the diagnosis and symptoms of ADHD.   Patient will learn about how ADHD can impact social interactions and interpersonal relationships.  Client will identify triggers of anxiety and process them in session.    Client will identify initial signs or symptoms of anxiety.      Intervention:   Motivational Interviewing: supported patient in exploring her concerns about her sleep habits.   Validated patient's emotions. Supported patient in exploring how sleep impacts her productivity, motivation, and task completion. Supported patient in exploring her task completion since having her dad home. Supported patient in exploring how external pressures are not helpful for some task completion. Supported patient in exploring her plan for completing tasks after she has some alone time when her dad leaves. Discussed being mindful of how this time impacts her productivity and task completion.    Reviewed treatment plan and goals with patient.     Assessments completed prior to visit:  The following assessments were completed by patient for this visit:  PHQ9:       9/13/2022     8:51 AM 11/15/2022    11:10 AM 7/11/2023     3:49 PM 10/27/2023    11:00 AM 12/8/2023    10:45 AM 1/19/2024    11:00 AM  4/4/2024    12:00 PM   PHQ-9 SCORE   PHQ-9 Total Score MyChart 17 (Moderately severe depression)  4 (Minimal depression)  10 (Moderate depression)     PHQ-9 Total Score 17 15 4 15 10 9 11     GAD7:       9/29/2023    11:00 AM 10/27/2023    11:00 AM 11/20/2023     2:00 PM 1/5/2024    11:00 AM 1/19/2024    11:00 AM 3/1/2024    11:00 AM 4/4/2024    12:00 PM   LYUBOV-7 SCORE   Total Score 7 9 12 9 11 7 7     PROMIS 10-Global Health (only subscores and total score):       11/15/2022    11:18 AM 7/11/2023     4:12 PM 10/13/2023    12:54 AM 10/26/2023    10:36 PM 11/20/2023     2:17 PM 1/19/2024    11:39 AM 2/29/2024    12:43 PM   PROMIS-10 Scores Only   Global Mental Health Score 10 11 10 9 9 10 11   Global Physical Health Score 15 16 16 15 16 17 14   PROMIS TOTAL - SUBSCORES 25 27 26 24 25 27 25         ASSESSMENT: Current Emotional / Mental Status (status of significant symptoms):   Risk status (Self / Other harm or suicidal ideation)   Patient denies current fears or concerns for personal safety.   Patient denies current or recent suicidal ideation or behaviors.   Patient denies current or recent homicidal ideation or behaviors.   Patient denies current or recent self injurious behavior or ideation.   Patient denies other safety concerns.   Patient reports there has been no change in risk factors since their last session.     Patient reports there has been no change in protective factors since their last session.     Recommended that patient call 911 or go to the local ED should there be a change in any of these risk factors.     Appearance:   Appropriate    Eye Contact:   Good    Psychomotor Behavior: Normal    Attitude:   Cooperative    Orientation:   All   Speech    Rate / Production: Hyperverbal     Volume:  Normal    Mood:    Anxious    Affect:    Worrisome    Thought Content:  Rumination    Thought Form:  Logical    Insight:    Good      Medication Review:   No changes to current psychiatric  medication(s)     Medication Compliance:   Yes     Changes in Health Issues:   None reported     Chemical Use Review:   Substance Use: Chemical use reviewed, no active concerns identified      Tobacco Use: No current tobacco use.      Diagnosis:  1. ADHD (attention deficit hyperactivity disorder), inattentive type    2. LYUBOV (generalized anxiety disorder)        Collateral Reports Completed:   Not Applicable    PLAN: (Patient Tasks / Therapist Tasks / Other)  Patient will be mindful of productivity levels after having alone time. Patient will be aware of how having a plan helps increase productivity.  We will discuss next session.         Aline Jimenez, [facility] 4/4/2024    ___________________________________________________________                                              Individual Treatment Plan    Patient's Name: Tressa Jeong  YOB: 1975    Date of Creation: 7/28/2023  Date Treatment Plan Last Reviewed/Revised: 4/4/2024  DSM5 Diagnoses: Attention-Deficit/Hyperactivity Disorder  314.00 (F90.0) Predominantly inattentive presentation or 300.02 (F41.1) Generalized Anxiety Disorder  Psychosocial / Contextual Factors: Patient is currently unemployed. Patient is living with her dad part time. Patient is single.   PROMIS (reviewed every 90 days):The following assessments were completed by patient for this visit:  PROMIS 10-Global Health (only subscores and total score):       11/15/2022    11:18 AM 7/11/2023     4:12 PM 10/13/2023    12:54 AM 10/26/2023    10:36 PM 11/20/2023     2:17 PM 1/19/2024    11:39 AM 2/29/2024    12:43 PM   PROMIS-10 Scores Only   Global Mental Health Score 10 11 10 9 9 10 11   Global Physical Health Score 15 16 16 15 16 17 14   PROMIS TOTAL - SUBSCORES 25 27 26 24 25 27 25        Referral / Collaboration:  Referral to another professional/service is not indicated at this time.    Anticipated number of session for this episode of care:  50  Anticipation frequency of session: Every  other week  Anticipated Duration of each session: 53 or more minutes  Treatment plan will be reviewed in 90 days or when goals have been changed. There has been demonstrated improvement in functioning while patient has been engaged in psychotherapy/psychological service- if withdrawn the patient would deteriorate and/or relapse.     MeasurableTreatment Goal(s) related to diagnosis / functional impairment(s)  Goal 1: Patient will work on stabilizing symptoms of ADHD.      Objective #A  Patient will learn about the diagnosis and symptoms of ADHD.   Status: Continued - Date(s): 4/4/2024    Intervention(s)  Therapist will teach about the diagnosis of ADHD.     Objective #B  Patient will practice setting goals and completing them.                         Status: Continued - Date(s): 4/4/2024     Intervention(s)  Therapist will teach SMART goals and how to break tasks down into smaller tasks.     Objective #C  Patient will learn 3 skills to help increase motivation and organization.   Status: Continued - Date(s): 4/4/2024    Intervention(s)  Therapist will assign homework of using coping skills to increase motivation and organizational skills.    Objective #D  Patient will learn about how ADHD can impact social interactions and interpersonal relationships.  Status:Continued - Date(s): 4/4/2024    Intervention(s)  Therapist will teach about how ADHD can impact social interactions and interpersonal relationships.         Goal 2: Client will work on stabilizing anxiety symptoms as evidenced by LYUBOV-7 scores (current is 7) and Self report.  Goal is to reduce by five points.      I will know I've met my goal when I can better manage my anxiety .      Objective #A Client will identify triggers of anxiety and process them in session.    Status: Continued - Date(s): 4/4/2024    Intervention(s)  Therapist will teach emotional recognition/identification by assigning homework to journal with written exercises.    Objective #B  Client  will identify initial signs or symptoms of anxiety.    Status: Continued - Date(s): 4/4/2024    Intervention(s)  Therapist will teach emotional regulation skills, such as deep breathing and mindfulness skills.    Objective #C  Client will learn about co-morbidities between ADHD and LYUBOV.   Status: Continued - Date(s): 4/4/2024    Intervention(s)  Therapist will provide educational materials on symptoms of ADHD and LYUBOV.          Patient has reviewed and agreed to the above plan.      TYE Marie  July 28, 2023  Reviewed 10/27/2023  Re-reviewed 1/19/2024 Re-reviewed 4/4/2024

## 2024-04-09 ENCOUNTER — OFFICE VISIT (OUTPATIENT)
Dept: FAMILY MEDICINE | Facility: CLINIC | Age: 49
End: 2024-04-09
Payer: COMMERCIAL

## 2024-04-09 VITALS
BODY MASS INDEX: 32.49 KG/M2 | TEMPERATURE: 97.3 F | WEIGHT: 195 LBS | HEIGHT: 65 IN | RESPIRATION RATE: 18 BRPM | SYSTOLIC BLOOD PRESSURE: 111 MMHG | HEART RATE: 87 BPM | OXYGEN SATURATION: 99 % | DIASTOLIC BLOOD PRESSURE: 75 MMHG

## 2024-04-09 DIAGNOSIS — Z00.00 ADULT GENERAL MEDICAL EXAM: Primary | ICD-10-CM

## 2024-04-09 DIAGNOSIS — N95.1 PERIMENOPAUSE: ICD-10-CM

## 2024-04-09 DIAGNOSIS — Z12.31 ENCOUNTER FOR SCREENING MAMMOGRAM FOR BREAST CANCER: ICD-10-CM

## 2024-04-09 DIAGNOSIS — R41.840 IMPAIRED CONCENTRATION: ICD-10-CM

## 2024-04-09 DIAGNOSIS — N39.46 MIXED STRESS AND URGE URINARY INCONTINENCE: ICD-10-CM

## 2024-04-09 PROCEDURE — 99396 PREV VISIT EST AGE 40-64: CPT | Performed by: FAMILY MEDICINE

## 2024-04-09 SDOH — HEALTH STABILITY: PHYSICAL HEALTH: ON AVERAGE, HOW MANY DAYS PER WEEK DO YOU ENGAGE IN MODERATE TO STRENUOUS EXERCISE (LIKE A BRISK WALK)?: 5 DAYS

## 2024-04-09 ASSESSMENT — SOCIAL DETERMINANTS OF HEALTH (SDOH): HOW OFTEN DO YOU GET TOGETHER WITH FRIENDS OR RELATIVES?: ONCE A WEEK

## 2024-04-09 NOTE — PROGRESS NOTES
"Preventive Care Visit  [facility]  RAAD ROSALINA TRAMMELL MD, Family Medicine  Apr 9, 2024      1. Adult general medical exam  49 yo female here for physical exam - prefers to return, fasting, for labs.   - Comprehensive metabolic panel (BMP + Alb, Alk Phos, ALT, AST, Total. Bili, TP); Future  - CBC with platelets; Future  - Lipid Profile (Chol, Trig, HDL, LDL calc); Future    2. Mixed stress and urge urinary incontinence  H/o mixed stress and urge urinary incontinence - wants physical therapy to address this - consider UA/UC.  Will refer to Urology as they can address some of this with therapy designed specifically for bladder  - UA with Microscopic - lab collect; Future  - Urine Culture Aerobic Bacterial - lab collect; Future  - Adult Urology  Referral; Future    3. Perimenopause  Perimenopausal symptoms -   - Adult Urology  Referral; Future  - Follicle stimulating hormone; Future    4. Impaired concentration  H/o impaired concentration, mild decreased mood -     5. Encounter for screening mammogram for breast cancer  Due for breast cancer screening - ordered mammogram   - *MA Screening Digital Bilateral; Future        Will come back , fasting, for labs       Subjective   Tressa is a 48 year old, presenting for the following:  Physical (Pt is here for physical, pt is wanting to discuss about going thru the change.)        4/9/2024     3:04 PM   Additional Questions   Roomed by Jocelyne   Accompanied by self        Health Care Directive  Patient does not have a Health Care Directive or Living Will: no written documents     Went to sleep study - has sleep apnea - not super bad = \"mild\"  Working with 2 doctors - they don't agree  One thinks she has circadian rhythm disorder - one doesn't think that  Taking Melatonin   Wearing a backpack to bed - only has obstructive symptoms if on back    Not working currently   Was trying to work a slightly adjusted shift 10-6  Last " "job, was able to be a little flexible  2nd shift would be a better fit -     Severe ADHD - seeing therapist twice a month -   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 -   Tried Concerta - no effect   Improvement in focus -   Wellbutrin really helped her fatigue -   Medication manager is really conservative -     Perimenopause - last year, periods every other month   Has to void all the time   Having some bladder leakage -   Wonders about a physical therapist -   Doesn't want ot wear Poise    Mo was having issues around this same time, but did pretty well    Patient 's periods have always been very heavy - \"disaster of gargantuan proportions\"  Took hormonal birth control in 30s - hated it   Not sexually active -     On Sertraline, Bupropion  Hasn't taken Clonazepam since on Sertraline  Bupropion helps her sleep    Got COVID at [facility]  Got Paxlovid on the internet    Hasn't had flu vaccine - needs COVID vaccine -             4/9/2024   General Health   How would you rate your overall physical health? Good   Feel stress (tense, anxious, or unable to sleep) Rather much   (!) STRESS CONCERN      4/9/2024   Nutrition   Three or more servings of calcium each day? Yes   Diet: Vegetarian/vegan   How many servings of fruit and vegetables per day? (!) 2-3   How many sweetened beverages each day? 0-1         4/9/2024   Exercise   Days per week of moderate/strenous exercise 5 days         4/9/2024   Social Factors   Frequency of gathering with friends or relatives Once a week   Worry food won't last until get money to buy more No   Food not last or not have enough money for food? Yes   Do you have housing?  Yes   Are you worried about losing your housing? No   Lack of transportation? No   Unable to get utilities (heat,electricity)? No   (!) FOOD SECURITY CONCERN PRESENT      4/9/2024   Dental   Dentist two times every year? Yes         4/9/2024   TB Screening   Were you born outside of the [place]? No "           Today's PHQ-2 Score:       4/18/2024    12:53 PM   PHQ-2 ( 1999 Pfizer)   Q1: Little interest or pleasure in doing things 1   Q2: Feeling down, depressed or hopeless 0   PHQ-2 Score 1   Q1: Little interest or pleasure in doing things Several days   Q2: Feeling down, depressed or hopeless Not at all   PHQ-2 Score 1         4/9/2024   Substance Use   Alcohol more than 3/day or more than 7/wk No   Do you use any other substances recreationally? No     Social History     Tobacco Use    Smoking status: Former     Packs/day: 0.50     Years: 10.00     Additional pack years: 0.00     Total pack years: 5.00     Types: Cigarettes    Smokeless tobacco: Never   Vaping Use    Vaping Use: Never used   Substance Use Topics    Alcohol use: No    Drug use: Not Currently     Types: Marijuana          Mammogram Screening - Mammogram every 1-2 years updated in Health Maintenance based on mutual decision making        4/9/2024   STI Screening   New sexual partner(s) since last STI/HIV test? No     History of abnormal Pap smear: NO - age 30-65 PAP every 5 years with negative HPV co-testing recommended        Latest Ref Rng & Units 1/18/2023     5:01 PM 9/24/2007    12:00 AM   PAP / HPV   PAP  Negative for Intraepithelial Lesion or Malignancy (NILM)     PAP (Historical)   NIL    HPV 16 DNA Negative Negative     HPV 18 DNA Negative Negative     Other HR HPV Negative Negative       ASCVD Risk   The ASCVD Risk score (Bossman THAKUR, et al., 2019) failed to calculate for the following reasons:    Cannot find a previous HDL lab    Cannot find a previous total cholesterol lab        4/9/2024   Contraception/Family Planning   Questions about contraception or family planning No        Reviewed and updated as needed this visit by Provider                    Past Medical History:   Diagnosis Date    Depressive disorder      Past Surgical History:   Procedure Laterality Date    COLONOSCOPY N/A 9/8/2023    Procedure: COLONOSCOPY, WITH  POLYPECTOMY;  Surgeon: Mk Garay MD;  Location: [facility] [place] OR    ORTHOPEDIC SURGERY       OB History   No obstetric history on file.     BP Readings from Last 3 Encounters:   04/09/24 111/75   02/13/24 121/79   09/22/23 127/84    Wt Readings from Last 3 Encounters:   04/09/24 88.5 kg (195 lb)   02/13/24 89.1 kg (196 lb 8 oz)   09/22/23 89.8 kg (198 lb)                  Patient Active Problem List   Diagnosis    NO ACTIVE PROBLEMS    Hx of abnormal cervical Pap smear     Past Surgical History:   Procedure Laterality Date    COLONOSCOPY N/A 9/8/2023    Procedure: COLONOSCOPY, WITH POLYPECTOMY;  Surgeon: Mk Garay MD;  Location: [facility] [place] OR    ORTHOPEDIC SURGERY         Social History     Tobacco Use    Smoking status: Former     Current packs/day: 0.50     Average packs/day: 0.5 packs/day for 10.0 years (5.0 ttl pk-yrs)     Types: Cigarettes     Passive exposure: Never    Smokeless tobacco: Never   Substance Use Topics    Alcohol use: No     Family History   Problem Relation Age of Onset    Skin Cancer Mother     Bipolar Disorder Mother     Hypertension Mother     Psychotic Disorder Mother     Suicide Maternal Uncle     Psychotic Disorder Maternal Uncle         x4    Suicide Maternal Uncle     Psychotic Disorder Paternal Uncle         x1    Glaucoma No family hx of          Current Outpatient Medications   Medication Sig Dispense Refill    amphetamine-dextroamphetamine (ADDERALL) 15 MG tablet Take 15 mg by mouth daily      betamethasone valerate (VALISONE) 0.1 % external ointment Apply topically 2 times daily Apply twice daily as needed for bug bites. 45 g 2    buPROPion (WELLBUTRIN XL) 150 MG 24 hr tablet Take 1 tablet every day by oral route.*      clonazePAM (KLONOPIN) 0.5 MG tablet Take 0.5 mg by mouth daily as needed      fluocinonide (LIDEX) 0.05 % external ointment Apply twice daily as needed for bug bites on trunk or extremities 60 g 2    sertraline (ZOLOFT) 50 MG tablet Take 50 mg by mouth daily      SF 5000  "PLUS 1.1 % CREA BRUSH WITH A SMALL AMOUNT 2 TIMES PER DAY. DO NOT SWALLOW. DO NOT EAT, DRINK OR RINSE FOR 30 MINUTES AFTERWARD.*       Allergies   Allergen Reactions    Latex     Morphine      Not life treated, burning and painful when administ       Review of Systems   Constitutional:  Negative for chills and fever.   HENT:  Negative for ear pain and sore throat.    Eyes:  Negative for pain and visual disturbance.   Respiratory:  Negative for cough and shortness of breath.    Cardiovascular:  Negative for chest pain and palpitations.   Gastrointestinal:  Negative for abdominal pain and vomiting.   Genitourinary:  Negative for dysuria and hematuria.        Urinary incontinence  Irregular, heavy periods   Musculoskeletal:  Negative for arthralgias and back pain.   Skin:  Negative for color change and rash.   Neurological:  Negative for seizures and syncope.   Psychiatric/Behavioral:  Positive for decreased concentration and dysphoric mood.    All other systems reviewed and are negative.         Objective    Exam  /75 (BP Location: Left arm, Patient Position: Sitting, Cuff Size: Adult Regular)   Pulse 87   Temp 97.3  F (36.3  C) (Temporal)   Resp 18   Ht 1.64 m (5' 4.57\")   Wt 88.5 kg (195 lb)   LMP 03/27/2024 (Exact Date)   SpO2 99%   BMI 32.89 kg/m     Estimated body mass index is 32.89 kg/m  as calculated from the following:    Height as of this encounter: 1.64 m (5' 4.57\").    Weight as of this encounter: 88.5 kg (195 lb).    Physical Exam  Vitals reviewed.   Constitutional:       General: She is not in acute distress.     Appearance: Normal appearance.   HENT:      Head: Normocephalic.      Right Ear: Tympanic membrane, ear canal and external ear normal.      Left Ear: Tympanic membrane, ear canal and external ear normal.      Nose: Nose normal.      Mouth/Throat:      Mouth: Mucous membranes are moist.      Pharynx: No posterior oropharyngeal erythema.   Eyes:      Extraocular Movements: " Extraocular movements intact.      Conjunctiva/sclera: Conjunctivae normal.      Pupils: Pupils are equal, round, and reactive to light.   Cardiovascular:      Rate and Rhythm: Normal rate and regular rhythm.      Pulses: Normal pulses.      Heart sounds: Normal heart sounds. No murmur heard.  Pulmonary:      Effort: Pulmonary effort is normal.      Breath sounds: Normal breath sounds.   Abdominal:      Palpations: Abdomen is soft. There is no mass.      Tenderness: There is no abdominal tenderness. There is no guarding or rebound.   Musculoskeletal:         General: No deformity. Normal range of motion.      Cervical back: Normal range of motion and neck supple.   Lymphadenopathy:      Cervical: No cervical adenopathy.   Skin:     General: Skin is warm and dry.   Neurological:      General: No focal deficit present.      Mental Status: She is alert.   Psychiatric:         Mood and Affect: Mood normal.         Behavior: Behavior normal.       No results found for any visits on 04/09/24.          Signed Electronically by: RAAD TRAMMELL MD

## 2024-04-09 NOTE — COMMUNITY RESOURCES LIST (ENGLISH)
April 9, 2024           YOUR PERSONALIZED LIST OF SERVICES & PROGRAMS           NAVIGATION    Eligibility Screening      [employer] - Navigators  Phone: [phone]  Website: https://www.[employer]org/about-us/assister-program/navigators/index.jsp  Language: English  Hours: Mon 8:00 AM - 4:00 PM Tue 8:00 AM - 4:00 PM Wed 8:00 AM - 4:00 PM Thu 8:00 AM - 4:00 PM      [employer] - Certified Application Counselor (CAC)  Phone: [phone]  Website: https://www.[employer]org/about-us/assister-program/cacs/index.jsp  Language: English  Hours: Mon 8:00 AM - 4:00 PM Tue 8:00 AM - 4:00 PM Wed 8:00 AM - 4:00 PM Thu 8:00 AM - 4:00 PM      [employer] [place] - SNAP (formerly food stamps) Screening and Application help  Phone: [phone]  Website: https://www.[employer].org/programs/[place]-food-helpline/  Language: English  Hours: Mon 10:00 AM - 5:00 PM Tue 10:00 AM - 5:00 PM Wed 10:00 AM - 5:00 PM Thu 10:00 AM - 5:00 PM Fri 10:00 AM - 5:00 PM  Fee: Free  Accessibility: Ada accessible, Blind accommodation, Deaf or hard of hearing, Translation services        ASSISTANCE    Nutrition Benefits      [facility] - Care Coordination (Healthcare only)  Phone: [phone] [phone]  Website: https://[employer].org  Language: English, Singaporean  Hours: Wed 9:00 AM - 11:30 AM Thu 1:00 PM - 4:00 PM, 5:30 PM - 7:00 PM      [employer] [place] - SNAP (formerly food stamps) Screening and Application help  Phone: (801) 897-5882  Website: https://www.[employer].org/programs/[place]-food-helpline/  Language: English  Hours: Mon 10:00 AM - 5:00 PM Tue 10:00 AM - 5:00 PM Wed 10:00 AM - 5:00 PM Thu 10:00 AM - 5:00 PM Fri 10:00 AM - 5:00 PM  Fee: Free  Accessibility: Ada accessible, Blind accommodation, Deaf or hard of hearing, Translation services      [employer] [place] - [place]  Phone: (319) [phone]  Website: https://www.[employer]org/programs/market-[place]ul/  Language: English  Hours: Mon 10:00 AM  - 5:00 PM Tue 10:00 AM - 5:00 PM Wed 10:00 AM - 5:00 PM Thu 10:00 AM - 5:00 PM Fri 10:00 AM - 5:00 PM  Fee: Self pay    Pantry      [facility] - Food pantry  [address] [place] [address] (Distance: 1.8 miles)  Phone: [phone]) [phone]  Language: English, Hebrew, Welsh, Khmer, Brazilian  Fee: Free  Accessibility: Ada accessible, Blind accommodation, Deaf or hard of hearing      in the [place] - Food in the [place] at [facility]  [address] [place] [address] (Distance: 10.2 miles)  Phone: [phone]) 100-8776  Website: https://www.[employer].org/our-programs/feeding-the-future/food-in-the-[place]/  Language: English  Fee: Free  Accessibility: [place] accessible      [employer] [place]  Phone: (181) 895-5992  Website: https://www.[employer]/empowerment-food-bank  Language: English  Hours: Mon 9:00 AM - 5:00 PM Tue 9:00 AM - 5:00 PM Wed 9:00 AM - 5:00 PM Thu 9:00 AM - 5:00 PM Fri 9:00 AM - 5:00 PM  Fee: Free               IMPORTANT NUMBERS & WEBSITES        Emergency Services  911  .   [facility]  211 http://211unitedway.org  .   Poison Control  (717) 586-7679 http://[place]poison.org http://wis[place]poison.org  .     Suicide and Crisis Lifeline  988 http://988lifeline.org  .   Childhelp National Child Abuse Hotline  627.241.5180 http://Childhelphotline.org   .   National Sexual Assault Hotline  (709) 682-2374 (HOPE) http://Rainn.org   .     National Runaway Safeline  (835) 769-9534 (RUNAWAY) http://MDxHealthruRate Solutions.org  .   Pregnancy & Postpartum Support  Call/text 661-769-3519  [place]: http://ppsupportmn.org  [place]: http://psichapters.com/wi  .   Substance Abuse National Helpline (SAMHSA)  800-622-HELP (4082) http://Findtreatment.gov   .                DISCLAIMER: These resources have been generated via the [employer] Platform. [employer] does not endorse any service providers mentioned in this resource list. [employer] does not guarantee that the services  mentioned in this resource list will be available to you or will improve your health or wellness.    [facility]

## 2024-04-16 ENCOUNTER — TELEPHONE (OUTPATIENT)
Dept: FAMILY MEDICINE | Facility: CLINIC | Age: 49
End: 2024-04-16

## 2024-04-16 ENCOUNTER — VIRTUAL VISIT (OUTPATIENT)
Dept: FAMILY MEDICINE | Facility: CLINIC | Age: 49
End: 2024-04-16
Payer: COMMERCIAL
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
## 2024-04-16 DIAGNOSIS — S30.861A TICK BITE OF ABDOMINAL WALL, INITIAL ENCOUNTER: Primary | ICD-10-CM

## 2024-04-16 PROCEDURE — 99213 OFFICE O/P EST LOW 20 MIN: CPT | Mod: 95 | Performed by: FAMILY MEDICINE

## 2024-04-16 RX ORDER — DOXYCYCLINE HYCLATE 200 MG/1
200 TABLET, DELAYED RELEASE ORAL ONCE
Qty: 1 TABLET | Refills: 0 | Status: SHIPPED | OUTPATIENT
Start: 2024-04-16 | End: 2024-04-16

## 2024-04-16 NOTE — PROGRESS NOTES
"Tressa is a 48 year old who is being evaluated via a billable video visit.    How would you like to obtain your AVS? MyChart  If the video visit is dropped, the invitation should be resent by: Text to cell phone: [phone]  Will anyone else be joining your video visit? No      Assessment & Plan     Tick bite of abdominal wall, initial encounter  Meets criteria for prophylactic doxycycline   - Doxycycline Hyclate 200 MG TBEC; Take 200 mg by mouth once for 1 dose      20 minutes spent by me on the date of the encounter doing chart review, history and exam, documentation and further activities per the note      BMI  Estimated body mass index is 32.89 kg/m  as calculated from the following:    Height as of 4/9/24: 1.64 m (5' 4.57\").    Weight as of 4/9/24: 88.5 kg (195 lb).           Subjective   Tressa is a 48 year old, presenting for the following health issues:    Tick Bite        4/16/2024    12:17 PM   Additional Questions   Roomed by Erica MILTON   Accompanied by self         4/16/2024    12:17 PM   Patient Reported Additional Medications   Patient reports taking the following new medications none     Video Start Time:     History of Present Illness       Reason for visit:  Deer tick bite with rash requesting prophylactic dose of doxycycline  Symptom onset:  Today  Symptoms include:  Rash  Symptom intensity:  Moderate  Symptom progression:  Staying the same  Had these symptoms before:  No    She eats 2-3 servings of fruits and vegetables daily.She consumes 0 sweetened beverage(s) daily.She exercises with enough effort to increase her heart rate 30 to 60 minutes per day.  She exercises with enough effort to increase her heart rate 5 days per week.   She is taking medications regularly.       Concern - Tick bite   Onset: Last night   Description: Found deer tick on abdomen- not sure how long it's been there   Intensity: mild  Progression of Symptoms:  same- little bit better today  Accompanying Signs & Symptoms: big red " blotch and yellow rash around the bite   Previous history of similar problem: None- but had found a tick on her dog a few days ago and her [place] her [place]   Precipitating factors:        Worsened by: Tender to the touch   Alleviating factors:        Improved by: none  Therapies tried and outcome: None          Review of Systems  Constitutional, HEENT, cardiovascular, pulmonary, gi and gu systems are negative, except as otherwise noted.      Objective           Vitals:  No vitals were obtained today due to virtual visit.    Physical Exam   GENERAL: alert and no distress  EYES: Eyes grossly normal to inspection.  No discharge or erythema, or obvious scleral/conjunctival abnormalities.  RESP: No audible wheeze, cough, or visible cyanosis.    SKIN: Abrasion on lower abdomen  NEURO: Cranial nerves grossly intact.  Mentation and speech appropriate for age.  PSYCH: Appropriate affect, tone, and pace of words          Video-Visit Details    Type of service:  Video Visit   Video End Time:  Originating Location (pt. Location): Home    Distant Location (provider location):  Off-site  Platform used for Video Visit: Giuliana  Signed Electronically by: Aysha Sung DO

## 2024-04-16 NOTE — TELEPHONE ENCOUNTER
Dr. Billy,       Pharmacist called, stated patient's insurance will not cover the extended release doxycycline. Can they have a new script for regular doxycycline 100 mg to take 2 tabs as insurance will cover this.     Thanks,  BETTE Gunter  [facility]

## 2024-04-17 ENCOUNTER — TELEPHONE (OUTPATIENT)
Dept: FAMILY MEDICINE | Facility: CLINIC | Age: 49
End: 2024-04-17
Payer: COMMERCIAL

## 2024-04-17 RX ORDER — DOXYCYCLINE HYCLATE 100 MG
200 TABLET ORAL ONCE
Qty: 2 TABLET | Refills: 0 | Status: SHIPPED | OUTPATIENT
Start: 2024-04-17 | End: 2024-04-17

## 2024-04-17 NOTE — TELEPHONE ENCOUNTER
Patient calling, was seen yesterday 4/16/24 virtually with Dr. Sung for tick bite. Was prescribed below medication:    Tick bite of abdominal wall, initial encounter  Meets criteria for prophylactic doxycycline   - Doxycycline Hyclate 200 MG TBEC; Take 200 mg by mouth once for 1 dose    Patient states that her insurance does not cover the Doxycycline Hyclate or extended release form and pharmacy does not have in stock. Patient asking for just Doxycycline to be sent as this is covered and in stock.    Pharmacy: [id number] - [place] [phone]     Thanks,  Selena Oropeza, KENAN RN  [facility]

## 2024-04-18 ENCOUNTER — VIRTUAL VISIT (OUTPATIENT)
Dept: PSYCHOLOGY | Facility: CLINIC | Age: 49
End: 2024-04-18
Payer: COMMERCIAL

## 2024-04-18 DIAGNOSIS — F41.1 GAD (GENERALIZED ANXIETY DISORDER): ICD-10-CM

## 2024-04-18 DIAGNOSIS — F90.0 ADHD (ATTENTION DEFICIT HYPERACTIVITY DISORDER), INATTENTIVE TYPE: Primary | ICD-10-CM

## 2024-04-18 PROCEDURE — 90837 PSYTX W PT 60 MINUTES: CPT | Mod: 95

## 2024-04-18 NOTE — PROGRESS NOTES
[facility]                                     Progress Note    Patient Name: Tressa Jeong  Date: 4/18/2024      Service Type: Individual      Session Start Time: 1:30 PM  Session End Time: 2:25 PM     Session Length: 53+ min    Session #: 18    Attendees: Client attended alone    Service Modality:  Video Visit:      Provider verified identity through the following two step process.  Patient provided:  Patient is known previously to provider    Telemedicine Visit: The patient's condition can be safely assessed and treated via synchronous audio and visual telemedicine encounter.      Reason for Telemedicine Visit: Patient has requested telehealth visit    Originating Site (Patient Location): Patient's home    Distant Site (Provider Location): Provider Remote Setting- Home Office    Consent:  The patient/guardian has verbally consented to: the potential risks and benefits of telemedicine (video visit) versus in person care; bill my insurance or make self-payment for services provided; and responsibility for payment of non-covered services.     Patient would like the video invitation sent by:  My Chart    Mode of Communication:  Video Conference via Amwell    Distant Location (Provider):  Off-site    As the provider I attest to compliance with applicable laws and regulations related to telemedicine.    DATA  Extended Session (53+ minutes): PROLONGED SERVICE IN THE OUTPATIENT SETTING REQUIRING DIRECT (FACE-TO-FACE) PATIENT CONTACT BEYOND THE USUAL SERVICE:    - Patient's presenting concerns require more intensive intervention than could be completed within the usual service  Interactive Complexity: No  Crisis: No        Progress Since Last Session (Related to Symptoms / Goals / Homework):   Symptoms: Worsening symptoms of depression and no change in symptoms of anxiety based PHQ-9 and LYUBOV-7 scores and patient self-report.    Homework: Achieved / completed to satisfaction      Episode of Care  Goals: Satisfactory progress - ACTION (Actively working towards change); Intervened by reinforcing change plan / affirming steps taken     Current / Ongoing Stressors and Concerns:   Patient reports she is concerned about ADHD symptoms. Patient reports she is concerned about motivation. Patient reports she is concerned about working. Patient reports she is concerned about task completion.  Patient reports she is concerned about prioritizing tasks. Patient reports her dad is home for the winter. Patient reports concerns about re-entering the workforce. Patient reports she is concerned about her sleep habits. Patient reports she is concerned about productivity.      Treatment Objective(s) Addressed in This Session:   Patient will learn about the diagnosis and symptoms of ADHD.   Patient will learn about how ADHD can impact social interactions and interpersonal relationships.  Client will identify triggers of anxiety and process them in session.    Client will identify initial signs or symptoms of anxiety.      Intervention:   Motivational Interviewing: supported patient in processing and exploring concerns about organization.   Validated patient's emotions. Supported patient in exploring how it is easier to stay organized when her dad is around. Discussed judgments from others being motivating. Supported patient in exploring how organization improves symptos of ADHD. Supported patient in exploring what she has tried in the past to keep up with organization levels. Discussed continuing to explore strategies that might help up keep her current level of organization.      Assessments completed prior to visit:  The following assessments were completed by patient for this visit:  PHQ9:       9/13/2022     8:51 AM 11/15/2022    11:10 AM 7/11/2023     3:49 PM 10/27/2023    11:00 AM 12/8/2023    10:45 AM 1/19/2024    11:00 AM 4/4/2024    12:00 PM   PHQ-9 SCORE   [facility] (Moderately severe depression)  4  (Minimal depression)  10 (Moderate depression)     PHQ-9 Total Score 17 15 4 15 10 9 11     GAD7:       9/29/2023    11:00 AM 10/27/2023    11:00 AM 11/20/2023     2:00 PM 1/5/2024    11:00 AM 1/19/2024    11:00 AM 3/1/2024    11:00 AM 4/4/2024    12:00 PM   LYUBOV-7 SCORE   Total Score 7 9 12 9 11 7 7     PROMIS 10-Global Health (only subscores and total score):       7/11/2023     4:12 PM 10/13/2023    12:54 AM 10/26/2023    10:36 PM 11/20/2023     2:17 PM 1/19/2024    11:39 AM 2/29/2024    12:43 PM 4/4/2024    12:44 PM   PROMIS-10 Scores Only   Global Mental Health Score 11 10 9 9 10 11 11   Global Physical Health Score 16 16 15 16 17 14 15   PROMIS TOTAL - SUBSCORES 27 26 24 25 27 25 26         ASSESSMENT: Current Emotional / Mental Status (status of significant symptoms):   Risk status (Self / Other harm or suicidal ideation)   Patient denies current fears or concerns for personal safety.   Patient denies current or recent suicidal ideation or behaviors.   Patient denies current or recent homicidal ideation or behaviors.   Patient denies current or recent self injurious behavior or ideation.   Patient denies other safety concerns.   Patient reports there has been no change in risk factors since their last session.     Patient reports there has been no change in protective factors since their last session.     Recommended that patient call 911 or go to the local ED should there be a change in any of these risk factors.     Appearance:   Appropriate    Eye Contact:   Good    Psychomotor Behavior: Normal    Attitude:   Cooperative    Orientation:   All   Speech    Rate / Production: Hyperverbal     Volume:  Normal    Mood:    Anxious    Affect:    Worrisome    Thought Content:  Rumination    Thought Form:  Logical    Insight:    Good      Medication Review:   No changes to current psychiatric medication(s)     Medication Compliance:   Yes     Changes in Health Issues:   None reported     Chemical Use  Review:   Substance Use: Chemical use reviewed, no active concerns identified      Tobacco Use: No current tobacco use.      Diagnosis:  1. ADHD (attention deficit hyperactivity disorder), inattentive type    2. LYUBOV (generalized anxiety disorder)        Collateral Reports Completed:   Not Applicable    PLAN: (Patient Tasks / Therapist Tasks / Other)  Patient will journal about how she can upkeep her current level of organization.  We will discuss next session.         Aline Jimenez, [facility] 4/18/2024    ___________________________________________________________                                              Individual Treatment Plan    Patient's Name: Tressa Jeong  YOB: 1975    Date of Creation: 7/28/2023  Date Treatment Plan Last Reviewed/Revised: 4/4/2024  DSM5 Diagnoses: Attention-Deficit/Hyperactivity Disorder  314.00 (F90.0) Predominantly inattentive presentation or 300.02 (F41.1) Generalized Anxiety Disorder  Psychosocial / Contextual Factors: Patient is currently unemployed. Patient is living with her dad part time. Patient is single.   PROMIS (reviewed every 90 days):The following assessments were completed by patient for this visit:  PROMIS 10-Global Health (only subscores and total score):       7/11/2023     4:12 PM 10/13/2023    12:54 AM 10/26/2023    10:36 PM 11/20/2023     2:17 PM 1/19/2024    11:39 AM 2/29/2024    12:43 PM 4/4/2024    12:44 PM   PROMIS-10 Scores Only   Global Mental Health Score 11 10 9 9 10 11 11   Global Physical Health Score 16 16 15 16 17 14 15   PROMIS TOTAL - SUBSCORES 27 26 24 25 27 25 26        Referral / Collaboration:  Referral to another professional/service is not indicated at this time.    Anticipated number of session for this episode of care:  50  Anticipation frequency of session: Every other week  Anticipated Duration of each session: 53 or more minutes  Treatment plan will be reviewed in 90 days or when goals have been changed. There has been demonstrated  improvement in functioning while patient has been engaged in psychotherapy/psychological service- if withdrawn the patient would deteriorate and/or relapse.     MeasurableTreatment Goal(s) related to diagnosis / functional impairment(s)  Goal 1: Patient will work on stabilizing symptoms of ADHD.      Objective #A  Patient will learn about the diagnosis and symptoms of ADHD.   Status: Continued - Date(s): 4/4/2024    Intervention(s)  Therapist will teach about the diagnosis of ADHD.     Objective #B  Patient will practice setting goals and completing them.                         Status: Continued - Date(s): 4/4/2024     Intervention(s)  Therapist will teach SMART goals and how to break tasks down into smaller tasks.     Objective #C  Patient will learn 3 skills to help increase motivation and organization.   Status: Continued - Date(s): 4/4/2024    Intervention(s)  Therapist will assign homework of using coping skills to increase motivation and organizational skills.    Objective #D  Patient will learn about how ADHD can impact social interactions and interpersonal relationships.  Status:Continued - Date(s): 4/4/2024    Intervention(s)  Therapist will teach about how ADHD can impact social interactions and interpersonal relationships.     Objective #D  Patient will create and adhere to a routine.  Status:New - Date: 4/18/2024    Intervention(s)  Therapist will assign homework to create a routine and teach about how to maintain routine.         Goal 2: Client will work on stabilizing anxiety symptoms as evidenced by LYUBOV-7 scores (current is 7) and Self report.  Goal is to reduce by five points.      I will know I've met my goal when I can better manage my anxiety .      Objective #A Client will identify triggers of anxiety and process them in session.    Status: Continued - Date(s): 4/4/2024    Intervention(s)  Therapist will teach emotional recognition/identification by assigning homework to journal with written  exercises.    Objective #B  Client will identify initial signs or symptoms of anxiety.    Status: Continued - Date(s): 4/4/2024    Intervention(s)  Therapist will teach emotional regulation skills, such as deep breathing and mindfulness skills.    Objective #C  Client will learn about co-morbidities between ADHD and LYUBOV.   Status: Continued - Date(s): 4/4/2024    Intervention(s)  Therapist will provide educational materials on symptoms of ADHD and LYUBOV.          Patient has reviewed and agreed to the above plan.      TYE Marie  July 28, 2023  Reviewed 10/27/2023  Re-reviewed 1/19/2024 Re-reviewed 4/4/2024

## 2024-04-21 ASSESSMENT — ENCOUNTER SYMPTOMS
EYE PAIN: 0
SORE THROAT: 0
SHORTNESS OF BREATH: 0
FEVER: 0
CHILLS: 0
HEMATURIA: 0
DECREASED CONCENTRATION: 1
COUGH: 0
DYSURIA: 0
COLOR CHANGE: 0
ABDOMINAL PAIN: 0
BACK PAIN: 0
PALPITATIONS: 0
SEIZURES: 0
VOMITING: 0
ARTHRALGIAS: 0
DYSPHORIC MOOD: 1

## 2024-04-24 ENCOUNTER — TELEPHONE (OUTPATIENT)
Dept: PSYCHOLOGY | Facility: CLINIC | Age: 49
End: 2024-04-24
Payer: COMMERCIAL

## 2024-04-24 NOTE — TELEPHONE ENCOUNTER
Provider called patient to reschedule her appointment on 6/7 due to provider's schedule changing. Patient has been rescheduled.

## 2024-05-02 ENCOUNTER — VIRTUAL VISIT (OUTPATIENT)
Dept: PSYCHOLOGY | Facility: CLINIC | Age: 49
End: 2024-05-02
Payer: COMMERCIAL

## 2024-05-02 DIAGNOSIS — F41.1 GAD (GENERALIZED ANXIETY DISORDER): ICD-10-CM

## 2024-05-02 DIAGNOSIS — F90.0 ADHD (ATTENTION DEFICIT HYPERACTIVITY DISORDER), INATTENTIVE TYPE: Primary | ICD-10-CM

## 2024-05-02 PROCEDURE — 90837 PSYTX W PT 60 MINUTES: CPT | Mod: 95

## 2024-05-02 ASSESSMENT — ANXIETY QUESTIONNAIRES
4. TROUBLE RELAXING: NEARLY EVERY DAY
1. FEELING NERVOUS, ANXIOUS, OR ON EDGE: SEVERAL DAYS
GAD7 TOTAL SCORE: 7
5. BEING SO RESTLESS THAT IT IS HARD TO SIT STILL: SEVERAL DAYS
GAD7 TOTAL SCORE: 7
6. BECOMING EASILY ANNOYED OR IRRITABLE: MORE THAN HALF THE DAYS
3. WORRYING TOO MUCH ABOUT DIFFERENT THINGS: NOT AT ALL
7. FEELING AFRAID AS IF SOMETHING AWFUL MIGHT HAPPEN: NOT AT ALL
IF YOU CHECKED OFF ANY PROBLEMS ON THIS QUESTIONNAIRE, HOW DIFFICULT HAVE THESE PROBLEMS MADE IT FOR YOU TO DO YOUR WORK, TAKE CARE OF THINGS AT HOME, OR GET ALONG WITH OTHER PEOPLE: SOMEWHAT DIFFICULT
2. NOT BEING ABLE TO STOP OR CONTROL WORRYING: NOT AT ALL

## 2024-05-02 NOTE — PROGRESS NOTES
[facility]                                     Progress Note    Patient Name: Tressa Jeong  Date: 5/2/2024      Service Type: Individual      Session Start Time: 1:30 PM  Session End Time: 2:25 PM     Session Length: 53+ min    Session #: 19    Attendees: Client attended alone    Service Modality:  Video Visit:      Provider verified identity through the following two step process.  Patient provided:  Patient is known previously to provider    Telemedicine Visit: The patient's condition can be safely assessed and treated via synchronous audio and visual telemedicine encounter.      Reason for Telemedicine Visit: Patient has requested telehealth visit    Originating Site (Patient Location): Patient's home    Distant Site (Provider Location): Provider Remote Setting- Home Office    Consent:  The patient/guardian has verbally consented to: the potential risks and benefits of telemedicine (video visit) versus in person care; bill my insurance or make self-payment for services provided; and responsibility for payment of non-covered services.     Patient would like the video invitation sent by:  My Chart    Mode of Communication:  Video Conference via Amwell    Distant Location (Provider):  Off-site    As the provider I attest to compliance with applicable laws and regulations related to telemedicine.    DATA  Extended Session (53+ minutes): PROLONGED SERVICE IN THE OUTPATIENT SETTING REQUIRING DIRECT (FACE-TO-FACE) PATIENT CONTACT BEYOND THE USUAL SERVICE:    - Patient's presenting concerns require more intensive intervention than could be completed within the usual service  Interactive Complexity: No  Crisis: No        Progress Since Last Session (Related to Symptoms / Goals / Homework):   Symptoms: No change based on LYUBOV-7 scores and patient self-report.    Homework: Partially completed      Episode of Care Goals: Satisfactory progress - ACTION (Actively working towards change); Intervened by  reinforcing change plan / affirming steps taken     Current / Ongoing Stressors and Concerns:   Patient reports she is concerned about ADHD symptoms. Patient reports she is concerned about motivation. Patient reports she is concerned about working. Patient reports she is concerned about task completion.  Patient reports she is concerned about prioritizing tasks. Patient reports her dad is home for the winter. Patient reports concerns about re-entering the workforce. Patient reports she is concerned about her sleep habits. Patient reports she is concerned about productivity. Patient reports concerns about organization.      Treatment Objective(s) Addressed in This Session:   Patient will learn about the diagnosis and symptoms of ADHD.   Patient will learn about how ADHD can impact social interactions and interpersonal relationships.  Client will identify triggers of anxiety and process them in session.    Client will identify initial signs or symptoms of anxiety.      Intervention:   Motivational Interviewing: supported patient in processing and exploring organization.   Validated patient's emotions. Supported patient in exploring what has worked in the past to keep up organization. Reviewed breaking down large tasks into smaller tasks. Discussed trying to address one space at a time. Discussed prioritizing spaces instead of tasks.     Assessments completed prior to visit:  The following assessments were completed by patient for this visit:  PHQ9:       9/13/2022     8:51 AM 11/15/2022    11:10 AM 7/11/2023     3:49 PM 10/27/2023    11:00 AM 12/8/2023    10:45 AM 1/19/2024    11:00 AM 4/4/2024    12:00 PM   PHQ-9 SCORE   PHQ-9 Total Score MyChart 17 (Moderately severe depression)  4 (Minimal depression)  10 (Moderate depression)     PHQ-9 Total Score 17 15 4 15 10 9 11     GAD7:       10/27/2023    11:00 AM 11/20/2023     2:00 PM 1/5/2024    11:00 AM 1/19/2024    11:00 AM 3/1/2024    11:00 AM 4/4/2024    12:00 PM  5/2/2024     1:00 PM   LYUBOV-7 SCORE   Total Score 9 12 9 11 7 7 7     PROMIS 10-Global Health (only subscores and total score):       7/11/2023     4:12 PM 10/13/2023    12:54 AM 10/26/2023    10:36 PM 11/20/2023     2:17 PM 1/19/2024    11:39 AM 2/29/2024    12:43 PM 4/4/2024    12:44 PM   PROMIS-10 Scores Only   Global Mental Health Score 11 10 9 9 10 11 11   Global Physical Health Score 16 16 15 16 17 14 15   PROMIS TOTAL - SUBSCORES 27 26 24 25 27 25 26         ASSESSMENT: Current Emotional / Mental Status (status of significant symptoms):   Risk status (Self / Other harm or suicidal ideation)   Patient denies current fears or concerns for personal safety.   Patient denies current or recent suicidal ideation or behaviors.   Patient denies current or recent homicidal ideation or behaviors.   Patient denies current or recent self injurious behavior or ideation.   Patient denies other safety concerns.   Patient reports there has been no change in risk factors since their last session.     Patient reports there has been no change in protective factors since their last session.     Recommended that patient call 911 or go to the local ED should there be a change in any of these risk factors.     Appearance:   Appropriate    Eye Contact:   Good    Psychomotor Behavior: Normal    Attitude:   Cooperative    Orientation:   All   Speech    Rate / Production: Hyperverbal     Volume:  Normal    Mood:    Anxious    Affect:    Worrisome    Thought Content:  Rumination    Thought Form:  Logical    Insight:    Good      Medication Review:   No changes to current psychiatric medication(s)     Medication Compliance:   Yes     Changes in Health Issues:   None reported     Chemical Use Review:   Substance Use: Chemical use reviewed, no active concerns identified      Tobacco Use: No current tobacco use.      Diagnosis:  1. ADHD (attention deficit hyperactivity disorder), inattentive type    2. LYUBOV (generalized anxiety disorder)         Collateral Reports Completed:   Not Applicable    PLAN: (Patient Tasks / Therapist Tasks / Other)  Patient will practice breaking down large tasks into smaller tasks. Patient will focus on one space at a time.  We will discuss next session.         Aline Jimenez, [facility] 5/2/2024    ___________________________________________________________                                              Individual Treatment Plan    Patient's Name: Tressa Jeong  YOB: 1975    Date of Creation: 7/28/2023  Date Treatment Plan Last Reviewed/Revised: 4/4/2024  DSM5 Diagnoses: Attention-Deficit/Hyperactivity Disorder  314.00 (F90.0) Predominantly inattentive presentation or 300.02 (F41.1) Generalized Anxiety Disorder  Psychosocial / Contextual Factors: Patient is currently unemployed. Patient is living with her dad part time. Patient is single.   PROMIS (reviewed every 90 days):The following assessments were completed by patient for this visit:  PROMIS 10-Global Health (only subscores and total score):       7/11/2023     4:12 PM 10/13/2023    12:54 AM 10/26/2023    10:36 PM 11/20/2023     2:17 PM 1/19/2024    11:39 AM 2/29/2024    12:43 PM 4/4/2024    12:44 PM   PROMIS-10 Scores Only   Global Mental Health Score 11 10 9 9 10 11 11   Global Physical Health Score 16 16 15 16 17 14 15   PROMIS TOTAL - SUBSCORES 27 26 24 25 27 25 26        Referral / Collaboration:  Referral to another professional/service is not indicated at this time.    Anticipated number of session for this episode of care:  50  Anticipation frequency of session: Every other week  Anticipated Duration of each session: 53 or more minutes  Treatment plan will be reviewed in 90 days or when goals have been changed. There has been demonstrated improvement in functioning while patient has been engaged in psychotherapy/psychological service- if withdrawn the patient would deteriorate and/or relapse.     MeasurableTreatment Goal(s) related to diagnosis /  functional impairment(s)  Goal 1: Patient will work on stabilizing symptoms of ADHD.      Objective #A  Patient will learn about the diagnosis and symptoms of ADHD.   Status: Continued - Date(s): 4/4/2024    Intervention(s)  Therapist will teach about the diagnosis of ADHD.     Objective #B  Patient will practice setting goals and completing them.                         Status: Continued - Date(s): 4/4/2024     Intervention(s)  Therapist will teach SMART goals and how to break tasks down into smaller tasks.     Objective #C  Patient will learn 3 skills to help increase motivation and organization.   Status: Continued - Date(s): 4/4/2024    Intervention(s)  Therapist will assign homework of using coping skills to increase motivation and organizational skills.    Objective #D  Patient will learn about how ADHD can impact social interactions and interpersonal relationships.  Status:Continued - Date(s): 4/4/2024    Intervention(s)  Therapist will teach about how ADHD can impact social interactions and interpersonal relationships.     Objective #D  Patient will create and adhere to a routine.  Status:New - Date: 4/18/2024    Intervention(s)  Therapist will assign homework to create a routine and teach about how to maintain routine.         Goal 2: Client will work on stabilizing anxiety symptoms as evidenced by LYUBOV-7 scores (current is 7) and Self report.  Goal is to reduce by five points.      I will know I've met my goal when I can better manage my anxiety .      Objective #A Client will identify triggers of anxiety and process them in session.    Status: Continued - Date(s): 4/4/2024    Intervention(s)  Therapist will teach emotional recognition/identification by assigning homework to journal with written exercises.    Objective #B  Client will identify initial signs or symptoms of anxiety.    Status: Continued - Date(s): 4/4/2024    Intervention(s)  Therapist will teach emotional regulation skills, such as deep  breathing and mindfulness skills.    Objective #C  Client will learn about co-morbidities between ADHD and LYUBOV.   Status: Continued - Date(s): 4/4/2024    Intervention(s)  Therapist will provide educational materials on symptoms of ADHD and LYUBOV.          Patient has reviewed and agreed to the above plan.      TYE Marie  July 28, 2023  Reviewed 10/27/2023  Re-reviewed 1/19/2024 Re-reviewed 4/4/2024

## 2024-05-13 ENCOUNTER — OFFICE VISIT (OUTPATIENT)
Dept: SLEEP MEDICINE | Facility: CLINIC | Age: 49
End: 2024-05-13
Payer: COMMERCIAL

## 2024-05-13 VITALS
HEIGHT: 65 IN | BODY MASS INDEX: 32.71 KG/M2 | DIASTOLIC BLOOD PRESSURE: 86 MMHG | WEIGHT: 196.3 LBS | HEART RATE: 71 BPM | SYSTOLIC BLOOD PRESSURE: 126 MMHG | OXYGEN SATURATION: 99 %

## 2024-05-13 DIAGNOSIS — G47.33 OSA (OBSTRUCTIVE SLEEP APNEA): Primary | ICD-10-CM

## 2024-05-13 DIAGNOSIS — R06.83 SNORING: ICD-10-CM

## 2024-05-13 PROCEDURE — 99215 OFFICE O/P EST HI 40 MIN: CPT | Performed by: INTERNAL MEDICINE

## 2024-05-13 ASSESSMENT — SLEEP AND FATIGUE QUESTIONNAIRES
HOW LIKELY ARE YOU TO NOD OFF OR FALL ASLEEP WHILE WATCHING TV: MODERATE CHANCE OF DOZING
HOW LIKELY ARE YOU TO NOD OFF OR FALL ASLEEP WHILE LYING DOWN TO REST IN THE AFTERNOON WHEN CIRCUMSTANCES PERMIT: MODERATE CHANCE OF DOZING
HOW LIKELY ARE YOU TO NOD OFF OR FALL ASLEEP WHILE SITTING AND READING: SLIGHT CHANCE OF DOZING
HOW LIKELY ARE YOU TO NOD OFF OR FALL ASLEEP WHILE SITTING QUIETLY AFTER LUNCH WITHOUT ALCOHOL: WOULD NEVER DOZE
HOW LIKELY ARE YOU TO NOD OFF OR FALL ASLEEP WHILE SITTING AND TALKING TO SOMEONE: WOULD NEVER DOZE
HOW LIKELY ARE YOU TO NOD OFF OR FALL ASLEEP WHEN YOU ARE A PASSENGER IN A CAR FOR AN HOUR WITHOUT A BREAK: SLIGHT CHANCE OF DOZING
HOW LIKELY ARE YOU TO NOD OFF OR FALL ASLEEP IN A CAR, WHILE STOPPED FOR A FEW MINUTES IN TRAFFIC: WOULD NEVER DOZE
HOW LIKELY ARE YOU TO NOD OFF OR FALL ASLEEP WHILE SITTING INACTIVE IN A PUBLIC PLACE: WOULD NEVER DOZE

## 2024-05-13 NOTE — PROGRESS NOTES
[facility]   [facility]  Outpatient Sleep Medicine Consultation  May 13, 2024    Name: Tressa Jeong MRN# [id number]   Age: 48 year old YOB: 1975     Date of Consultation: May 13, 2024  Consultation is requested by: No referring provider defined for this encounter.  Primary care provider: Yi Vegas             Assessment and Plan:   Tressa Jeong is a 48 year old female with recent diagnosis of attention deficit hyperactivity disorder on amphetamine-dextroamphetamine, mild predominantly positional obstructive sleep apnea using positional therapy and circadian rhythm-delayed sleep phase disorder is here for a clinical follow-up.  Sleep disordered breathing.  Mild, positional obstructive sleep apnea.  She is sleeping much better with positional therapy.  She would like to be evaluated for a mandibular advancement device as she would like to sleep with less restrictions or body position.    Circadian rhythm-delayed sleep phase disorder.  She has been more consistent with her bedtime with use of 300 mcg of melatonin which she is currently taking at 10 PM.  Her current bedtime is at 1 AM and she is usually waking up at 11 AM.  Once or twice during the week she will have early awakening either spontaneously or due to her social commitments.  She is also being less consistent with her bedtime.  Inadequate sleep hygiene.  She has been following recommendation regarding going to bed no earlier than 1 AM but on occasion has been sleeping later into the night.  She also has not been very careful regarding light exposure prior to 9 AM.      Comorbid Diagnoses:    Attention deficit hyperactivity disorder, currently taking 15 mg of amphetamine-dextroamphetamine daily.    Summary Recommendations:    Request for dental sleep consult to evaluate for mandibular advancement device was made today to treat her mild positional obstructive sleep apnea.  Sleep  hygiene was discussed and recommended again.  She should maintain consistent bed and wake time.  Continue to take 300 mcg of melatonin at 10 PM with intention to sleep at 1 AM.  She should wake up no later than 11 AM.  On occasions when she was wakes up before 9 AM she should avoid ambient light exposure by using dark glasses or sleeping without covers.    Summary Counseling:  New sleep schedule recommendation: Bedtime 1 AM, wake time 11 AM.    Check out [email or web address].aasmnet.org/           History of Present Illness:     Tressa Jeong is a 48 year old female who was seen in my clinic in February 2024.  She has been taking 300 mcg of melatonin around 10 PM and is finding it easy to sleep around 1 AM.  On occasions eventually she is sleeping later into the night (usually once a week).  On most days she will wake up at 11 AM but occasionally either due to an appointment or social commitment or some times spontaneously will wake up between 7 AM to 9 AM.  Her bedroom is pretty well lit and the excess of ambient light occasionally has caused difficulty in falling back to sleep.    PREVIOUS SLEEP STUDIES:  Date: 01/22/2024  AHI: 5.5/hour, supine AHI 12.4/hour.  Sleep Architecture: Poor sleep efficiency - 66.6%, Sleep Latency 130.5 minutes, WASO 49 minutes.    CURRENT THERAPY-Subjective:  Sleep wake schedule:   Bedtime 1 AM  Awakening 11 AM  not using alarm   Nonworkday bedtime 1-2 AM Awakening 11 AM - 12 PM   1-2 Awakenings for 2-5 minutes per night/week  Naps: None    [place] Sleepiness Scale: 6/24  SENDY Total Score: 15           Medications:     Current Outpatient Medications   Medication Sig Dispense Refill    amphetamine-dextroamphetamine (ADDERALL) 15 MG tablet Take 20 mg by mouth daily      betamethasone valerate (VALISONE) 0.1 % external ointment Apply topically 2 times daily Apply twice daily as needed for bug bites. 45 g 2    buPROPion (WELLBUTRIN XL) 150 MG 24 hr tablet Take 1 tablet every day by oral route.*   "    clonazePAM (KLONOPIN) 0.5 MG tablet Take 0.5 mg by mouth daily as needed      fluocinonide (LIDEX) 0.05 % external ointment Apply twice daily as needed for bug bites on trunk or extremities 60 g 2    sertraline (ZOLOFT) 50 MG tablet Take 50 mg by mouth daily      SF 5000 PLUS 1.1 % CREA BRUSH WITH A SMALL AMOUNT 2 TIMES PER DAY. DO NOT SWALLOW. DO NOT EAT, DRINK OR RINSE FOR 30 MINUTES AFTERWARD.*       No current facility-administered medications for this visit.        Allergies   Allergen Reactions    Latex     Morphine      Not life treated, burning and painful when administ            Past Medical History:     Does not need 02 supplement at night   Past Medical History:   Diagnosis Date    Depressive disorder              Past Surgical History:      Past Surgical History:   Procedure Laterality Date    COLONOSCOPY N/A 9/8/2023    Procedure: COLONOSCOPY, WITH POLYPECTOMY;  Surgeon: Mk Garay MD;  [place] [facility] [place]THOPEDIC SURGERY              Physical Examination:     Objective   Vitals:  /86   Pulse 71   Ht 1.651 m (5' 5\")   Wt 89 kg (196 lb 4.8 oz)   LMP 03/27/2024 (Exact Date)   SpO2 99%   BMI 32.67 kg/m     healthy, alert, and no distress  Psych: Alert and oriented times 3; coherent speech, normal   rate and volume, able to articulate logical thoughts, able   to abstract reason, no tangential thoughts, no hallucinations   or delusions  His affect is normal.    Copy to: Yi Vegas    Total of 43 minutes of time was spent with patient, this included the interview and exam, and review of the chart/labs/imaging/sleep study/PAP therapy data on 05/13/2024. Greater than 50% of which was spent counseling and coordinating care.     Yonatan Alfaro MD 5/13/2024     [facility]   [address] 1, [address]   [address] [place], [place] [address] [address]   Appointments: [phone]  "

## 2024-05-13 NOTE — PATIENT INSTRUCTIONS
Your BMI is Body mass index is 32.67 kg/m .    What is BMI?  Body mass index (BMI) is one way to tell whether you are at a healthy weight, overweight, or obese. It measures your weight in relation to your height.  A BMI of 18.5 to 24.9 is in the healthy range. A person with a BMI of 25 to 29.9 is considered overweight, and someone with a BMI of 30 or greater is considered obese.  Another way to find out if you are at risk for health problems caused by overweight and obesity is to measure your waist. If you are a woman and your waist is more than 35 inches, or if you are a man and your waist is more than 40 inches, your risk of disease may be higher.  More than two-thirds of American adults are considered overweight or obese. Being overweight or obese increases the risk for further weight gain.  Excess weight may lead to heart disease and diabetes. Creating and following plans for healthy eating and physical activity may help you improve your health.    Methods for maintaining or losing weight.  Weight control is part of healthy lifestyle and includes exercise, emotional health, and healthy eating habits.  Careful eating habits lifelong is the mainstay of weight control.  Though there are significant health benefits from weight loss, long-term weight loss with diet alone may be very difficult to achieve- studies show long-term success with dietary management in less than 10% of people. Attaining a healthy weight may be especially difficult to achieve in those with severe obesity. In some cases, medications, devices and surgical management might be considered.    What can you do?  If you are overweight or obese and are interested in methods for weight loss, you should discuss this with your provider. In addition, we recommend that you review healthy life styles and methods for weight loss available through the National Institutes of Health patient information sites:    http://win.niddk.nih.gov/publications/index.htm    [facility]  Search engine: https://mms.aadsm.org/members/directory/search_bootstrap.php?org_id=ADSM&   Certified in Dental Sleep Medicine    Dariusz Samson  Degree: DDS  [address]  [address]  [place] [address]  Professional Phone: [phone]  Website: [email or web address]    Yoni Contreras  Degree: ALVARO  [facility]  [address]  [address]  [place] [address]  Professional Phone: [phone]: [phone]    [facility]  [address] [place] [address]  [address]  [place] [address]  Appointments: [phone]  Fax: [phone]    Jacklyn Pittman  [facility]  [address]  [place] [address]  Professional Phone: [phone]  Website: [email or web address]      Gt Sheth   [facility]   Degree: MD ALVARO   [address]  [address]  [place] [address]  Appointments: [phone]    Phill Martinez  Degree: DDS  [address]  [address]  [place] [address]  Professional Phone: [phone]  Fax: [phone]    Benito Ortiz  Degree: DDS  [facility]  [address]  [address]  [place] [address]  Professional Phone: [phone]  Website: [email or web address]      Delmi Joy  [facility]-you should verify insurance coverage  [address]  [address]  [place] [address]  Professional Phone: [phone]  Website: [email or web address]      Deepa Peters--DOES NOT ACCEPT INSURANCE  Degree: ALVARO--you should verify insurance coverage  [facility]  [address]  [address]  [place] [address]  Professional Phone: [phone]    Luna Brady  Degree: DDS, PhD  [facility]  [address] [place] [address]    Appointments: [phone]   Fax: [phone]     [facility]  Degree: OLGAS  [address] [place] [address]  Professional Phone: [phone]  Fax: [phone]  Website: [email or web address]      Elbert Lutz  Degree: DDS  [facility]  [address] [place] [address]    Mariona Mulet Pradera  Degree: MS ALVARO  [facility]  [address] [place] [address]  Professional Phone: [phone]      Eloina Snowden  Degree: MS ALVARO  [facility]  [address]  [address]  [place] [address]  Professional Phone: [phone]    Glenys Ch  Degree: ALVARO  [facility]  [address] [place] [address]  Appointments: [phone]    Dorian Hurd  Degree: ALVARO  [facility]  [address] [place] [address]  Professional Phone: [phone]  Website: [email or web address]      Jalen Alcaraz  Degree: OLGAS  [facility]  [address] [place] [address]  Professional Phone: [phone]  Fax: [phone]      Prasanna Schmidt  Degree: ALVARO  [facility]  [address]  [address]  [place] [address]    Javier Bronson   Degree: DDS   [facility]   [address]   [address]  [place] [address]  Phone [phone]  Website: [email or web address]    Dang Zamudio   Degree: ALVARO   [address]  [address]  [place] [address]  Appointments: [phone]    Katiuska Mann   Degree: ALVARO   [address] [place] [address]   Appointments: [phone]    Aravind Jackson   Degree: ALVARO   [address] [place] [address]   Appointments: [phone]    Dustin Wheatley   Degree: ALVARO   [address] [place] [address]   Appointments: [phone]    Arnold Nance   Degree: ALVARO [facility], [address] [address]   Appointments:  [phone]    Brandee Bar   Degree: DDS  [address] [place] [address]  Appointments: [phone]    Andrew Carrell   [address] [place] [address]  Appointments: [phone]    Sandhya Ibarra   Degree: DDS   [facility] [address] [address]   Appointments: [phone]    Derik Mooney   Degree: DDS   [address] [place] [address]   Appointments: [phone]    [facility]    Degree: ALVARO  [address]  [address]  [place] [address]  Appointments: [phone]    Adryan Poole   Degree: DDS   [facility]   [address]  [address]  [place] [address]  Appointment: [phone]    Luisa Girl   Degree: MS SAVAGE, FAAELOISA   [facility]  [address] [place] [address]  [address]  [place] [address]  Appointments: [phone]    Gt Miguel   Degree: DDS   [address]   [place] [address]   Appointments: [phone]   Fax: [phone]        ACCEPT MEDICARE  Rome Baez DDS  [address], [place] [address]  [phone]; [phone] (fax)  [place]    Hira Israel DDS, MS   [facility] [address]   [address]   [place] [address]   Appointments: [phone]   Fax: [phone]     Nelson Ryan   Degree: DMD, MSD   [facility]   [address]  [address]  [place] [address]  Appointments: [phone]  Fax: [phone]      ADDITIONAL PROVIDERS    Hector Salazar DDS    [facility]  [address] [place] [address]  Appointments: [phone]     [facility]  [address]  [address]  [place] [address]  Appointments: [phone]  Fax- [phone]    Fanta Hill   Degree: DDS   [facility]    [address] [address]  Appointments: [phone]

## 2024-05-24 ENCOUNTER — VIRTUAL VISIT (OUTPATIENT)
Dept: PSYCHOLOGY | Facility: CLINIC | Age: 49
End: 2024-05-24
Payer: COMMERCIAL

## 2024-05-24 DIAGNOSIS — F90.0 ADHD (ATTENTION DEFICIT HYPERACTIVITY DISORDER), INATTENTIVE TYPE: Primary | ICD-10-CM

## 2024-05-24 DIAGNOSIS — F41.1 GAD (GENERALIZED ANXIETY DISORDER): ICD-10-CM

## 2024-05-24 PROCEDURE — 90837 PSYTX W PT 60 MINUTES: CPT | Mod: 95

## 2024-05-24 NOTE — PROGRESS NOTES
[facility]                                     Progress Note    Patient Name: Tressa Jeong  Date: 5/24/2024      Service Type: Individual      Session Start Time: 11:30 AM  Session End Time: 12:25 PM     Session Length: 53+ min    Session #: 20    Attendees: Client attended alone    Service Modality:  Video Visit:      Provider verified identity through the following two step process.  Patient provided:  Patient is known previously to provider    Telemedicine Visit: The patient's condition can be safely assessed and treated via synchronous audio and visual telemedicine encounter.      Reason for Telemedicine Visit: Patient has requested telehealth visit    Originating Site (Patient Location): Patient's home    Distant Site (Provider Location): Provider Remote Setting- Home Office    Consent:  The patient/guardian has verbally consented to: the potential risks and benefits of telemedicine (video visit) versus in person care; bill my insurance or make self-payment for services provided; and responsibility for payment of non-covered services.     Patient would like the video invitation sent by:  My Chart    Mode of Communication:  Video Conference via [facility]    Distant Location (Provider):  Off-site    As the provider I attest to compliance with applicable laws and regulations related to telemedicine.    DATA  Extended Session (53+ minutes): PROLONGED SERVICE IN THE OUTPATIENT SETTING REQUIRING DIRECT (FACE-TO-FACE) PATIENT CONTACT BEYOND THE USUAL SERVICE:    - Patient's presenting concerns require more intensive intervention than could be completed within the usual service  Interactive Complexity: No  Crisis: No        Progress Since Last Session (Related to Symptoms / Goals / Homework):   Symptoms: No change based on LYUBOV-7 scores and patient self-report.    Homework: Partially completed      Episode of Care Goals: Satisfactory progress - ACTION (Actively working towards change); Intervened  by reinforcing change plan / affirming steps taken     Current / Ongoing Stressors and Concerns:   Patient reports she is concerned about ADHD symptoms. Patient reports she is concerned about motivation. Patient reports she is concerned about working. Patient reports she is concerned about task completion.  Patient reports she is concerned about prioritizing tasks. Patient reports her dad is home for the winter. Patient reports concerns about re-entering the workforce. Patient reports she is concerned about her sleep habits. Patient reports she is concerned about productivity. Patient reports concerns about organization.      Treatment Objective(s) Addressed in This Session:   Patient will learn about the diagnosis and symptoms of ADHD.   Patient will learn about how ADHD can impact social interactions and interpersonal relationships.  Client will identify triggers of anxiety and process them in session.    Client will identify initial signs or symptoms of anxiety.      Intervention:   Motivational Interviewing: supported patient in processing and exploring concerns about motivation.   Validated patient's emotions. Discussed how defiance can be related to ADHD. Supported patient in exploring what makes it hard to approach a task that she is told to do. Supported patient in exploring what motivates her to complete tasks in the garden. Supported patient in exploring what tasks she would like to work on completing. Discussed priorities. Discussed rescheduling of 6/21 appointment.      Assessments completed prior to visit:  The following assessments were completed by patient for this visit:  PHQ9:       9/13/2022     8:51 AM 11/15/2022    11:10 AM 7/11/2023     3:49 PM 10/27/2023    11:00 AM 12/8/2023    10:45 AM 1/19/2024    11:00 AM 4/4/2024    12:00 PM   PHQ-9 SCORE   PHQ-9 Total Score MyChart 17 (Moderately severe depression)  4 (Minimal depression)  10 (Moderate depression)     PHQ-9 Total Score 17 15 4 15 10 9 11      GAD7:       10/27/2023    11:00 AM 11/20/2023     2:00 PM 1/5/2024    11:00 AM 1/19/2024    11:00 AM 3/1/2024    11:00 AM 4/4/2024    12:00 PM 5/2/2024     1:00 PM   LYUBOV-7 SCORE   Total Score 9 12 9 11 7 7 7     PROMIS 10-Global Health (only subscores and total score):       7/11/2023     4:12 PM 10/13/2023    12:54 AM 10/26/2023    10:36 PM 11/20/2023     2:17 PM 1/19/2024    11:39 AM 2/29/2024    12:43 PM 4/4/2024    12:44 PM   PROMIS-10 Scores Only   Global Mental Health Score 11 10 9 9 10 11 11   Global Physical Health Score 16 16 15 16 17 14 15   PROMIS TOTAL - SUBSCORES 27 26 24 25 27 25 26         ASSESSMENT: Current Emotional / Mental Status (status of significant symptoms):   Risk status (Self / Other harm or suicidal ideation)   Patient denies current fears or concerns for personal safety.   Patient denies current or recent suicidal ideation or behaviors.   Patient denies current or recent homicidal ideation or behaviors.   Patient denies current or recent self injurious behavior or ideation.   Patient denies other safety concerns.   Patient reports there has been no change in risk factors since their last session.     Patient reports there has been no change in protective factors since their last session.     Recommended that patient call 911 or go to the local ED should there be a change in any of these risk factors.     Appearance:   Appropriate    Eye Contact:   Good    Psychomotor Behavior: Normal    Attitude:   Cooperative    Orientation:   All   Speech    Rate / Production: Hyperverbal     Volume:  Normal    Mood:    Anxious    Affect:    Worrisome    Thought Content:  Rumination    Thought Form:  Logical    Insight:    Good      Medication Review:   No changes to current psychiatric medication(s)     Medication Compliance:   Yes     Changes in Health Issues:   None reported     Chemical Use Review:   Substance Use: Chemical use reviewed, no active concerns identified      Tobacco Use: No  current tobacco use.      Diagnosis:  1. ADHD (attention deficit hyperactivity disorder), inattentive type    2. LYUBOV (generalized anxiety disorder)        Collateral Reports Completed:   Not Applicable    PLAN: (Patient Tasks / Therapist Tasks / Other)  Patient will identify what increases motivation for working in the garden. Patient will identifying tasks she would like to complete.   We will discuss next session.         Aline Jimenez, [facility] 5/24/2024    ___________________________________________________________                                              Individual Treatment Plan    Patient's Name: Tressa Jeong  YOB: 1975    Date of Creation: 7/28/2023  Date Treatment Plan Last Reviewed/Revised: 4/4/2024  DSM5 Diagnoses: Attention-Deficit/Hyperactivity Disorder  314.00 (F90.0) Predominantly inattentive presentation or 300.02 (F41.1) Generalized Anxiety Disorder  Psychosocial / Contextual Factors: Patient is currently unemployed. Patient is living with her dad part time. Patient is single.   PROMIS (reviewed every 90 days):The following assessments were completed by patient for this visit:  PROMIS 10-Global Health (only subscores and total score):       7/11/2023     4:12 PM 10/13/2023    12:54 AM 10/26/2023    10:36 PM 11/20/2023     2:17 PM 1/19/2024    11:39 AM 2/29/2024    12:43 PM 4/4/2024    12:44 PM   PROMIS-10 Scores Only   Global Mental Health Score 11 10 9 9 10 11 11   Global Physical Health Score 16 16 15 16 17 14 15   PROMIS TOTAL - SUBSCORES 27 26 24 25 27 25 26        Referral / Collaboration:  Referral to another professional/service is not indicated at this time.    Anticipated number of session for this episode of care:  50  Anticipation frequency of session: Every other week  Anticipated Duration of each session: 53 or more minutes  Treatment plan will be reviewed in 90 days or when goals have been changed. There has been demonstrated improvement in functioning while patient has  been engaged in psychotherapy/psychological service- if withdrawn the patient would deteriorate and/or relapse.     MeasurableTreatment Goal(s) related to diagnosis / functional impairment(s)  Goal 1: Patient will work on stabilizing symptoms of ADHD.      Objective #A  Patient will learn about the diagnosis and symptoms of ADHD.   Status: Continued - Date(s): 4/4/2024    Intervention(s)  Therapist will teach about the diagnosis of ADHD.     Objective #B  Patient will practice setting goals and completing them.                         Status: Continued - Date(s): 4/4/2024     Intervention(s)  Therapist will teach SMART goals and how to break tasks down into smaller tasks.     Objective #C  Patient will learn 3 skills to help increase motivation and organization.   Status: Continued - Date(s): 4/4/2024    Intervention(s)  Therapist will assign homework of using coping skills to increase motivation and organizational skills.    Objective #D  Patient will learn about how ADHD can impact social interactions and interpersonal relationships.  Status:Continued - Date(s): 4/4/2024    Intervention(s)  Therapist will teach about how ADHD can impact social interactions and interpersonal relationships.     Objective #D  Patient will create and adhere to a routine.  Status:New - Date: 4/18/2024    Intervention(s)  Therapist will assign homework to create a routine and teach about how to maintain routine.         Goal 2: Client will work on stabilizing anxiety symptoms as evidenced by LYUBOV-7 scores (current is 7) and Self report.  Goal is to reduce by five points.      I will know I've met my goal when I can better manage my anxiety .      Objective #A Client will identify triggers of anxiety and process them in session.    Status: Continued - Date(s): 4/4/2024    Intervention(s)  Therapist will teach emotional recognition/identification by assigning homework to journal with written exercises.    Objective #B  Client will identify  initial signs or symptoms of anxiety.    Status: Continued - Date(s): 4/4/2024    Intervention(s)  Therapist will teach emotional regulation skills, such as deep breathing and mindfulness skills.    Objective #C  Client will learn about co-morbidities between ADHD and LYUBOV.   Status: Continued - Date(s): 4/4/2024    Intervention(s)  Therapist will provide educational materials on symptoms of ADHD and LYUBOV.          Patient has reviewed and agreed to the above plan.      TYE Marie  July 28, 2023  Reviewed 10/27/2023  Re-reviewed 1/19/2024 Re-reviewed 4/4/2024

## 2024-06-07 ENCOUNTER — VIRTUAL VISIT (OUTPATIENT)
Dept: PSYCHOLOGY | Facility: CLINIC | Age: 49
End: 2024-06-07
Payer: COMMERCIAL

## 2024-06-07 DIAGNOSIS — F41.1 GAD (GENERALIZED ANXIETY DISORDER): ICD-10-CM

## 2024-06-07 DIAGNOSIS — F90.0 ADHD (ATTENTION DEFICIT HYPERACTIVITY DISORDER), INATTENTIVE TYPE: Primary | ICD-10-CM

## 2024-06-07 PROCEDURE — 90837 PSYTX W PT 60 MINUTES: CPT | Mod: 95

## 2024-06-07 NOTE — PROGRESS NOTES
[facility]                                     Progress Note    Patient Name: Tressa Jeong  Date: 6/7/2024      Service Type: Individual      Session Start Time: 10:01 AM  Session End Time: 10:56 AM     Session Length: 53+ min    Session #: 21    Attendees: Client attended alone    Service Modality:  Video Visit:      Provider verified identity through the following two step process.  Patient provided:  Patient is known previously to provider    Telemedicine Visit: The patient's condition can be safely assessed and treated via synchronous audio and visual telemedicine encounter.      Reason for Telemedicine Visit: Patient has requested telehealth visit    Originating Site (Patient Location): Patient's home    Distant Site (Provider Location): Provider Remote Setting- Home Office    Consent:  The patient/guardian has verbally consented to: the potential risks and benefits of telemedicine (video visit) versus in person care; bill my insurance or make self-payment for services provided; and responsibility for payment of non-covered services.     Patient would like the video invitation sent by:  My Chart    [facility]:  [facility]    Distant Location (Provider):  Off-site    As the provider I attest to compliance with applicable laws and regulations related to telemedicine.    DATA  Extended Session (53+ minutes): PROLONGED SERVICE IN THE OUTPATIENT SETTING REQUIRING DIRECT (FACE-TO-FACE) PATIENT CONTACT BEYOND THE USUAL SERVICE:    - Patient's presenting concerns require more intensive intervention than could be completed within the usual service  Interactive Complexity: No  Crisis: No        Progress Since Last Session (Related to Symptoms / Goals / Homework):   Symptoms: No change based on patient self-report.     Homework: Partially completed      Episode of Care Goals: Satisfactory progress - ACTION (Actively working towards change); Intervened by reinforcing  change plan / affirming steps taken     Current / Ongoing Stressors and Concerns:   Patient reports she is concerned about ADHD symptoms. Patient reports she is concerned about motivation. Patient reports she is concerned about working. Patient reports she is concerned about task completion.  Patient reports she is concerned about prioritizing tasks. Patient reports her dad is home for the winter. Patient reports concerns about re-entering the workforce. Patient reports she is concerned about her sleep habits. Patient reports she is concerned about productivity. Patient reports concerns about organization. Patient reports she is job searching.      Treatment Objective(s) Addressed in This Session:   Patient will learn about the diagnosis and symptoms of ADHD.   Patient will learn about how ADHD can impact social interactions and interpersonal relationships.  Client will identify triggers of anxiety and process them in session.    Client will identify initial signs or symptoms of anxiety.   Patient will learn 3 skills to help increase motivation and organization.     Intervention:   Motivational Interviewing: supported patient in processing and exploring job searching.   Validated patient's emotions. Supported patient in exploring concerns about how symptoms of sleep disorders and ADHD may impact jobs. Supported patient in exploring her needs at a job. Discussed writing out a list of her needs and her preferences at a job. Discussed exploring what compromises she would be willing to make on her preferences for a job. Reviewed how writing can help the brain process information and how ADHD impacts processing abilities.       Assessments completed prior to visit:  The following assessments were completed by patient for this visit:  PHQ9:       9/13/2022     8:51 AM 11/15/2022    11:10 AM 7/11/2023     3:49 PM 10/27/2023    11:00 AM 12/8/2023    10:45 AM 1/19/2024    11:00 AM 4/4/2024    12:00 PM   PHQ-9 SCORE   PHQ-9  Total Score MyChart 17 (Moderately severe depression)  4 (Minimal depression)  10 (Moderate depression)     PHQ-9 Total Score 17 15 4 15 10 9 11     GAD7:       10/27/2023    11:00 AM 11/20/2023     2:00 PM 1/5/2024    11:00 AM 1/19/2024    11:00 AM 3/1/2024    11:00 AM 4/4/2024    12:00 PM 5/2/2024     1:00 PM   LYUBOV-7 SCORE   Total Score 9 12 9 11 7 7 7     PROMIS 10-Global Health (only subscores and total score):       7/11/2023     4:12 PM 10/13/2023    12:54 AM 10/26/2023    10:36 PM 11/20/2023     2:17 PM 1/19/2024    11:39 AM 2/29/2024    12:43 PM 4/4/2024    12:44 PM   PROMIS-10 Scores Only   Global Mental Health Score 11 10 9 9 10 11 11   Global Physical Health Score 16 16 15 16 17 14 15   PROMIS TOTAL - SUBSCORES 27 26 24 25 27 25 26         ASSESSMENT: Current Emotional / Mental Status (status of significant symptoms):   Risk status (Self / Other harm or suicidal ideation)   Patient denies current fears or concerns for personal safety.   Patient denies current or recent suicidal ideation or behaviors.   Patient denies current or recent homicidal ideation or behaviors.   Patient denies current or recent self injurious behavior or ideation.   Patient denies other safety concerns.   Patient reports there has been no change in risk factors since their last session.     Patient reports there has been no change in protective factors since their last session.     Recommended that patient call 911 or go to the local ED should there be a change in any of these risk factors.     Appearance:   Appropriate    Eye Contact:   Good    Psychomotor Behavior: Normal    Attitude:   Cooperative    Orientation:   All   Speech    Rate / Production: Hyperverbal     Volume:  Normal    Mood:    Anxious    Affect:    Worrisome    Thought Content:  Rumination    Thought Form:  Logical    Insight:    Good      Medication Review:   No changes to current psychiatric medication(s)     Medication Compliance:   Yes     Changes in Health  Issues:   None reported     Chemical Use Review:   Substance Use: Chemical use reviewed, no active concerns identified      Tobacco Use: No current tobacco use.      Diagnosis:  1. ADHD (attention deficit hyperactivity disorder), inattentive type    2. LYUBOV (generalized anxiety disorder)        Collateral Reports Completed:   Not Applicable    PLAN: (Patient Tasks / Therapist Tasks / Other)  Patient will create a list of needs and preferences related to jobs.   We will discuss next session.         Aline Jimenez, [employer] 5/24/2024    ___________________________________________________________                                              Individual Treatment Plan    Patient's Name: Tressa Jeong  YOB: 1975    Date of Creation: 7/28/2023  Date Treatment Plan Last Reviewed/Revised: 4/4/2024  DSM5 Diagnoses: Attention-Deficit/Hyperactivity Disorder  314.00 (F90.0) Predominantly inattentive presentation or 300.02 (F41.1) Generalized Anxiety Disorder  Psychosocial / Contextual Factors: Patient is currently unemployed. Patient is living with her dad part time. Patient is single.   PROMIS (reviewed every 90 days):The following assessments were completed by patient for this visit:  PROMIS 10-Global Health (only subscores and total score):       7/11/2023     4:12 PM 10/13/2023    12:54 AM 10/26/2023    10:36 PM 11/20/2023     2:17 PM 1/19/2024    11:39 AM 2/29/2024    12:43 PM 4/4/2024    12:44 PM   PROMIS-10 Scores Only   Global Mental Health Score 11 10 9 9 10 11 11   Global Physical Health Score 16 16 15 16 17 14 15   PROMIS TOTAL - SUBSCORES 27 26 24 25 27 25 26        Referral / Collaboration:  Referral to another professional/service is not indicated at this time.    Anticipated number of session for this episode of care:  50  Anticipation frequency of session: Every other week  Anticipated Duration of each session: 53 or more minutes  Treatment plan will be reviewed in 90 days or when goals have been changed.  There has been demonstrated improvement in functioning while patient has been engaged in psychotherapy/psychological service- if withdrawn the patient would deteriorate and/or relapse.     MeasurableTreatment Goal(s) related to diagnosis / functional impairment(s)  Goal 1: Patient will work on stabilizing symptoms of ADHD.      Objective #A  Patient will learn about the diagnosis and symptoms of ADHD.   Status: Continued - Date(s): 4/4/2024    Intervention(s)  Therapist will teach about the diagnosis of ADHD.     Objective #B  Patient will practice setting goals and completing them.                         Status: Continued - Date(s): 4/4/2024     Intervention(s)  Therapist will teach SMART goals and how to break tasks down into smaller tasks.     Objective #C  Patient will learn 3 skills to help increase motivation and organization.   Status: Continued - Date(s): 4/4/2024    Intervention(s)  Therapist will assign homework of using coping skills to increase motivation and organizational skills.    Objective #D  Patient will learn about how ADHD can impact social interactions and interpersonal relationships.  Status:Continued - Date(s): 4/4/2024    Intervention(s)  Therapist will teach about how ADHD can impact social interactions and interpersonal relationships.     Objective #D  Patient will create and adhere to a routine.  Status:New - Date: 4/18/2024    Intervention(s)  Therapist will assign homework to create a routine and teach about how to maintain routine.         Goal 2: Client will work on stabilizing anxiety symptoms as evidenced by LYUBOV-7 scores (current is 7) and Self report.  Goal is to reduce by five points.      I will know I've met my goal when I can better manage my anxiety .      Objective #A Client will identify triggers of anxiety and process them in session.    Status: Continued - Date(s): 4/4/2024    Intervention(s)  Therapist will teach emotional recognition/identification by assigning homework  to journal with written exercises.    Objective #B  Client will identify initial signs or symptoms of anxiety.    Status: Continued - Date(s): 4/4/2024    Intervention(s)  Therapist will teach emotional regulation skills, such as deep breathing and mindfulness skills.    Objective #C  Client will learn about co-morbidities between ADHD and LYUBOV.   Status: Continued - Date(s): 4/4/2024    Intervention(s)  Therapist will provide educational materials on symptoms of ADHD and LYUBOV.          Patient has reviewed and agreed to the above plan.      TYE Marie  July 28, 2023  Reviewed 10/27/2023  Re-reviewed 1/19/2024 Re-reviewed 4/4/2024

## 2024-06-20 ENCOUNTER — VIRTUAL VISIT (OUTPATIENT)
Dept: PSYCHOLOGY | Facility: CLINIC | Age: 49
End: 2024-06-20
Payer: COMMERCIAL

## 2024-06-20 DIAGNOSIS — F41.1 GAD (GENERALIZED ANXIETY DISORDER): ICD-10-CM

## 2024-06-20 DIAGNOSIS — F90.0 ADHD (ATTENTION DEFICIT HYPERACTIVITY DISORDER), INATTENTIVE TYPE: Primary | ICD-10-CM

## 2024-06-20 PROCEDURE — 90837 PSYTX W PT 60 MINUTES: CPT | Mod: 95

## 2024-06-20 ASSESSMENT — ANXIETY QUESTIONNAIRES
3. WORRYING TOO MUCH ABOUT DIFFERENT THINGS: SEVERAL DAYS
IF YOU CHECKED OFF ANY PROBLEMS ON THIS QUESTIONNAIRE, HOW DIFFICULT HAVE THESE PROBLEMS MADE IT FOR YOU TO DO YOUR WORK, TAKE CARE OF THINGS AT HOME, OR GET ALONG WITH OTHER PEOPLE: SOMEWHAT DIFFICULT
2. NOT BEING ABLE TO STOP OR CONTROL WORRYING: NOT AT ALL
1. FEELING NERVOUS, ANXIOUS, OR ON EDGE: SEVERAL DAYS
GAD7 TOTAL SCORE: 9
GAD7 TOTAL SCORE: 9
5. BEING SO RESTLESS THAT IT IS HARD TO SIT STILL: SEVERAL DAYS
7. FEELING AFRAID AS IF SOMETHING AWFUL MIGHT HAPPEN: NOT AT ALL
6. BECOMING EASILY ANNOYED OR IRRITABLE: NEARLY EVERY DAY
4. TROUBLE RELAXING: NEARLY EVERY DAY

## 2024-06-20 ASSESSMENT — COLUMBIA-SUICIDE SEVERITY RATING SCALE - C-SSRS
SUICIDE, SINCE LAST CONTACT: NO
1. SINCE LAST CONTACT, HAVE YOU WISHED YOU WERE DEAD OR WISHED YOU COULD GO TO SLEEP AND NOT WAKE UP?: NO
TOTAL  NUMBER OF INTERRUPTED ATTEMPTS SINCE LAST CONTACT: NO
TOTAL  NUMBER OF ABORTED OR SELF INTERRUPTED ATTEMPTS SINCE LAST CONTACT: NO
2. HAVE YOU ACTUALLY HAD ANY THOUGHTS OF KILLING YOURSELF?: NO
6. HAVE YOU EVER DONE ANYTHING, STARTED TO DO ANYTHING, OR PREPARED TO DO ANYTHING TO END YOUR LIFE?: NO
ATTEMPT SINCE LAST CONTACT: NO

## 2024-06-20 ASSESSMENT — PATIENT HEALTH QUESTIONNAIRE - PHQ9: SUM OF ALL RESPONSES TO PHQ QUESTIONS 1-9: 13

## 2024-06-20 NOTE — PROGRESS NOTES
[facility]                                     Progress Note    Patient Name: Tressa Jeong  Date: 6/20/2024      Service Type: Individual      Session Start Time: 12:28 PM  Session End Time: 1:24 PM     Session Length: 53+ min    Session #: 22    Attendees: Client attended alone    Service Modality:  Video Visit:      Provider verified identity through the following two step process.  Patient provided:  Patient is known previously to provider    Telemedicine Visit: The patient's condition can be safely assessed and treated via synchronous audio and visual telemedicine encounter.      Reason for Telemedicine Visit: Patient has requested telehealth visit    Originating Site (Patient Location): Patient's home    Distant Site (Provider Location): Provider Remote Setting- Home Office    Consent:  The patient/guardian has verbally consented to: the potential risks and benefits of telemedicine (video visit) versus in person care; bill my insurance or make self-payment for services provided; and responsibility for payment of non-covered services.     Patient would like the video invitation sent by:  My Chart    Mode of Communication:  Video Conference via Amwell    Distant Location (Provider):  Off-site    As the provider I attest to compliance with applicable laws and regulations related to telemedicine.    DATA  Extended Session (53+ minutes): PROLONGED SERVICE IN THE OUTPATIENT SETTING REQUIRING DIRECT (FACE-TO-FACE) PATIENT CONTACT BEYOND THE USUAL SERVICE:    - Patient's presenting concerns require more intensive intervention than could be completed within the usual service  Interactive Complexity: No  Crisis: No        Progress Since Last Session (Related to Symptoms / Goals / Homework):   Symptoms: Worsening based on PHQ-9 and LYUBOV-7 scores and patient self-report.     Homework: Partially completed      Episode of Care Goals: Minimal progress - PREPARATION (Decided to change - considering  how); Intervened by negotiating a change plan and determining options / strategies for behavior change, identifying triggers, exploring social supports, and working towards setting a date to begin behavior change     Current / Ongoing Stressors and Concerns:   Patient reports she is concerned about ADHD symptoms. Patient reports she is concerned about motivation. Patient reports she is concerned about working. Patient reports she is concerned about task completion.  Patient reports she is concerned about prioritizing tasks. Patient reports her dad is home for the winter. Patient reports concerns about re-entering the workforce. Patient reports she is concerned about her sleep habits. Patient reports she is concerned about productivity. Patient reports concerns about organization. Patient reports she is job searching. Patient reports concerns about an internal conflict.      Treatment Objective(s) Addressed in This Session:   Patient will learn about the diagnosis and symptoms of ADHD.   Patient will learn about how ADHD can impact social interactions and interpersonal relationships.  Client will identify triggers of anxiety and process them in session.    Client will identify initial signs or symptoms of anxiety.   Patient will learn 3 skills to help increase motivation and organization.     Intervention:   Motivational Interviewing: supported patient in processing and exploring an internal conflict.   Validated patient's emotions. Supported patient in exploring how her dad being around has increased symptoms of anxiety. Supported patient in exploring how she amy with symptoms of anxiety. Discussed how patient is not responsible for others' emotions and behaviors. Discussed using her coping skills to manage symptoms of anxiety and being mindful of how her dad being present impacts these symptoms.     Reviewed treatment plan and goals with patient.      Assessments completed prior to visit:  The following  assessments were completed by patient for this visit:  PHQ9:       11/15/2022    11:10 AM 7/11/2023     3:49 PM 10/27/2023    11:00 AM 12/8/2023    10:45 AM 1/19/2024    11:00 AM 4/4/2024    12:00 PM 6/20/2024    12:00 PM   PHQ-9 SCORE   PHQ-9 Total Score MyChart  4 (Minimal depression)  10 (Moderate depression)      PHQ-9 Total Score 15 4 15 10 9 11 13     GAD7:       11/20/2023     2:00 PM 1/5/2024    11:00 AM 1/19/2024    11:00 AM 3/1/2024    11:00 AM 4/4/2024    12:00 PM 5/2/2024     1:00 PM 6/20/2024    12:00 PM   LYUBOV-7 SCORE   Total Score 12 9 11 7 7 7 9     PROMIS 10-Global Health (only subscores and total score):       10/13/2023    12:54 AM 10/26/2023    10:36 PM 11/20/2023     2:17 PM 1/19/2024    11:39 AM 2/29/2024    12:43 PM 4/4/2024    12:44 PM 6/20/2024    12:33 PM   PROMIS-10 Scores Only   Global Mental Health Score 10 9 9 10 11 11 10   Global Physical Health Score 16 15 16 17 14 15 16   PROMIS TOTAL - SUBSCORES 26 24 25 27 25 26 26         ASSESSMENT: Current Emotional / Mental Status (status of significant symptoms):   Risk status (Self / Other harm or suicidal ideation)   Patient denies current fears or concerns for personal safety.   Patient denies current or recent suicidal ideation or behaviors.   Patient denies current or recent homicidal ideation or behaviors.   Patient denies current or recent self injurious behavior or ideation.   Patient denies other safety concerns.   Patient reports there has been no change in risk factors since their last session.     Patient reports there has been no change in protective factors since their last session.     Recommended that patient call 911 or go to the local ED should there be a change in any of these risk factors.     Appearance:   Appropriate    Eye Contact:   Good    Psychomotor Behavior: Normal    Attitude:   Cooperative    Orientation:   All   Speech    Rate / Production: Hyperverbal     Volume:  Normal    Mood:    Anxious     Affect:    Worrisome    Thought Content:  Rumination    Thought Form:  Logical    Insight:    Good      Medication Review:   No changes to current psychiatric medication(s)     Medication Compliance:   Yes     Changes in Health Issues:   None reported     Chemical Use Review:   Substance Use: Chemical use reviewed, no active concerns identified      Tobacco Use: No current tobacco use.      Diagnosis:  1. ADHD (attention deficit hyperactivity disorder), inattentive type    2. LYUBOV (generalized anxiety disorder)        Collateral Reports Completed:   Not Applicable    PLAN: (Patient Tasks / Therapist Tasks / Other)  Patient will use her coping skills to reduce symptoms of anxiety. Patient will be mindful of how her dad's presence impacts symptoms of anxiety.   We will discuss next session.         Aline Jimenez, [facility] 6/20/2024    ___________________________________________________________                                              Individual Treatment Plan    Patient's Name: Tressa Jeong  YOB: 1975    Date of Creation: 7/28/2023  Date Treatment Plan Last Reviewed/Revised: 6/20/2024  DSM5 Diagnoses: Attention-Deficit/Hyperactivity Disorder  314.00 (F90.0) Predominantly inattentive presentation or 300.02 (F41.1) Generalized Anxiety Disorder  Psychosocial / Contextual Factors: Patient is currently unemployed. Patient is living with her dad part time. Patient is single.   PROMIS (reviewed every 90 days):The following assessments were completed by patient for this visit:  PROMIS 10-Global Health (only subscores and total score):       10/13/2023    12:54 AM 10/26/2023    10:36 PM 11/20/2023     2:17 PM 1/19/2024    11:39 AM 2/29/2024    12:43 PM 4/4/2024    12:44 PM 6/20/2024    12:33 PM   PROMIS-10 Scores Only   Global Mental Health Score 10 9 9 10 11 11 10   Global Physical Health Score 16 15 16 17 14 15 16   PROMIS TOTAL - SUBSCORES 26 24 25 27 25 26 26        Referral / Collaboration:  Referral to  another professional/service is not indicated at this time.    Anticipated number of session for this episode of care:  50  Anticipation frequency of session: Every other week  Anticipated Duration of each session: 53 or more minutes  Treatment plan will be reviewed in 90 days or when goals have been changed. There has been demonstrated improvement in functioning while patient has been engaged in psychotherapy/psychological service- if withdrawn the patient would deteriorate and/or relapse.     MeasurableTreatment Goal(s) related to diagnosis / functional impairment(s)  Goal 1: Patient will work on stabilizing symptoms of ADHD.      Objective #A  Patient will learn about the diagnosis and symptoms of ADHD.   Status: Continued - Date(s): 6/20/2024    Intervention(s)  Therapist will teach about the diagnosis of ADHD.     Objective #B  Patient will practice setting goals and completing them.                         Status: Continued - Date(s): 6/20/2024     Intervention(s)  Therapist will teach SMART goals and how to break tasks down into smaller tasks.     Objective #C  Patient will learn 3 skills to help increase motivation and organization.   Status: Continued - Date(s): 6/20/2024    Intervention(s)  Therapist will assign homework of using coping skills to increase motivation and organizational skills.    Objective #D  Patient will learn about how ADHD can impact social interactions and interpersonal relationships.  Status:Continued - Date(s): 6/20/2024    Intervention(s)  Therapist will teach about how ADHD can impact social interactions and interpersonal relationships.     Objective #D  Patient will create and adhere to a routine.  Status:Continued - Date(s): 6/20/2024    Intervention(s)  Therapist will assign homework to create a routine and teach about how to maintain routine.         Goal 2: Client will work on stabilizing anxiety symptoms as evidenced by LYUBOV-7 scores (current is 7) and Self report.  Goal is to  reduce by three points.      I will know I've met my goal when I can better manage my anxiety .      Objective #A Client will identify triggers of anxiety and process them in session.    Status: Continued - Date(s): 6/20/2024    Intervention(s)  Therapist will teach emotional recognition/identification by assigning homework to journal with written exercises.    Objective #B  Client will identify initial signs or symptoms of anxiety.    Status: Continued - Date(s): 6/20/2024    Intervention(s)  Therapist will teach emotional regulation skills, such as deep breathing and mindfulness skills.    Objective #C  Client will learn about co-morbidities between ADHD and LYUBOV.   Status: Continued - Date(s): 6/20/2024    Intervention(s)  Therapist will provide educational materials on symptoms of ADHD and LYUBOV.          Patient has reviewed and agreed to the above plan.      TYE Marie  July 28, 2023  Reviewed 10/27/2023  Re-reviewed 1/19/2024 Re-reviewed 4/4/2024  Re-reviewed 6/20/2024

## 2024-07-12 ENCOUNTER — VIRTUAL VISIT (OUTPATIENT)
Dept: PSYCHOLOGY | Facility: CLINIC | Age: 49
End: 2024-07-12
Payer: COMMERCIAL

## 2024-07-12 DIAGNOSIS — F90.0 ADHD (ATTENTION DEFICIT HYPERACTIVITY DISORDER), INATTENTIVE TYPE: Primary | ICD-10-CM

## 2024-07-12 DIAGNOSIS — F41.1 GAD (GENERALIZED ANXIETY DISORDER): ICD-10-CM

## 2024-07-12 PROCEDURE — 90837 PSYTX W PT 60 MINUTES: CPT | Mod: 95

## 2024-07-25 ENCOUNTER — VIRTUAL VISIT (OUTPATIENT)
Dept: PSYCHOLOGY | Facility: CLINIC | Age: 49
End: 2024-07-25
Payer: COMMERCIAL

## 2024-07-25 DIAGNOSIS — F41.1 GAD (GENERALIZED ANXIETY DISORDER): ICD-10-CM

## 2024-07-25 DIAGNOSIS — F90.0 ADHD (ATTENTION DEFICIT HYPERACTIVITY DISORDER), INATTENTIVE TYPE: Primary | ICD-10-CM

## 2024-07-25 PROCEDURE — 90837 PSYTX W PT 60 MINUTES: CPT | Mod: 95

## 2024-07-25 ASSESSMENT — ANXIETY QUESTIONNAIRES
1. FEELING NERVOUS, ANXIOUS, OR ON EDGE: SEVERAL DAYS
6. BECOMING EASILY ANNOYED OR IRRITABLE: NEARLY EVERY DAY
4. TROUBLE RELAXING: NEARLY EVERY DAY
7. FEELING AFRAID AS IF SOMETHING AWFUL MIGHT HAPPEN: NOT AT ALL
2. NOT BEING ABLE TO STOP OR CONTROL WORRYING: SEVERAL DAYS
IF YOU CHECKED OFF ANY PROBLEMS ON THIS QUESTIONNAIRE, HOW DIFFICULT HAVE THESE PROBLEMS MADE IT FOR YOU TO DO YOUR WORK, TAKE CARE OF THINGS AT HOME, OR GET ALONG WITH OTHER PEOPLE: SOMEWHAT DIFFICULT
5. BEING SO RESTLESS THAT IT IS HARD TO SIT STILL: NEARLY EVERY DAY
3. WORRYING TOO MUCH ABOUT DIFFERENT THINGS: NEARLY EVERY DAY
GAD7 TOTAL SCORE: 14
GAD7 TOTAL SCORE: 14

## 2024-07-25 NOTE — PROGRESS NOTES
[facility]                                     Progress Note    Patient Name: Tressa Jeong  Date: 7/25/2024      Service Type: Individual      Session Start Time: 12:29 PM  Session End Time: 1:22 PM     Session Length: 53+ min    Session #: 24    Attendees: Client attended alone    Service Modality:  Video Visit:      Provider verified identity through the following two step process.  Patient provided:  Patient is known previously to provider    Telemedicine Visit: The patient's condition can be safely assessed and treated via synchronous audio and visual telemedicine encounter.      Reason for Telemedicine Visit: Patient has requested telehealth visit    Originating Site (Patient Location): Patient's home    Distant Site (Provider Location): Provider Remote Setting- Home Office    Consent:  The patient/guardian has verbally consented to: the potential risks and benefits of telemedicine (video visit) versus in person care; bill my insurance or make self-payment for services provided; and responsibility for payment of non-covered services.     Patient would like the video invitation sent by:  My Chart    Mode of Communication:  Video Conference via Amwell    Distant Location (Provider):  Off-site    As the provider I attest to compliance with applicable laws and regulations related to telemedicine.    DATA  Extended Session (53+ minutes): PROLONGED SERVICE IN THE OUTPATIENT SETTING REQUIRING DIRECT (FACE-TO-FACE) PATIENT CONTACT BEYOND THE USUAL SERVICE:    - Patient's presenting concerns require more intensive intervention than could be completed within the usual service  Interactive Complexity: No  Crisis: No        Progress Since Last Session (Related to Symptoms / Goals / Homework):   Symptoms: Worsening based on LYUBOV-7 scores and patient self-report.    Homework: Partially completed      Episode of Care Goals: Minimal progress - PREPARATION (Decided to change - considering how);  Intervened by negotiating a change plan and determining options / strategies for behavior change, identifying triggers, exploring social supports, and working towards setting a date to begin behavior change     Current / Ongoing Stressors and Concerns:   Patient reports she is concerned about ADHD symptoms. Patient reports she is concerned about motivation. Patient reports she is concerned about working. Patient reports she is concerned about task completion.  Patient reports she is concerned about prioritizing tasks. Patient reports her dad is home for the winter. Patient reports concerns about re-entering the workforce. Patient reports she is concerned about her sleep habits. Patient reports she is concerned about productivity. Patient reports concerns about organization. Patient reports she is job searching. Patient reports concerns about an internal conflict. Patient reports her dad went back to [place]. Patient's mom was hospitalized.      Treatment Objective(s) Addressed in This Session:   Patient will learn about the diagnosis and symptoms of ADHD.   Patient will learn about how ADHD can impact social interactions and interpersonal relationships.  Client will identify triggers of anxiety and process them in session.    Client will identify initial signs or symptoms of anxiety.   Patient will learn 3 skills to help increase motivation and organization.     Intervention:   Motivational Interviewing: supported patient in processing and exploring an internal conflict.   Validated patient's emotions. Supported patient in identifying her emotions about the situation. Supported patient in processing and exploring the news about her mom being in the hospital. Reviewed effective communication skills and how to set and maintain boundaries. Role played a conversation between patient and her mom where patient uses her skills. Supported malikn in exploring concerns about her sleep habits. Discussed tracking her sleep  habits to find patterns.      Assessments completed prior to visit:  The following assessments were completed by patient for this visit:  PHQ9:       11/15/2022    11:10 AM 7/11/2023     3:49 PM 10/27/2023    11:00 AM 12/8/2023    10:45 AM 1/19/2024    11:00 AM 4/4/2024    12:00 PM 6/20/2024    12:00 PM   PHQ-9 SCORE   PHQ-9 Total Score MyChart  4 (Minimal depression)  10 (Moderate depression)      PHQ-9 Total Score 15 4 15 10 9 11 13     GAD7:       1/5/2024    11:00 AM 1/19/2024    11:00 AM 3/1/2024    11:00 AM 4/4/2024    12:00 PM 5/2/2024     1:00 PM 6/20/2024    12:00 PM 7/25/2024    12:00 PM   LYUBOV-7 SCORE   Total Score 9 11 7 7 7 9 14     PROMIS 10-Global Health (only subscores and total score):       10/13/2023    12:54 AM 10/26/2023    10:36 PM 11/20/2023     2:17 PM 1/19/2024    11:39 AM 2/29/2024    12:43 PM 4/4/2024    12:44 PM 6/20/2024    12:33 PM   PROMIS-10 Scores Only   Global Mental Health Score 10 9 9 10 11 11 10   Global Physical Health Score 16 15 16 17 14 15 16   PROMIS TOTAL - SUBSCORES 26 24 25 27 25 26 26         ASSESSMENT: Current Emotional / Mental Status (status of significant symptoms):   Risk status (Self / Other harm or suicidal ideation)   Patient denies current fears or concerns for personal safety.   Patient denies current or recent suicidal ideation or behaviors.   Patient denies current or recent homicidal ideation or behaviors.   Patient denies current or recent self injurious behavior or ideation.   Patient denies other safety concerns.   Patient reports there has been no change in risk factors since their last session.     Patient reports there has been no change in protective factors since their last session.     Recommended that patient call 911 or go to the local ED should there be a change in any of these risk factors.     Appearance:   Appropriate    Eye Contact:   Good    Psychomotor Behavior: Normal    Attitude:   Cooperative    Orientation:   All   Speech    Rate /  Production: Hyperverbal     Volume:  Normal    Mood:    Anxious    Affect:    Worrisome    Thought Content:  Rumination    Thought Form:  Logical    Insight:    Good      Medication Review:   No changes to current psychiatric medication(s)     Medication Compliance:   Yes     Changes in Health Issues:   None reported     Chemical Use Review:   Substance Use: Chemical use reviewed, no active concerns identified      Tobacco Use: No current tobacco use.      Diagnosis:  1. ADHD (attention deficit hyperactivity disorder), inattentive type    2. LYUBOV (generalized anxiety disorder)        Collateral Reports Completed:   Not Applicable    PLAN: (Patient Tasks / Therapist Tasks / Other)  Patient will practice effective communication skills with her mom. Patient will track her sleeping habits. We will discuss next session.         Aline Jimenez, [facility] 7/25/2024    ___________________________________________________________                                              Individual Treatment Plan    Patient's Name: Tressa Jeong  YOB: 1975    Date of Creation: 7/28/2023  Date Treatment Plan Last Reviewed/Revised: 6/20/2024  DSM5 Diagnoses: Attention-Deficit/Hyperactivity Disorder  314.00 (F90.0) Predominantly inattentive presentation or 300.02 (F41.1) Generalized Anxiety Disorder  Psychosocial / Contextual Factors: Patient is currently unemployed. Patient is living with her dad part time. Patient is single.   PROMIS (reviewed every 90 days):The following assessments were completed by patient for this visit:  PROMIS 10-Global Health (only subscores and total score):       10/13/2023    12:54 AM 10/26/2023    10:36 PM 11/20/2023     2:17 PM 1/19/2024    11:39 AM 2/29/2024    12:43 PM 4/4/2024    12:44 PM 6/20/2024    12:33 PM   PROMIS-10 Scores Only   Global Mental Health Score 10 9 9 10 11 11 10   Global Physical Health Score 16 15 16 17 14 15 16   PROMIS TOTAL - SUBSCORES 26 24 25 27 25 26 26        Referral /  Collaboration:  Referral to another professional/service is not indicated at this time.    Anticipated number of session for this episode of care:  50  Anticipation frequency of session: Every other week  Anticipated Duration of each session: 53 or more minutes  Treatment plan will be reviewed in 90 days or when goals have been changed. There has been demonstrated improvement in functioning while patient has been engaged in psychotherapy/psychological service- if withdrawn the patient would deteriorate and/or relapse.     MeasurableTreatment Goal(s) related to diagnosis / functional impairment(s)  Goal 1: Patient will work on stabilizing symptoms of ADHD.      Objective #A  Patient will learn about the diagnosis and symptoms of ADHD.   Status: Continued - Date(s): 6/20/2024    Intervention(s)  Therapist will teach about the diagnosis of ADHD.     Objective #B  Patient will practice setting goals and completing them.                         Status: Continued - Date(s): 6/20/2024     Intervention(s)  Therapist will teach SMART goals and how to break tasks down into smaller tasks.     Objective #C  Patient will learn 3 skills to help increase motivation and organization.   Status: Continued - Date(s): 6/20/2024    Intervention(s)  Therapist will assign homework of using coping skills to increase motivation and organizational skills.    Objective #D  Patient will learn about how ADHD can impact social interactions and interpersonal relationships.  Status:Continued - Date(s): 6/20/2024    Intervention(s)  Therapist will teach about how ADHD can impact social interactions and interpersonal relationships.     Objective #D  Patient will create and adhere to a routine.  Status:Continued - Date(s): 6/20/2024    Intervention(s)  Therapist will assign homework to create a routine and teach about how to maintain routine.         Goal 2: Client will work on stabilizing anxiety symptoms as evidenced by LYUBOV-7 scores (current is 7)  and Self report.  Goal is to reduce by three points.      I will know I've met my goal when I can better manage my anxiety .      Objective #A Client will identify triggers of anxiety and process them in session.    Status: Continued - Date(s): 6/20/2024    Intervention(s)  Therapist will teach emotional recognition/identification by assigning homework to journal with written exercises.    Objective #B  Client will identify initial signs or symptoms of anxiety.    Status: Continued - Date(s): 6/20/2024    Intervention(s)  Therapist will teach emotional regulation skills, such as deep breathing and mindfulness skills.    Objective #C  Client will learn about co-morbidities between ADHD and LYUBOV.   Status: Continued - Date(s): 6/20/2024    Intervention(s)  Therapist will provide educational materials on symptoms of ADHD and LYUBOV.          Patient has reviewed and agreed to the above plan.      TYE Marie  July 28, 2023  Reviewed 10/27/2023  Re-reviewed 1/19/2024 Re-reviewed 4/4/2024  Re-reviewed 6/20/2024

## 2024-08-08 ENCOUNTER — VIRTUAL VISIT (OUTPATIENT)
Dept: PSYCHOLOGY | Facility: CLINIC | Age: 49
End: 2024-08-08
Payer: COMMERCIAL

## 2024-08-08 DIAGNOSIS — F90.0 ADHD (ATTENTION DEFICIT HYPERACTIVITY DISORDER), INATTENTIVE TYPE: Primary | ICD-10-CM

## 2024-08-08 DIAGNOSIS — F41.1 GAD (GENERALIZED ANXIETY DISORDER): ICD-10-CM

## 2024-08-08 PROCEDURE — 90837 PSYTX W PT 60 MINUTES: CPT | Mod: 95

## 2024-08-08 NOTE — PROGRESS NOTES
[facility]                                     Progress Note    Patient Name: Tressa Jeong  Date: 8/8/2024      Service Type: Individual      Session Start Time: 12:31 PM  Session End Time: 1:25 PM     Session Length: 53+ min    Session #: 25    Attendees: Client attended alone    Service Modality:  Video Visit:      Provider verified identity through the following two step process.  Patient provided:  Patient is known previously to provider    Telemedicine Visit: The patient's condition can be safely assessed and treated via synchronous audio and visual telemedicine encounter.      Reason for Telemedicine Visit: Patient has requested telehealth visit    Originating Site (Patient Location): Patient's home    Distant Site (Provider Location): Provider Remote Setting- Home Office    Consent:  The patient/guardian has verbally consented to: the potential risks and benefits of telemedicine (video visit) versus in person care; bill my insurance or make self-payment for services provided; and responsibility for payment of non-covered services.     Patient would like the video invitation sent by:  My Chart    Mode of Communication:  Video Conference via [employer]    Distant Location (Provider):  Off-site    As the provider I attest to compliance with applicable laws and regulations related to telemedicine.    DATA  Extended Session (53+ minutes): PROLONGED SERVICE IN THE OUTPATIENT SETTING REQUIRING DIRECT (FACE-TO-FACE) PATIENT CONTACT BEYOND THE USUAL SERVICE:    - Patient's presenting concerns require more intensive intervention than could be completed within the usual service  Interactive Complexity: No  Crisis: No        Progress Since Last Session (Related to Symptoms / Goals / Homework):   Symptoms: No change based on patient self-report.     Homework: Partially completed      Episode of Care Goals: Minimal progress - PREPARATION (Decided to change - considering how); Intervened by negotiating  a change plan and determining options / strategies for behavior change, identifying triggers, exploring social supports, and working towards setting a date to begin behavior change     Current / Ongoing Stressors and Concerns:   Patient reports she is concerned about ADHD symptoms. Patient reports she is concerned about motivation. Patient reports she is concerned about working. Patient reports she is concerned about task completion.  Patient reports she is concerned about prioritizing tasks. Patient reports her dad is home for the winter. Patient reports concerns about re-entering the workforce. Patient reports she is concerned about her sleep habits. Patient reports she is concerned about productivity. Patient reports concerns about organization. Patient reports she is job searching. Patient reports concerns about an internal conflict. Patient reports her dad went back to [place]. Patient's mom was hospitalized.      Treatment Objective(s) Addressed in This Session:   Patient will learn about the diagnosis and symptoms of ADHD.   Patient will learn about how ADHD can impact social interactions and interpersonal relationships.  Client will identify triggers of anxiety and process them in session.    Client will identify initial signs or symptoms of anxiety.   Patient will learn 3 skills to help increase motivation and organization.     Intervention:   Motivational Interviewing: supported patient in processing and exploring concerns about her sleep habits.   Validated patient's emotions. Supported patient in exploring her current sleep patterns. Supported patient in processing and exploring results from her sleep study and what she still uses to help her sleep habits. Supported patient in exploring self-judgments related to sleep habits. Discussed bedtime routines and how they can help reduce time spent falling asleep. Supported patient in creating a bedtime routine and discussed implementing this.         Assessments completed prior to visit:  The following assessments were completed by patient for this visit:  PHQ9:       11/15/2022    11:10 AM 7/11/2023     3:49 PM 10/27/2023    11:00 AM 12/8/2023    10:45 AM 1/19/2024    11:00 AM 4/4/2024    12:00 PM 6/20/2024    12:00 PM   PHQ-9 SCORE   PHQ-9 Total Score MyChart  4 (Minimal depression)  10 (Moderate depression)      PHQ-9 Total Score 15 4 15 10 9 11 13     GAD7:       1/5/2024    11:00 AM 1/19/2024    11:00 AM 3/1/2024    11:00 AM 4/4/2024    12:00 PM 5/2/2024     1:00 PM 6/20/2024    12:00 PM 7/25/2024    12:00 PM   LYUBOV-7 SCORE   Total Score 9 11 7 7 7 9 14     PROMIS 10-Global Health (only subscores and total score):       10/13/2023    12:54 AM 10/26/2023    10:36 PM 11/20/2023     2:17 PM 1/19/2024    11:39 AM 2/29/2024    12:43 PM 4/4/2024    12:44 PM 6/20/2024    12:33 PM   PROMIS-10 Scores Only   Global Mental Health Score 10 9 9 10 11 11 10   Global Physical Health Score 16 15 16 17 14 15 16   PROMIS TOTAL - SUBSCORES 26 24 25 27 25 26 26         ASSESSMENT: Current Emotional / Mental Status (status of significant symptoms):   Risk status (Self / Other harm or suicidal ideation)   Patient denies current fears or concerns for personal safety.   Patient denies current or recent suicidal ideation or behaviors.   Patient denies current or recent homicidal ideation or behaviors.   Patient denies current or recent self injurious behavior or ideation.   Patient denies other safety concerns.   Patient reports there has been no change in risk factors since their last session.     Patient reports there has been no change in protective factors since their last session.     Recommended that patient call 911 or go to the local ED should there be a change in any of these risk factors.     Appearance:   Appropriate    Eye Contact:   Good    Psychomotor Behavior: Normal    Attitude:   Cooperative    Orientation:   All   Speech    Rate / Production: Hyperverbal      Volume:  Normal    Mood:    Anxious    Affect:    Worrisome    Thought Content:  Rumination    Thought Form:  Logical    Insight:    Good      Medication Review:   No changes to current psychiatric medication(s)     Medication Compliance:   Yes     Changes in Health Issues:   None reported     Chemical Use Review:   Substance Use: Chemical use reviewed, no active concerns identified      Tobacco Use: No current tobacco use.      Diagnosis:  1. ADHD (attention deficit hyperactivity disorder), inattentive type    2. LYUBOV (generalized anxiety disorder)        Collateral Reports Completed:   Not Applicable    PLAN: (Patient Tasks / Therapist Tasks / Other)  Patient will implement her bedtime routine and be mindful of how this impacts time spent falling asleep.  We will discuss next session.         Aline Jimenez, [facility] 8/8/2024    ___________________________________________________________                                              Individual Treatment Plan    Patient's Name: Tressa Jeong  YOB: 1975    Date of Creation: 7/28/2023  Date Treatment Plan Last Reviewed/Revised: 6/20/2024  DSM5 Diagnoses: Attention-Deficit/Hyperactivity Disorder  314.00 (F90.0) Predominantly inattentive presentation or 300.02 (F41.1) Generalized Anxiety Disorder  Psychosocial / Contextual Factors: Patient is currently unemployed. Patient is living with her dad part time. Patient is single.   PROMIS (reviewed every 90 days):The following assessments were completed by patient for this visit:  PROMIS 10-Global Health (only subscores and total score):       10/13/2023    12:54 AM 10/26/2023    10:36 PM 11/20/2023     2:17 PM 1/19/2024    11:39 AM 2/29/2024    12:43 PM 4/4/2024    12:44 PM 6/20/2024    12:33 PM   PROMIS-10 Scores Only   Global Mental Health Score 10 9 9 10 11 11 10   Global Physical Health Score 16 15 16 17 14 15 16   PROMIS TOTAL - SUBSCORES 26 24 25 27 25 26 26        Referral / Collaboration:  Referral to  another professional/service is not indicated at this time.    Anticipated number of session for this episode of care:  50  Anticipation frequency of session: Every other week  Anticipated Duration of each session: 53 or more minutes  Treatment plan will be reviewed in 90 days or when goals have been changed. There has been demonstrated improvement in functioning while patient has been engaged in psychotherapy/psychological service- if withdrawn the patient would deteriorate and/or relapse.     MeasurableTreatment Goal(s) related to diagnosis / functional impairment(s)  Goal 1: Patient will work on stabilizing symptoms of ADHD.      Objective #A  Patient will learn about the diagnosis and symptoms of ADHD.   Status: Continued - Date(s): 6/20/2024    Intervention(s)  Therapist will teach about the diagnosis of ADHD.     Objective #B  Patient will practice setting goals and completing them.                         Status: Continued - Date(s): 6/20/2024     Intervention(s)  Therapist will teach SMART goals and how to break tasks down into smaller tasks.     Objective #C  Patient will learn 3 skills to help increase motivation and organization.   Status: Continued - Date(s): 6/20/2024    Intervention(s)  Therapist will assign homework of using coping skills to increase motivation and organizational skills.    Objective #D  Patient will learn about how ADHD can impact social interactions and interpersonal relationships.  Status:Continued - Date(s): 6/20/2024    Intervention(s)  Therapist will teach about how ADHD can impact social interactions and interpersonal relationships.     Objective #D  Patient will create and adhere to a routine.  Status:Continued - Date(s): 6/20/2024    Intervention(s)  Therapist will assign homework to create a routine and teach about how to maintain routine.         Goal 2: Client will work on stabilizing anxiety symptoms as evidenced by LYUBOV-7 scores (current is 7) and Self report.  Goal is to  reduce by three points.      I will know I've met my goal when I can better manage my anxiety .      Objective #A Client will identify triggers of anxiety and process them in session.    Status: Continued - Date(s): 6/20/2024    Intervention(s)  Therapist will teach emotional recognition/identification by assigning homework to journal with written exercises.    Objective #B  Client will identify initial signs or symptoms of anxiety.    Status: Continued - Date(s): 6/20/2024    Intervention(s)  Therapist will teach emotional regulation skills, such as deep breathing and mindfulness skills.    Objective #C  Client will learn about co-morbidities between ADHD and LYUBOV.   Status: Continued - Date(s): 6/20/2024    Intervention(s)  Therapist will provide educational materials on symptoms of ADHD and LYUBOV.          Patient has reviewed and agreed to the above plan.      TYE Marie  July 28, 2023  Reviewed 10/27/2023  Re-reviewed 1/19/2024 Re-reviewed 4/4/2024  Re-reviewed 6/20/2024

## 2024-08-12 ENCOUNTER — TELEPHONE (OUTPATIENT)
Dept: FAMILY MEDICINE | Facility: CLINIC | Age: 49
End: 2024-08-12
Payer: COMMERCIAL

## 2024-08-12 NOTE — TELEPHONE ENCOUNTER
Patient Quality Outreach    Patient is due for the following:   Breast Cancer Screening - Mammogram    Next Steps:   Schedule a Adult Preventative    Type of outreach:    Sent [employer] message.      Questions for provider review:    None           Andrea Behrend

## 2024-08-22 ENCOUNTER — VIRTUAL VISIT (OUTPATIENT)
Dept: PSYCHOLOGY | Facility: CLINIC | Age: 49
End: 2024-08-22
Payer: COMMERCIAL

## 2024-08-22 DIAGNOSIS — F90.0 ADHD (ATTENTION DEFICIT HYPERACTIVITY DISORDER), INATTENTIVE TYPE: Primary | ICD-10-CM

## 2024-08-22 DIAGNOSIS — F41.1 GAD (GENERALIZED ANXIETY DISORDER): ICD-10-CM

## 2024-08-22 PROCEDURE — 90837 PSYTX W PT 60 MINUTES: CPT | Mod: 95

## 2024-08-22 ASSESSMENT — ANXIETY QUESTIONNAIRES
IF YOU CHECKED OFF ANY PROBLEMS ON THIS QUESTIONNAIRE, HOW DIFFICULT HAVE THESE PROBLEMS MADE IT FOR YOU TO DO YOUR WORK, TAKE CARE OF THINGS AT HOME, OR GET ALONG WITH OTHER PEOPLE: SOMEWHAT DIFFICULT
GAD7 TOTAL SCORE: 10
GAD7 TOTAL SCORE: 10
6. BECOMING EASILY ANNOYED OR IRRITABLE: NEARLY EVERY DAY
4. TROUBLE RELAXING: NEARLY EVERY DAY
5. BEING SO RESTLESS THAT IT IS HARD TO SIT STILL: SEVERAL DAYS
7. FEELING AFRAID AS IF SOMETHING AWFUL MIGHT HAPPEN: NOT AT ALL
3. WORRYING TOO MUCH ABOUT DIFFERENT THINGS: SEVERAL DAYS
2. NOT BEING ABLE TO STOP OR CONTROL WORRYING: SEVERAL DAYS
1. FEELING NERVOUS, ANXIOUS, OR ON EDGE: SEVERAL DAYS

## 2024-08-22 NOTE — PROGRESS NOTES
[facility]                                     Progress Note    Patient Name: Tressa Jeong  Date: 8/22/2024      Service Type: Individual      Session Start Time: 2:00 PM  Session End Time: 2:55 PM     Session Length: 53+ min    Session #: 26    Attendees: Client attended alone    Service Modality:  Video Visit:      Provider verified identity through the following two step process.  Patient provided:  Patient is known previously to provider    Telemedicine Visit: The patient's condition can be safely assessed and treated via synchronous audio and visual telemedicine encounter.      Reason for Telemedicine Visit: Patient has requested telehealth visit    Originating Site (Patient Location): Patient's home    Distant Site (Provider Location): Provider Remote Setting- Home Office    Consent:  The patient/guardian has verbally consented to: the potential risks and benefits of telemedicine (video visit) versus in person care; bill my insurance or make self-payment for services provided; and responsibility for payment of non-covered services.     Patient would like the video invitation sent by:  My Chart    Mode of Communication:  Video Conference via Amwell    Distant Location (Provider):  Off-site    As the provider I attest to compliance with applicable laws and regulations related to telemedicine.    DATA  Extended Session (53+ minutes): PROLONGED SERVICE IN THE OUTPATIENT SETTING REQUIRING DIRECT (FACE-TO-FACE) PATIENT CONTACT BEYOND THE USUAL SERVICE:    - Patient's presenting concerns require more intensive intervention than could be completed within the usual service  Interactive Complexity: No  Crisis: No        Progress Since Last Session (Related to Symptoms / Goals / Homework):   Symptoms: Improving symptoms of anxiety based on LYUBOV-7 scores and patient self-report.     Homework: Partially completed      Episode of Care Goals: Minimal progress - PREPARATION (Decided to change -  considering how); Intervened by negotiating a change plan and determining options / strategies for behavior change, identifying triggers, exploring social supports, and working towards setting a date to begin behavior change     Current / Ongoing Stressors and Concerns:   Patient reports she is concerned about ADHD symptoms. Patient reports she is concerned about motivation. Patient reports she is concerned about working. Patient reports she is concerned about task completion.  Patient reports she is concerned about prioritizing tasks. Patient reports her dad is home for the winter. Patient reports concerns about re-entering the workforce. Patient reports she is concerned about productivity. Patient reports concerns about organization. Patient reports she is job searching. Patient reports concerns about an internal conflict. Patient reports her dad went back to [place]. Patient's mom was hospitalized. Patient reports she is concerned about her sleep habits.      Treatment Objective(s) Addressed in This Session:   Patient will learn about the diagnosis and symptoms of ADHD.   Patient will learn about how ADHD can impact social interactions and interpersonal relationships.  Client will identify triggers of anxiety and process them in session.    Client will identify initial signs or symptoms of anxiety.   Patient will learn 3 skills to help increase motivation and organization.     Intervention:   Motivational Interviewing: supported patient in processing and exploring concerns about her sleep habits.   Validated patient's emotions. Supported patient in exploring how sleep habits impact her symptoms of anxiety. Supported patient in exploring how implementing her bedtime routine went. Validated patient's concerns. Discussed having a time to settle down and relax before bed. Discussed setting a timer to remind herself of this time every night. Discussed continuing to practice implementing her bedtime routine and being  mindful of how effective that is.     Assessments completed prior to visit:  The following assessments were completed by patient for this visit:  PHQ9:       11/15/2022    11:10 AM 7/11/2023     3:49 PM 10/27/2023    11:00 AM 12/8/2023    10:45 AM 1/19/2024    11:00 AM 4/4/2024    12:00 PM 6/20/2024    12:00 PM   PHQ-9 SCORE   PHQ-9 Total Score MyChart  4 (Minimal depression)  10 (Moderate depression)      PHQ-9 Total Score 15 4 15 10 9 11 13     GAD7:       1/19/2024    11:00 AM 3/1/2024    11:00 AM 4/4/2024    12:00 PM 5/2/2024     1:00 PM 6/20/2024    12:00 PM 7/25/2024    12:00 PM 8/22/2024     2:00 PM   LYUBOV-7 SCORE   Total Score 11 7 7 7 9 14 10     PROMIS 10-Global Health (only subscores and total score):       10/13/2023    12:54 AM 10/26/2023    10:36 PM 11/20/2023     2:17 PM 1/19/2024    11:39 AM 2/29/2024    12:43 PM 4/4/2024    12:44 PM 6/20/2024    12:33 PM   PROMIS-10 Scores Only   Global Mental Health Score 10 9 9 10 11 11 10   Global Physical Health Score 16 15 16 17 14 15 16   PROMIS TOTAL - SUBSCORES 26 24 25 27 25 26 26         ASSESSMENT: Current Emotional / Mental Status (status of significant symptoms):   Risk status (Self / Other harm or suicidal ideation)   Patient denies current fears or concerns for personal safety.   Patient denies current or recent suicidal ideation or behaviors.   Patient denies current or recent homicidal ideation or behaviors.   Patient denies current or recent self injurious behavior or ideation.   Patient denies other safety concerns.   Patient reports there has been no change in risk factors since their last session.     Patient reports there has been no change in protective factors since their last session.     Recommended that patient call 911 or go to the local ED should there be a change in any of these risk factors.     Appearance:   Appropriate    Eye Contact:   Good    Psychomotor Behavior: Normal    Attitude:   Cooperative     Orientation:   All   Speech    Rate / Production: Hyperverbal     Volume:  Normal    Mood:    Anxious    Affect:    Worrisome    Thought Content:  Rumination    Thought Form:  Logical    Insight:    Good      Medication Review:   No changes to current psychiatric medication(s)     Medication Compliance:   Yes     Changes in Health Issues:   None reported     Chemical Use Review:   Substance Use: Chemical use reviewed, no active concerns identified      Tobacco Use: No current tobacco use.      Diagnosis:  1. ADHD (attention deficit hyperactivity disorder), inattentive type    2. LYUBOV (generalized anxiety disorder)        Collateral Reports Completed:   Not Applicable    PLAN: (Patient Tasks / Therapist Tasks / Other)  Patient will implement and adhere to her sleep routine. Patient will set an alarm for midnight to help settle down for the night.  We will discuss next session.         Aline Jimenez, [facility] 8/22/2024    ___________________________________________________________                                              Individual Treatment Plan    Patient's Name: Tressa Jeong  YOB: 1975    Date of Creation: 7/28/2023  Date Treatment Plan Last Reviewed/Revised: 6/20/2024  DSM5 Diagnoses: Attention-Deficit/Hyperactivity Disorder  314.00 (F90.0) Predominantly inattentive presentation or 300.02 (F41.1) Generalized Anxiety Disorder  Psychosocial / Contextual Factors: Patient is currently unemployed. Patient is living with her dad part time. Patient is single.   PROMIS (reviewed every 90 days):The following assessments were completed by patient for this visit:  PROMIS 10-Global Health (only subscores and total score):       10/13/2023    12:54 AM 10/26/2023    10:36 PM 11/20/2023     2:17 PM 1/19/2024    11:39 AM 2/29/2024    12:43 PM 4/4/2024    12:44 PM 6/20/2024    12:33 PM   PROMIS-10 Scores Only   Global Mental Health Score 10 9 9 10 11 11 10   Global Physical Health Score 16 15 16 17 14 15 16    PROMIS TOTAL - SUBSCORES 26 24 25 27 25 26 26        Referral / Collaboration:  Referral to another professional/service is not indicated at this time.    Anticipated number of session for this episode of care:  50  Anticipation frequency of session: Every other week  Anticipated Duration of each session: 53 or more minutes  Treatment plan will be reviewed in 90 days or when goals have been changed. There has been demonstrated improvement in functioning while patient has been engaged in psychotherapy/psychological service- if withdrawn the patient would deteriorate and/or relapse.     MeasurableTreatment Goal(s) related to diagnosis / functional impairment(s)  Goal 1: Patient will work on stabilizing symptoms of ADHD.      Objective #A  Patient will learn about the diagnosis and symptoms of ADHD.   Status: Continued - Date(s): 6/20/2024    Intervention(s)  Therapist will teach about the diagnosis of ADHD.     Objective #B  Patient will practice setting goals and completing them.                         Status: Continued - Date(s): 6/20/2024     Intervention(s)  Therapist will teach SMART goals and how to break tasks down into smaller tasks.     Objective #C  Patient will learn 3 skills to help increase motivation and organization.   Status: Continued - Date(s): 6/20/2024    Intervention(s)  Therapist will assign homework of using coping skills to increase motivation and organizational skills.    Objective #D  Patient will learn about how ADHD can impact social interactions and interpersonal relationships.  Status:Continued - Date(s): 6/20/2024    Intervention(s)  Therapist will teach about how ADHD can impact social interactions and interpersonal relationships.     Objective #D  Patient will create and adhere to a routine.  Status:Continued - Date(s): 6/20/2024    Intervention(s)  Therapist will assign homework to create a routine and teach about how to maintain routine.         Goal 2: Client will work on  stabilizing anxiety symptoms as evidenced by LYUBOV-7 scores (current is 7) and Self report.  Goal is to reduce by three points.      I will know I've met my goal when I can better manage my anxiety .      Objective #A Client will identify triggers of anxiety and process them in session.    Status: Continued - Date(s): 6/20/2024    Intervention(s)  Therapist will teach emotional recognition/identification by assigning homework to journal with written exercises.    Objective #B  Client will identify initial signs or symptoms of anxiety.    Status: Continued - Date(s): 6/20/2024    Intervention(s)  Therapist will teach emotional regulation skills, such as deep breathing and mindfulness skills.    Objective #C  Client will learn about co-morbidities between ADHD and LYUBOV.   Status: Continued - Date(s): 6/20/2024    Intervention(s)  Therapist will provide educational materials on symptoms of ADHD and LYUBOV.          Patient has reviewed and agreed to the above plan.      TYE Marie  July 28, 2023  Reviewed 10/27/2023  Re-reviewed 1/19/2024 Re-reviewed 4/4/2024  Re-reviewed 6/20/2024

## 2024-08-26 NOTE — TELEPHONE ENCOUNTER
Patient Quality Outreach    Patient is due for the following:   Breast Cancer Screening - Mammogram    Next Steps:   Schedule a Adult Preventative    Type of outreach:    Phone, left message for patient/parent to call back.    Next Steps:  Reach out within 90 days via [employer].    Max number of attempts reached: Yes. Will try again in 90 days if patient still on fail list.    Questions for provider review:    None           Andrea Behrend

## 2024-09-05 ENCOUNTER — VIRTUAL VISIT (OUTPATIENT)
Dept: PSYCHOLOGY | Facility: CLINIC | Age: 49
End: 2024-09-05
Payer: COMMERCIAL

## 2024-09-05 DIAGNOSIS — F41.1 GAD (GENERALIZED ANXIETY DISORDER): ICD-10-CM

## 2024-09-05 DIAGNOSIS — F90.0 ADHD (ATTENTION DEFICIT HYPERACTIVITY DISORDER), INATTENTIVE TYPE: Primary | ICD-10-CM

## 2024-09-05 PROCEDURE — 90837 PSYTX W PT 60 MINUTES: CPT | Mod: 95

## 2024-09-05 NOTE — PROGRESS NOTES
[facility]                                     Progress Note    Patient Name: Tressa Jeong  Date: 9/5/2024      Service Type: Individual      Session Start Time: 2:00 PM  Session End Time: 2:55 PM     Session Length: 53+ min    Session #: 27    Attendees: Client attended alone    Service Modality:  Video Visit:      Provider verified identity through the following two step process.  Patient provided:  Patient is known previously to provider    Telemedicine Visit: The patient's condition can be safely assessed and treated via synchronous audio and visual telemedicine encounter.      Reason for Telemedicine Visit: Patient has requested telehealth visit    Originating Site (Patient Location): Patient's home    Distant Site (Provider Location): Provider Remote Setting- Home Office    Consent:  The patient/guardian has verbally consented to: the potential risks and benefits of telemedicine (video visit) versus in person care; bill my insurance or make self-payment for services provided; and responsibility for payment of non-covered services.     Patient would like the video invitation sent by:  My Chart    Mode of Communication:  Video Conference via Amwell    Distant Location (Provider):  Off-site    As the provider I attest to compliance with applicable laws and regulations related to telemedicine.    DATA  Extended Session (53+ minutes): PROLONGED SERVICE IN THE OUTPATIENT SETTING REQUIRING DIRECT (FACE-TO-FACE) PATIENT CONTACT BEYOND THE USUAL SERVICE:    - Patient's presenting concerns require more intensive intervention than could be completed within the usual service  Interactive Complexity: No  Crisis: No        Progress Since Last Session (Related to Symptoms / Goals / Homework):   Symptoms: No change based on patient self-report.     Homework: Partially completed      Episode of Care Goals: Minimal progress - PREPARATION (Decided to change - considering how); Intervened by negotiating a  change plan and determining options / strategies for behavior change, identifying triggers, exploring social supports, and working towards setting a date to begin behavior change     Current / Ongoing Stressors and Concerns:   Patient reports she is concerned about ADHD symptoms. Patient reports she is concerned about motivation. Patient reports she is concerned about working. Patient reports she is concerned about task completion.  Patient reports she is concerned about prioritizing tasks. Patient reports her dad is home for the winter. Patient reports concerns about re-entering the workforce. Patient reports she is concerned about productivity. Patient reports concerns about organization. Patient reports she is job searching. Patient reports concerns about an internal conflict. Patient reports her dad went back to [place]. Patient's mom was hospitalized. Patient reports she is concerned about her sleep habits.      Treatment Objective(s) Addressed in This Session:   Patient will learn about the diagnosis and symptoms of ADHD.   Patient will learn about how ADHD can impact social interactions and interpersonal relationships.  Client will identify triggers of anxiety and process them in session.    Client will identify initial signs or symptoms of anxiety.   Patient will learn 3 skills to help increase motivation and organization.     Intervention:   Motivational Interviewing: supported patient in processing and exploring her sleep habits.   Validated patient's emotions. Supported patient in processing and exploring what she has been trying to improve these habits. Reviewed a night time routine. Supported patient in exploring what prevents her from engaging in the night time routine. Discussed motivators and consequences as motivators. Supported patient in exploring what helps her keep some routine. Discussed trying to match her sleep routine with her dog's rhythm because he is consistent. Discussed moving sleep  times back by half hour or hour increments.  Discussed being mindful of how effective this is.     Assessments completed prior to visit:  The following assessments were completed by patient for this visit:  PHQ9:       11/15/2022    11:10 AM 7/11/2023     3:49 PM 10/27/2023    11:00 AM 12/8/2023    10:45 AM 1/19/2024    11:00 AM 4/4/2024    12:00 PM 6/20/2024    12:00 PM   PHQ-9 SCORE   PHQ-9 Total Score MyChart  4 (Minimal depression)  10 (Moderate depression)      PHQ-9 Total Score 15 4 15 10 9 11 13     GAD7:       1/19/2024    11:00 AM 3/1/2024    11:00 AM 4/4/2024    12:00 PM 5/2/2024     1:00 PM 6/20/2024    12:00 PM 7/25/2024    12:00 PM 8/22/2024     2:00 PM   LYUBOV-7 SCORE   Total Score 11 7 7 7 9 14 10     PROMIS 10-Global Health (only subscores and total score):       10/13/2023    12:54 AM 10/26/2023    10:36 PM 11/20/2023     2:17 PM 1/19/2024    11:39 AM 2/29/2024    12:43 PM 4/4/2024    12:44 PM 6/20/2024    12:33 PM   PROMIS-10 Scores Only   Global Mental Health Score 10 9 9 10 11 11 10   Global Physical Health Score 16 15 16 17 14 15 16   PROMIS TOTAL - SUBSCORES 26 24 25 27 25 26 26         ASSESSMENT: Current Emotional / Mental Status (status of significant symptoms):   Risk status (Self / Other harm or suicidal ideation)   Patient denies current fears or concerns for personal safety.   Patient denies current or recent suicidal ideation or behaviors.   Patient denies current or recent homicidal ideation or behaviors.   Patient denies current or recent self injurious behavior or ideation.   Patient denies other safety concerns.   Patient reports there has been no change in risk factors since their last session.     Patient reports there has been no change in protective factors since their last session.     Recommended that patient call 911 or go to the local ED should there be a change in any of these risk factors.     Appearance:   Appropriate    Eye Contact:   Good    Psychomotor Behavior: Normal     Attitude:   Cooperative    Orientation:   All   Speech    Rate / Production: Hyperverbal     Volume:  Normal    Mood:    Anxious    Affect:    Worrisome    Thought Content:  Rumination    Thought Form:  Logical    Insight:    Good      Medication Review:   No changes to current psychiatric medication(s)     Medication Compliance:   Yes     Changes in Health Issues:   None reported     Chemical Use Review:   Substance Use: Chemical use reviewed, no active concerns identified      Tobacco Use: No current tobacco use.      Diagnosis:  1. ADHD (attention deficit hyperactivity disorder), inattentive type    2. LYUBOV (generalized anxiety disorder)        Collateral Reports Completed:   Not Applicable    PLAN: (Patient Tasks / Therapist Tasks / Other)  Patient will practice using her dog's rhythm to get on her own rhythm aligned. Patient will be mindful of how effective this is.  We will discuss next session.       Aline Jimenez, [facility] 9/5/2024    ___________________________________________________________                                              Individual Treatment Plan    Patient's Name: Tressa Jeong  YOB: 1975    Date of Creation: 7/28/2023  Date Treatment Plan Last Reviewed/Revised: 6/20/2024  DSM5 Diagnoses: Attention-Deficit/Hyperactivity Disorder  314.00 (F90.0) Predominantly inattentive presentation or 300.02 (F41.1) Generalized Anxiety Disorder  Psychosocial / Contextual Factors: Patient is currently unemployed. Patient is living with her dad part time. Patient is single.   PROMIS (reviewed every 90 days):The following assessments were completed by patient for this visit:  PROMIS 10-Global Health (only subscores and total score):       10/13/2023    12:54 AM 10/26/2023    10:36 PM 11/20/2023     2:17 PM 1/19/2024    11:39 AM 2/29/2024    12:43 PM 4/4/2024    12:44 PM 6/20/2024    12:33 PM   PROMIS-10 Scores Only   Global Mental Health Score 10 9 9 10 11 11 10   Global Physical Health Score 16 15  16 17 14 15 16   PROMIS TOTAL - SUBSCORES 26 24 25 27 25 26 26        Referral / Collaboration:  Referral to another professional/service is not indicated at this time.    Anticipated number of session for this episode of care:  50  Anticipation frequency of session: Every other week  Anticipated Duration of each session: 53 or more minutes  Treatment plan will be reviewed in 90 days or when goals have been changed. There has been demonstrated improvement in functioning while patient has been engaged in psychotherapy/psychological service- if withdrawn the patient would deteriorate and/or relapse.     MeasurableTreatment Goal(s) related to diagnosis / functional impairment(s)  Goal 1: Patient will work on stabilizing symptoms of ADHD.      Objective #A  Patient will learn about the diagnosis and symptoms of ADHD.   Status: Continued - Date(s): 6/20/2024    Intervention(s)  Therapist will teach about the diagnosis of ADHD.     Objective #B  Patient will practice setting goals and completing them.                         Status: Continued - Date(s): 6/20/2024     Intervention(s)  Therapist will teach SMART goals and how to break tasks down into smaller tasks.     Objective #C  Patient will learn 3 skills to help increase motivation and organization.   Status: Continued - Date(s): 6/20/2024    Intervention(s)  Therapist will assign homework of using coping skills to increase motivation and organizational skills.    Objective #D  Patient will learn about how ADHD can impact social interactions and interpersonal relationships.  Status:Continued - Date(s): 6/20/2024    Intervention(s)  Therapist will teach about how ADHD can impact social interactions and interpersonal relationships.     Objective #D  Patient will create and adhere to a routine.  Status:Continued - Date(s): 6/20/2024    Intervention(s)  Therapist will assign homework to create a routine and teach about how to maintain routine.         Goal 2: Client will  work on stabilizing anxiety symptoms as evidenced by LYUBOV-7 scores (current is 7) and Self report.  Goal is to reduce by three points.      I will know I've met my goal when I can better manage my anxiety .      Objective #A Client will identify triggers of anxiety and process them in session.    Status: Continued - Date(s): 6/20/2024    Intervention(s)  Therapist will teach emotional recognition/identification by assigning homework to journal with written exercises.    Objective #B  Client will identify initial signs or symptoms of anxiety.    Status: Continued - Date(s): 6/20/2024    Intervention(s)  Therapist will teach emotional regulation skills, such as deep breathing and mindfulness skills.    Objective #C  Client will learn about co-morbidities between ADHD and LYUBOV.   Status: Continued - Date(s): 6/20/2024    Intervention(s)  Therapist will provide educational materials on symptoms of ADHD and LYUBOV.          Patient has reviewed and agreed to the above plan.      TYE Marie  July 28, 2023  Reviewed 10/27/2023  Re-reviewed 1/19/2024 Re-reviewed 4/4/2024  Re-reviewed 6/20/2024

## 2024-10-03 ENCOUNTER — VIRTUAL VISIT (OUTPATIENT)
Dept: PSYCHOLOGY | Facility: CLINIC | Age: 49
End: 2024-10-03
Payer: COMMERCIAL

## 2024-10-03 DIAGNOSIS — F41.1 GAD (GENERALIZED ANXIETY DISORDER): ICD-10-CM

## 2024-10-03 DIAGNOSIS — F90.0 ADHD (ATTENTION DEFICIT HYPERACTIVITY DISORDER), INATTENTIVE TYPE: Primary | ICD-10-CM

## 2024-10-03 PROCEDURE — 90837 PSYTX W PT 60 MINUTES: CPT | Mod: 95

## 2024-10-03 ASSESSMENT — COLUMBIA-SUICIDE SEVERITY RATING SCALE - C-SSRS
2. HAVE YOU ACTUALLY HAD ANY THOUGHTS OF KILLING YOURSELF?: NO
SUICIDE, SINCE LAST CONTACT: NO
ATTEMPT SINCE LAST CONTACT: NO
1. SINCE LAST CONTACT, HAVE YOU WISHED YOU WERE DEAD OR WISHED YOU COULD GO TO SLEEP AND NOT WAKE UP?: NO
TOTAL  NUMBER OF ABORTED OR SELF INTERRUPTED ATTEMPTS SINCE LAST CONTACT: NO
6. HAVE YOU EVER DONE ANYTHING, STARTED TO DO ANYTHING, OR PREPARED TO DO ANYTHING TO END YOUR LIFE?: NO
TOTAL  NUMBER OF INTERRUPTED ATTEMPTS SINCE LAST CONTACT: NO

## 2024-10-03 ASSESSMENT — ANXIETY QUESTIONNAIRES
3. WORRYING TOO MUCH ABOUT DIFFERENT THINGS: NEARLY EVERY DAY
5. BEING SO RESTLESS THAT IT IS HARD TO SIT STILL: NEARLY EVERY DAY
GAD7 TOTAL SCORE: 12
4. TROUBLE RELAXING: NEARLY EVERY DAY
2. NOT BEING ABLE TO STOP OR CONTROL WORRYING: NOT AT ALL
6. BECOMING EASILY ANNOYED OR IRRITABLE: MORE THAN HALF THE DAYS
1. FEELING NERVOUS, ANXIOUS, OR ON EDGE: SEVERAL DAYS
7. FEELING AFRAID AS IF SOMETHING AWFUL MIGHT HAPPEN: NOT AT ALL
IF YOU CHECKED OFF ANY PROBLEMS ON THIS QUESTIONNAIRE, HOW DIFFICULT HAVE THESE PROBLEMS MADE IT FOR YOU TO DO YOUR WORK, TAKE CARE OF THINGS AT HOME, OR GET ALONG WITH OTHER PEOPLE: SOMEWHAT DIFFICULT
GAD7 TOTAL SCORE: 12

## 2024-10-03 ASSESSMENT — PATIENT HEALTH QUESTIONNAIRE - PHQ9: SUM OF ALL RESPONSES TO PHQ QUESTIONS 1-9: 12

## 2024-10-03 NOTE — PROGRESS NOTES
[facility]                                     Progress Note    Patient Name: Tressa Jeong  Date: 10/3/2024      Service Type: Individual      Session Start Time: 2:01 PM  Session End Time: 2:54 PM     Session Length: 53+ min    Session #: 28    Attendees: Client attended alone    Service Modality:  Video Visit:      Provider verified identity through the following two step process.  Patient provided:  Patient is known previously to provider    Telemedicine Visit: The patient's condition can be safely assessed and treated via synchronous audio and visual telemedicine encounter.      Reason for Telemedicine Visit: Patient has requested telehealth visit    Originating Site (Patient Location): Patient's home    Distant Site (Provider Location): Provider Remote Setting- Home Office    Consent:  The patient/guardian has verbally consented to: the potential risks and benefits of telemedicine (video visit) versus in person care; bill my insurance or make self-payment for services provided; and responsibility for payment of non-covered services.     Patient would like the video invitation sent by:  My Chart    Mode of Communication:  Video Conference via Amwell    Distant Location (Provider):  Off-site    As the provider I attest to compliance with applicable laws and regulations related to telemedicine.    DATA  Extended Session (53+ minutes): PROLONGED SERVICE IN THE OUTPATIENT SETTING REQUIRING DIRECT (FACE-TO-FACE) PATIENT CONTACT BEYOND THE USUAL SERVICE:    - Patient's presenting concerns require more intensive intervention than could be completed within the usual service  Interactive Complexity: No  Crisis: No        Progress Since Last Session (Related to Symptoms / Goals / Homework):   Symptoms: No change in symptoms of depression and worsening symptoms of anxiety based on PHQ-9 and LYUBOV-7 scores and patient self-report.    Homework: Partially completed      Episode of Care Goals: Minimal  progress - PREPARATION (Decided to change - considering how); Intervened by negotiating a change plan and determining options / strategies for behavior change, identifying triggers, exploring social supports, and working towards setting a date to begin behavior change     Current / Ongoing Stressors and Concerns:   Patient reports she is concerned about ADHD symptoms. Patient reports she is concerned about motivation. Patient reports she is concerned about working. Patient reports she is concerned about task completion.  Patient reports she is concerned about prioritizing tasks. Patient reports her dad is home for the winter. Patient reports concerns about re-entering the workforce. Patient reports she is concerned about productivity. Patient reports concerns about organization. Patient reports she is job searching. Patient reports concerns about an internal conflict. Patient reports her dad went back to [place]. Patient's mom was hospitalized. Patient reports she is concerned about her sleep habits.      Treatment Objective(s) Addressed in This Session:   Patient will learn about the diagnosis and symptoms of ADHD.   Patient will learn about how ADHD can impact social interactions and interpersonal relationships.  Client will identify triggers of anxiety and process them in session.    Client will identify initial signs or symptoms of anxiety.   Patient will learn 3 skills to help increase motivation and organization.     Intervention:   Motivational Interviewing: supported patient in processing and exploring her sleep habits.   Validated patient's emotions. Supported patient in exploring coping skills she has been trying to implement to change her sleep patterns. Validated patient's use of skills. Supported patient in exploring how movement helps her fall asleep at night. Discussed getting more physical activity and movement throughout the day and being mindful of how this impacts her sleep. Discussed emotional  dysregulation as a symptoms of ADHD. Supported patient in exploring her emotional dysregulation with anger.    Reviewed treatment plan and goals with patient.     Assessments completed prior to visit:  The following assessments were completed by patient for this visit:  PHQ9:       7/11/2023     3:49 PM 10/27/2023    11:00 AM 12/8/2023    10:45 AM 1/19/2024    11:00 AM 4/4/2024    12:00 PM 6/20/2024    12:00 PM 10/3/2024     2:00 PM   PHQ-9 SCORE   PHQ-9 Total Score MyChart 4 (Minimal depression)  10 (Moderate depression)       PHQ-9 Total Score 4 15 10 9 11 13 12     GAD7:       3/1/2024    11:00 AM 4/4/2024    12:00 PM 5/2/2024     1:00 PM 6/20/2024    12:00 PM 7/25/2024    12:00 PM 8/22/2024     2:00 PM 10/3/2024     2:00 PM   LYUBOV-7 SCORE   Total Score 7 7 7 9 14 10 12     PROMIS 10-Global Health (only subscores and total score):       10/26/2023    10:36 PM 11/20/2023     2:17 PM 1/19/2024    11:39 AM 2/29/2024    12:43 PM 4/4/2024    12:44 PM 6/20/2024    12:33 PM 10/3/2024     1:38 PM   PROMIS-10 Scores Only   Global Mental Health Score 9 9 10 11 11 10 11   Global Physical Health Score 15 16 17 14 15 16 15   PROMIS TOTAL - SUBSCORES 24 25 27 25 26 26 26         ASSESSMENT: Current Emotional / Mental Status (status of significant symptoms):   Risk status (Self / Other harm or suicidal ideation)   Patient denies current fears or concerns for personal safety.   Patient denies current or recent suicidal ideation or behaviors.   Patient denies current or recent homicidal ideation or behaviors.   Patient denies current or recent self injurious behavior or ideation.   Patient denies other safety concerns.   Patient reports there has been no change in risk factors since their last session.     Patient reports there has been no change in protective factors since their last session.     Recommended that patient call 911 or go to the local ED should there be a change in any of these risk  factors.     Appearance:   Appropriate    Eye Contact:   Good    Psychomotor Behavior: Normal    Attitude:   Cooperative    Orientation:   All   Speech    Rate / Production: Hyperverbal     Volume:  Normal    Mood:    Anxious    Affect:    Worrisome    Thought Content:  Rumination    Thought Form:  Logical    Insight:    Good      Medication Review:   No changes to current psychiatric medication(s)     Medication Compliance:   Yes     Changes in Health Issues:   None reported     Chemical Use Review:   Substance Use: Chemical use reviewed, no active concerns identified      Tobacco Use: No current tobacco use.      Diagnosis:  1. ADHD (attention deficit hyperactivity disorder), inattentive type    2. LYUBOV (generalized anxiety disorder)        Collateral Reports Completed:   Not Applicable    PLAN: (Patient Tasks / Therapist Tasks / Other)  Patient will practice getting more movement during the day. Patient will be mindful of how this effects her sleep patterns.  We will discuss next session.       Aline Jimenez, [facility] HospitalSW 10/3/2024    ___________________________________________________________                                              Individual Treatment Plan    Patient's Name: Tressa Jeong  YOB: 1975    Date of Creation: 7/28/2023  Date Treatment Plan Last Reviewed/Revised: 6/20/2024  DSM5 Diagnoses: Attention-Deficit/Hyperactivity Disorder  314.00 (F90.0) Predominantly inattentive presentation or 300.02 (F41.1) Generalized Anxiety Disorder  Psychosocial / Contextual Factors: Patient is currently unemployed. Patient is living with her dad part time. Patient is single.   PROMIS (reviewed every 90 days):The following assessments were completed by patient for this visit:  PROMIS 10-Global Health (only subscores and total score):       10/26/2023    10:36 PM 11/20/2023     2:17 PM 1/19/2024    11:39 AM 2/29/2024    12:43 PM 4/4/2024    12:44 PM 6/20/2024    12:33 PM 10/3/2024     1:38 PM   PROMIS-10 Scores  Only   Global Mental Health Score 9 9 10 11 11 10 11   Global Physical Health Score 15 16 17 14 15 16 15   PROMIS TOTAL - SUBSCORES 24 25 27 25 26 26 26        Referral / Collaboration:  Referral to another professional/service is not indicated at this time.    Anticipated number of session for this episode of care:  50  Anticipation frequency of session: Every other week  Anticipated Duration of each session: 53 or more minutes  Treatment plan will be reviewed in 90 days or when goals have been changed. There has been demonstrated improvement in functioning while patient has been engaged in psychotherapy/psychological service- if withdrawn the patient would deteriorate and/or relapse.     MeasurableTreatment Goal(s) related to diagnosis / functional impairment(s)  Goal 1: Patient will work on stabilizing symptoms of ADHD.      Objective #A  Patient will learn about the diagnosis and symptoms of ADHD.   Status: Continued - Date(s): 10/3/2024    Intervention(s)  Therapist will teach about the diagnosis of ADHD.     Objective #B  Patient will practice setting goals and completing them.                         Status: Continued - Date(s): 10/3/2024     Intervention(s)  Therapist will teach SMART goals and how to break tasks down into smaller tasks.     Objective #C  Patient will learn 3 skills to help increase motivation and organization.   Status: Continued - Date(s): 10/3/2024    Intervention(s)  Therapist will assign homework of using coping skills to increase motivation and organizational skills.    Objective #D  Patient will learn about how ADHD can impact social interactions and interpersonal relationships.  Status:Continued - Date(s): 10/3/2024    Intervention(s)  Therapist will teach about how ADHD can impact social interactions and interpersonal relationships.     Objective #D  Patient will create and adhere to a routine.  Status:Continued - Date(s): 10/3/2024    Intervention(s)  Therapist will assign homework  to create a routine and teach about how to maintain routine.         Goal 2: Client will work on stabilizing anxiety symptoms as evidenced by LYUBOV-7 scores (current is 12) and Self report.  Goal is to reduce by three points.      I will know I've met my goal when I can better manage my anxiety .      Objective #A Client will identify triggers of anxiety and process them in session.    Status: Continued - Date(s): 10/3/2024    Intervention(s)  Therapist will teach emotional recognition/identification by assigning homework to journal with written exercises.    Objective #B  Client will identify initial signs or symptoms of anxiety.    Status: Continued - Date(s): 10/3/2024    Intervention(s)  Therapist will teach emotional regulation skills, such as deep breathing and mindfulness skills.    Objective #C  Client will learn about co-morbidities between ADHD and LYUBOV.   Status: Continued - Date(s): 10/3/2024    Intervention(s)  Therapist will provide educational materials on symptoms of ADHD and LYUBOV.          Patient has reviewed and agreed to the above plan.      TYE Marie  July 28, 2023  Reviewed 10/27/2023  Re-reviewed 1/19/2024 Re-reviewed 4/4/2024  Re-reviewed 6/20/2024 Re-reviewed 10/3/2024

## 2024-10-17 ENCOUNTER — VIRTUAL VISIT (OUTPATIENT)
Dept: PSYCHOLOGY | Facility: CLINIC | Age: 49
End: 2024-10-17
Payer: COMMERCIAL

## 2024-10-17 DIAGNOSIS — F90.0 ADHD (ATTENTION DEFICIT HYPERACTIVITY DISORDER), INATTENTIVE TYPE: Primary | ICD-10-CM

## 2024-10-17 DIAGNOSIS — F41.1 GAD (GENERALIZED ANXIETY DISORDER): ICD-10-CM

## 2024-10-17 PROCEDURE — 90837 PSYTX W PT 60 MINUTES: CPT | Mod: 95

## 2024-10-17 NOTE — PROGRESS NOTES
[facility]                                     Progress Note    Patient Name: Tressa Jeong  Date: 10/17/2024      Service Type: Individual      Session Start Time: 2:02 PM  Session End Time: 2:55 PM     Session Length: 53+ min    Session #: 29    Attendees: Client attended alone    Service Modality:  Video Visit:      Provider verified identity through the following two step process.  Patient provided:  Patient is known previously to provider    Telemedicine Visit: The patient's condition can be safely assessed and treated via synchronous audio and visual telemedicine encounter.      Reason for Telemedicine Visit: Patient has requested telehealth visit    Originating Site (Patient Location): Patient's home    Distant Site (Provider Location): Provider Remote Setting- Home Office    Consent:  The patient/guardian has verbally consented to: the potential risks and benefits of telemedicine (video visit) versus in person care; bill my insurance or make self-payment for services provided; and responsibility for payment of non-covered services.     Patient would like the video invitation sent by:  My Chart    Mode of Communication:  Video Conference via [employer]well    Distant Location (Provider):  Off-site    As the provider I attest to compliance with applicable laws and regulations related to telemedicine.    DATA  Extended Session (53+ minutes): PROLONGED SERVICE IN THE OUTPATIENT SETTING REQUIRING DIRECT (FACE-TO-FACE) PATIENT CONTACT BEYOND THE USUAL SERVICE:    - Patient's presenting concerns require more intensive intervention than could be completed within the usual service  Interactive Complexity: No  Crisis: No        Progress Since Last Session (Related to Symptoms / Goals / Homework):   Symptoms: No change based on patient self-report.     Homework: Partially completed      Episode of Care Goals: Minimal progress - PREPARATION (Decided to change - considering how); Intervened by negotiating  a change plan and determining options / strategies for behavior change, identifying triggers, exploring social supports, and working towards setting a date to begin behavior change     Current / Ongoing Stressors and Concerns:   Patient reports she is concerned about ADHD symptoms. Patient reports she is concerned about motivation. Patient reports she is concerned about working. Patient reports she is concerned about task completion.  Patient reports she is concerned about prioritizing tasks. Patient reports her dad is home for the winter. Patient reports concerns about re-entering the workforce. Patient reports she is concerned about productivity. Patient reports concerns about organization. Patient reports she is job searching. Patient reports concerns about an internal conflict. Patient reports her dad went back to [place]. Patient's mom was hospitalized. Patient reports she is concerned about her sleep habits. Patient reports concerns about burn out.      Treatment Objective(s) Addressed in This Session:   Patient will learn about the diagnosis and symptoms of ADHD.   Patient will learn about how ADHD can impact social interactions and interpersonal relationships.  Client will identify triggers of anxiety and process them in session.    Client will identify initial signs or symptoms of anxiety.   Patient will learn 3 skills to help increase motivation and organization.     Intervention:   Motivational Interviewing: supported patient in processing and exploring job searching.   Validated patient's emotions. Supported patient in processing and exploring burn out. Discussed how burn out with ADHD is different that neurotypical burn out. Sent patient an article about ADHD burn out via [employer]. Discussed patient reading this article and identifying her triggers of ADHD.      Assessments completed prior to visit:  The following assessments were completed by patient for this visit:  PHQ9:       7/11/2023     3:49 PM  10/27/2023    11:00 AM 12/8/2023    10:45 AM 1/19/2024    11:00 AM 4/4/2024    12:00 PM 6/20/2024    12:00 PM 10/3/2024     2:00 PM   PHQ-9 SCORE   PHQ-9 Total Score MyChart 4 (Minimal depression)  10 (Moderate depression)       PHQ-9 Total Score 4 15 10 9 11 13 12     GAD7:       3/1/2024    11:00 AM 4/4/2024    12:00 PM 5/2/2024     1:00 PM 6/20/2024    12:00 PM 7/25/2024    12:00 PM 8/22/2024     2:00 PM 10/3/2024     2:00 PM   LYUBOV-7 SCORE   Total Score 7 7 7 9 14 10 12     PROMIS 10-Global Health (only subscores and total score):       10/26/2023    10:36 PM 11/20/2023     2:17 PM 1/19/2024    11:39 AM 2/29/2024    12:43 PM 4/4/2024    12:44 PM 6/20/2024    12:33 PM 10/3/2024     1:38 PM   PROMIS-10 Scores Only   Global Mental Health Score 9 9 10 11 11 10 11   Global Physical Health Score 15 16 17 14 15 16 15   PROMIS TOTAL - SUBSCORES 24 25 27 25 26 26 26         ASSESSMENT: Current Emotional / Mental Status (status of significant symptoms):   Risk status (Self / Other harm or suicidal ideation)   Patient denies current fears or concerns for personal safety.   Patient denies current or recent suicidal ideation or behaviors.   Patient denies current or recent homicidal ideation or behaviors.   Patient denies current or recent self injurious behavior or ideation.   Patient denies other safety concerns.   Patient reports there has been no change in risk factors since their last session.     Patient reports there has been no change in protective factors since their last session.     Recommended that patient call 911 or go to the local ED should there be a change in any of these risk factors     Appearance:   Appropriate    Eye Contact:   Good    Psychomotor Behavior: Normal    Attitude:   Cooperative    Orientation:   All   Speech    Rate / Production: Hyperverbal     Volume:  Normal    Mood:    Anxious    Affect:    Worrisome    Thought Content:  Rumination    Thought Form:  Logical    Insight:    Good       Medication Review:   No changes to current psychiatric medication(s)     Medication Compliance:   Yes     Changes in Health Issues:   None reported     Chemical Use Review:   Substance Use: Chemical use reviewed, no active concerns identified      Tobacco Use: No current tobacco use.      Diagnosis:  1. ADHD (attention deficit hyperactivity disorder), inattentive type    2. LYUBOV (generalized anxiety disorder)        Collateral Reports Completed:   Not Applicable    PLAN: (Patient Tasks / Therapist Tasks / Other)  Patient will read ADHD burn out article. Patient will identify triggers of her burn out.  We will discuss next session.       Aline Jimenez, [facility] Hospital[facility] 10/17/2024    ___________________________________________________________                                              Individual Treatment Plan    Patient's Name: Tressa Jeong  YOB: 1975    Date of Creation: 7/28/2023  Date Treatment Plan Last Reviewed/Revised: 10/3/2024  DSM5 Diagnoses: Attention-Deficit/Hyperactivity Disorder  314.00 (F90.0) Predominantly inattentive presentation or 300.02 (F41.1) Generalized Anxiety Disorder  Psychosocial / Contextual Factors: Patient is currently unemployed. Patient is living with her dad part time. Patient is single.   PROMIS (reviewed every 90 days):The following assessments were completed by patient for this visit:  PROMIS 10-Global Health (only subscores and total score):       10/26/2023    10:36 PM 11/20/2023     2:17 PM 1/19/2024    11:39 AM 2/29/2024    12:43 PM 4/4/2024    12:44 PM 6/20/2024    12:33 PM 10/3/2024     1:38 PM   PROMIS-10 Scores Only   Global Mental Health Score 9 9 10 11 11 10 11   Global Physical Health Score 15 16 17 14 15 16 15   PROMIS TOTAL - SUBSCORES 24 25 27 25 26 26 26        Referral / Collaboration:  Referral to another professional/service is not indicated at this time.    Anticipated number of session for this episode of care:  50  Anticipation frequency of session:  Every other week  Anticipated Duration of each session: 53 or more minutes  Treatment plan will be reviewed in 90 days or when goals have been changed. There has been demonstrated improvement in functioning while patient has been engaged in psychotherapy/psychological service- if withdrawn the patient would deteriorate and/or relapse.     MeasurableTreatment Goal(s) related to diagnosis / functional impairment(s)  Goal 1: Patient will work on stabilizing symptoms of ADHD.      Objective #A  Patient will learn about the diagnosis and symptoms of ADHD.   Status: Continued - Date(s): 10/3/2024    Intervention(s)  Therapist will teach about the diagnosis of ADHD.     Objective #B  Patient will practice setting goals and completing them.                         Status: Continued - Date(s): 10/3/2024     Intervention(s)  Therapist will teach SMART goals and how to break tasks down into smaller tasks.     Objective #C  Patient will learn 3 skills to help increase motivation and organization.   Status: Continued - Date(s): 10/3/2024    Intervention(s)  Therapist will assign homework of using coping skills to increase motivation and organizational skills.    Objective #D  Patient will learn about how ADHD can impact social interactions and interpersonal relationships.  Status:Continued - Date(s): 10/3/2024    Intervention(s)  Therapist will teach about how ADHD can impact social interactions and interpersonal relationships.     Objective #D  Patient will create and adhere to a routine.  Status:Continued - Date(s): 10/3/2024    Intervention(s)  Therapist will assign homework to create a routine and teach about how to maintain routine.         Goal 2: Client will work on stabilizing anxiety symptoms as evidenced by LYUBOV-7 scores (current is 12) and Self report.  Goal is to reduce by three points.      I will know I've met my goal when I can better manage my anxiety .      Objective #A Client will identify triggers of anxiety  and process them in session.    Status: Continued - Date(s): 10/3/2024    Intervention(s)  Therapist will teach emotional recognition/identification by assigning homework to journal with written exercises.    Objective #B  Client will identify initial signs or symptoms of anxiety.    Status: Continued - Date(s): 10/3/2024    Intervention(s)  Therapist will teach emotional regulation skills, such as deep breathing and mindfulness skills.    Objective #C  Client will learn about co-morbidities between ADHD and LYUBOV.   Status: Continued - Date(s): 10/3/2024    Intervention(s)  Therapist will provide educational materials on symptoms of ADHD and LYUBOV.          Patient has reviewed and agreed to the above plan.      TYE Marie  July 28, 2023  Reviewed 10/27/2023  Re-reviewed 1/19/2024 Re-reviewed 4/4/2024  Re-reviewed 6/20/2024 Re-reviewed 10/3/2024

## 2024-10-31 ENCOUNTER — VIRTUAL VISIT (OUTPATIENT)
Dept: PSYCHOLOGY | Facility: CLINIC | Age: 49
End: 2024-10-31
Payer: COMMERCIAL

## 2024-10-31 DIAGNOSIS — F41.1 GAD (GENERALIZED ANXIETY DISORDER): ICD-10-CM

## 2024-10-31 DIAGNOSIS — F90.0 ADHD (ATTENTION DEFICIT HYPERACTIVITY DISORDER), INATTENTIVE TYPE: Primary | ICD-10-CM

## 2024-10-31 PROCEDURE — 90837 PSYTX W PT 60 MINUTES: CPT | Mod: 95

## 2024-10-31 ASSESSMENT — ANXIETY QUESTIONNAIRES
6. BECOMING EASILY ANNOYED OR IRRITABLE: MORE THAN HALF THE DAYS
2. NOT BEING ABLE TO STOP OR CONTROL WORRYING: SEVERAL DAYS
4. TROUBLE RELAXING: NEARLY EVERY DAY
3. WORRYING TOO MUCH ABOUT DIFFERENT THINGS: SEVERAL DAYS
IF YOU CHECKED OFF ANY PROBLEMS ON THIS QUESTIONNAIRE, HOW DIFFICULT HAVE THESE PROBLEMS MADE IT FOR YOU TO DO YOUR WORK, TAKE CARE OF THINGS AT HOME, OR GET ALONG WITH OTHER PEOPLE: SOMEWHAT DIFFICULT
7. FEELING AFRAID AS IF SOMETHING AWFUL MIGHT HAPPEN: NOT AT ALL
1. FEELING NERVOUS, ANXIOUS, OR ON EDGE: SEVERAL DAYS
GAD7 TOTAL SCORE: 10
5. BEING SO RESTLESS THAT IT IS HARD TO SIT STILL: MORE THAN HALF THE DAYS
GAD7 TOTAL SCORE: 10

## 2024-10-31 ASSESSMENT — PATIENT HEALTH QUESTIONNAIRE - PHQ9: SUM OF ALL RESPONSES TO PHQ QUESTIONS 1-9: 14

## 2024-10-31 NOTE — PROGRESS NOTES
[facility]                                     Progress Note    Patient Name: Tressa Jeong  Date: 10/31/2024      Service Type: Individual      Session Start Time: 2:00 PM  Session End Time: 2:54 PM     Session Length: 53+ min    Session #: 30    Attendees: Client attended alone    Service Modality:  Video Visit:      Provider verified identity through the following two step process.  Patient provided:  Patient is known previously to provider    Telemedicine Visit: The patient's condition can be safely assessed and treated via synchronous audio and visual telemedicine encounter.      Reason for Telemedicine Visit: Patient has requested telehealth visit    Originating Site (Patient Location): Patient's home    Distant Site (Provider Location): Provider Remote Setting- Home Office    Consent:  The patient/guardian has verbally consented to: the potential risks and benefits of telemedicine (video visit) versus in person care; bill my insurance or make self-payment for services provided; and responsibility for payment of non-covered services.     Patient would like the video invitation sent by:  My Chart    Mode of Communication:  Video Conference via [employer]well    Distant Location (Provider):  Off-site    As the provider I attest to compliance with applicable laws and regulations related to telemedicine.    DATA  Extended Session (53+ minutes): PROLONGED SERVICE IN THE OUTPATIENT SETTING REQUIRING DIRECT (FACE-TO-FACE) PATIENT CONTACT BEYOND THE USUAL SERVICE:    - Patient's presenting concerns require more intensive intervention than could be completed within the usual service  Interactive Complexity: No  Crisis: No        Progress Since Last Session (Related to Symptoms / Goals / Homework):   Symptoms: Worsening symptoms of depression and improving symptoms of anxiety based on PHQ-9 and LYUBOV-7 scores and patient self-report.    Homework: Partially completed      Episode of Care Goals: Minimal  progress - PREPARATION (Decided to change - considering how); Intervened by negotiating a change plan and determining options / strategies for behavior change, identifying triggers, exploring social supports, and working towards setting a date to begin behavior change     Current / Ongoing Stressors and Concerns:   Patient reports she is concerned about ADHD symptoms. Patient reports she is concerned about motivation. Patient reports she is concerned about working. Patient reports she is concerned about task completion.  Patient reports she is concerned about prioritizing tasks. Patient reports her dad is home for the winter. Patient reports concerns about re-entering the workforce. Patient reports she is concerned about productivity. Patient reports concerns about organization. Patient reports she is job searching. Patient reports concerns about an internal conflict. Patient reports her dad went back to [place]. Patient's mom was hospitalized. Patient reports she is concerned about her sleep habits. Patient reports concerns about burn out.      Treatment Objective(s) Addressed in This Session:   Patient will learn about the diagnosis and symptoms of ADHD.   Patient will learn about how ADHD can impact social interactions and interpersonal relationships.  Client will identify triggers of anxiety and process them in session.    Client will identify initial signs or symptoms of anxiety.   Patient will learn 3 skills to help increase motivation and organization.     Intervention:   Motivational Interviewing: supported patient in processing and exploring motivation.   Validated patient's emotions. Supported patient in processing and exploring what has been motivating in the past. Supported patient in exploring whether consequences or rewards are more motivating. Discussed identifying consequences that are applicable now and may increase motivation. Supported patient in exploring concerns about screens impacting  productivity. Discussed parental control locks to reduce access to screens. Discussed identifying ways that will work for her to reduce screen time. Supported patient in exploring an increase in symptoms of depression. Supported patient in exploring whether she is experiencing a depressive episode.      Assessments completed prior to visit:  The following assessments were completed by patient for this visit:  PHQ9:       10/27/2023    11:00 AM 12/8/2023    10:45 AM 1/19/2024    11:00 AM 4/4/2024    12:00 PM 6/20/2024    12:00 PM 10/3/2024     2:00 PM 10/31/2024     2:00 PM   PHQ-9 SCORE   PHQ-9 Total Score MyChart  10 (Moderate depression)        PHQ-9 Total Score 15 10 9 11 13 12 14     GAD7:       4/4/2024    12:00 PM 5/2/2024     1:00 PM 6/20/2024    12:00 PM 7/25/2024    12:00 PM 8/22/2024     2:00 PM 10/3/2024     2:00 PM 10/31/2024     2:00 PM   LYUBOV-7 SCORE   Total Score 7 7 9 14 10 12 10     PROMIS 10-Global Health (only subscores and total score):       10/26/2023    10:36 PM 11/20/2023     2:17 PM 1/19/2024    11:39 AM 2/29/2024    12:43 PM 4/4/2024    12:44 PM 6/20/2024    12:33 PM 10/3/2024     1:38 PM   PROMIS-10 Scores Only   Global Mental Health Score 9 9 10 11 11 10 11   Global Physical Health Score 15 16 17 14 15 16 15   PROMIS TOTAL - SUBSCORES 24 25 27 25 26 26 26         ASSESSMENT: Current Emotional / Mental Status (status of significant symptoms):   Risk status (Self / Other harm or suicidal ideation)   Patient denies current fears or concerns for personal safety.   Patient denies current or recent suicidal ideation or behaviors.   Patient denies current or recent homicidal ideation or behaviors.   Patient denies current or recent self injurious behavior or ideation.   Patient denies other safety concerns.   Patient reports there has been no change in risk factors since their last session.     Patient reports there has been no change in protective factors since their last session.      Recommended that patient call 911 or go to the local ED should there be a change in any of these risk factors     Appearance:   Appropriate    Eye Contact:   Good    Psychomotor Behavior: Normal    Attitude:   Cooperative    Orientation:   All   Speech    Rate / Production: Hyperverbal     Volume:  Normal    Mood:    Anxious    Affect:    Worrisome    Thought Content:  Rumination    Thought Form:  Logical    Insight:    Good      Medication Review:   No changes to current psychiatric medication(s)     Medication Compliance:   Yes     Changes in Health Issues:   None reported     Chemical Use Review:   Substance Use: Chemical use reviewed, no active concerns identified      Tobacco Use: No current tobacco use.      Diagnosis:  1. ADHD (attention deficit hyperactivity disorder), inattentive type    2. LYUBOV (generalized anxiety disorder)        Collateral Reports Completed:   Not Applicable    PLAN: (Patient Tasks / Therapist Tasks / Other)  Patient will identify ways to reduce screen time. Patient will identify what consequences to increase motivation.  We will discuss next session.       Aline Jimenez, [facility] 10/31/2024    ___________________________________________________________                                              Individual Treatment Plan    Patient's Name: Tressa Jeong  YOB: 1975    Date of Creation: 7/28/2023  Date Treatment Plan Last Reviewed/Revised: 10/3/2024  DSM5 Diagnoses: Attention-Deficit/Hyperactivity Disorder  314.00 (F90.0) Predominantly inattentive presentation or 300.02 (F41.1) Generalized Anxiety Disorder  Psychosocial / Contextual Factors: Patient is currently unemployed. Patient is living with her dad part time. Patient is single.   PROMIS (reviewed every 90 days):The following assessments were completed by patient for this visit:  PROMIS 10-Global Health (only subscores and total score):       10/26/2023    10:36 PM 11/20/2023     2:17 PM 1/19/2024    11:39 AM 2/29/2024     12:43 PM 4/4/2024    12:44 PM 6/20/2024    12:33 PM 10/3/2024     1:38 PM   PROMIS-10 Scores Only   Global Mental Health Score 9 9 10 11 11 10 11   Global Physical Health Score 15 16 17 14 15 16 15   PROMIS TOTAL - SUBSCORES 24 25 27 25 26 26 26        Referral / Collaboration:  Referral to another professional/service is not indicated at this time.    Anticipated number of session for this episode of care:  50  Anticipation frequency of session: Every other week  Anticipated Duration of each session: 53 or more minutes  Treatment plan will be reviewed in 90 days or when goals have been changed. There has been demonstrated improvement in functioning while patient has been engaged in psychotherapy/psychological service- if withdrawn the patient would deteriorate and/or relapse.     MeasurableTreatment Goal(s) related to diagnosis / functional impairment(s)  Goal 1: Patient will work on stabilizing symptoms of ADHD.      Objective #A  Patient will learn about the diagnosis and symptoms of ADHD.   Status: Continued - Date(s): 10/3/2024    Intervention(s)  Therapist will teach about the diagnosis of ADHD.     Objective #B  Patient will practice setting goals and completing them.                         Status: Continued - Date(s): 10/3/2024     Intervention(s)  Therapist will teach SMART goals and how to break tasks down into smaller tasks.     Objective #C  Patient will learn 3 skills to help increase motivation and organization.   Status: Continued - Date(s): 10/3/2024    Intervention(s)  Therapist will assign homework of using coping skills to increase motivation and organizational skills.    Objective #D  Patient will learn about how ADHD can impact social interactions and interpersonal relationships.  Status:Continued - Date(s): 10/3/2024    Intervention(s)  Therapist will teach about how ADHD can impact social interactions and interpersonal relationships.     Objective #D  Patient will create and adhere to a  routine.  Status:Continued - Date(s): 10/3/2024    Intervention(s)  Therapist will assign homework to create a routine and teach about how to maintain routine.         Goal 2: Client will work on stabilizing anxiety symptoms as evidenced by LYUBOV-7 scores (current is 12) and Self report.  Goal is to reduce by three points.      I will know I've met my goal when I can better manage my anxiety .      Objective #A Client will identify triggers of anxiety and process them in session.    Status: Continued - Date(s): 10/3/2024    Intervention(s)  Therapist will teach emotional recognition/identification by assigning homework to journal with written exercises.    Objective #B  Client will identify initial signs or symptoms of anxiety.    Status: Continued - Date(s): 10/3/2024    Intervention(s)  Therapist will teach emotional regulation skills, such as deep breathing and mindfulness skills.    Objective #C  Client will learn about co-morbidities between ADHD and LYUBOV.   Status: Continued - Date(s): 10/3/2024    Intervention(s)  Therapist will provide educational materials on symptoms of ADHD and LYUBOV.          Patient has reviewed and agreed to the above plan.      TYE Marie  July 28, 2023  Reviewed 10/27/2023  Re-reviewed 1/19/2024 Re-reviewed 4/4/2024  Re-reviewed 6/20/2024 Re-reviewed 10/3/2024

## 2024-11-14 ENCOUNTER — VIRTUAL VISIT (OUTPATIENT)
Dept: PSYCHOLOGY | Facility: CLINIC | Age: 49
End: 2024-11-14
Payer: COMMERCIAL

## 2024-11-14 DIAGNOSIS — F41.1 GAD (GENERALIZED ANXIETY DISORDER): ICD-10-CM

## 2024-11-14 DIAGNOSIS — F90.0 ADHD (ATTENTION DEFICIT HYPERACTIVITY DISORDER), INATTENTIVE TYPE: Primary | ICD-10-CM

## 2024-11-14 PROCEDURE — 90837 PSYTX W PT 60 MINUTES: CPT | Mod: 95

## 2024-11-14 NOTE — PROGRESS NOTES
[facility]                                     Progress Note    Patient Name: Tressa Jeong  Date: 11/14/2024      Service Type: Individual      Session Start Time: 12:00 PM  Session End Time: 12:54 PM     Session Length: 53+ min    Session #: 31    Attendees: Client attended alone    Service Modality:  Video Visit:      Provider verified identity through the following two step process.  Patient provided:  Patient is known previously to provider    Telemedicine Visit: The patient's condition can be safely assessed and treated via synchronous audio and visual telemedicine encounter.      Reason for Telemedicine Visit: Patient has requested telehealth visit    Originating Site (Patient Location): Patient's home    Distant Site (Provider Location): Provider Remote Setting- Home Office    Consent:  The patient/guardian has verbally consented to: the potential risks and benefits of telemedicine (video visit) versus in person care; bill my insurance or make self-payment for services provided; and responsibility for payment of non-covered services.     Patient would like the video invitation sent by:  My Chart    Mode of Communication:  Video Conference via Amwell    Distant Location (Provider):  Off-site    As the provider I attest to compliance with applicable laws and regulations related to telemedicine.    DATA  Extended Session (53+ minutes): PROLONGED SERVICE IN THE OUTPATIENT SETTING REQUIRING DIRECT (FACE-TO-FACE) PATIENT CONTACT BEYOND THE USUAL SERVICE:    - Patient's presenting concerns require more intensive intervention than could be completed within the usual service  Interactive Complexity: No  Crisis: No        Progress Since Last Session (Related to Symptoms / Goals / Homework):   Symptoms: No change based on patient self-report.     Homework: Partially completed      Episode of Care Goals: Minimal progress - PREPARATION (Decided to change - considering how); Intervened by  negotiating a change plan and determining options / strategies for behavior change, identifying triggers, exploring social supports, and working towards setting a date to begin behavior change     Current / Ongoing Stressors and Concerns:   Patient reports she is concerned about ADHD symptoms. Patient reports she is concerned about motivation. Patient reports she is concerned about working. Patient reports she is concerned about task completion. Patient reports she is concerned about prioritizing tasks. Patient reports her dad is home for the winter. Patient reports concerns about re-entering the workforce. Patient reports she is concerned about productivity. Patient reports concerns about organization. Patient reports she is job searching. Patient reports concerns about an internal conflict. Patient reports her dad went back to [place]. Patient's mom was hospitalized. Patient reports she is concerned about her sleep habits. Patient reports concerns about burn out. Patient reports concerns about the election results.      Treatment Objective(s) Addressed in This Session:   Patient will learn about the diagnosis and symptoms of ADHD.   Patient will learn about how ADHD can impact social interactions and interpersonal relationships.  Client will identify triggers of anxiety and process them in session.    Client will identify initial signs or symptoms of anxiety.   Patient will learn 3 skills to help increase motivation and organization.     Intervention:   Motivational Interviewing: supported patient in processing and exploring motivation.   Validated patient's emotions. Supported patient processing and exploring how lack of structure in her childhood may impact ability to structure her time as an adult. Supported patient in exploring how this may impact motivation. Reviewed how dopamine impacts ADHD. Discussed identifying and implementing a way to reduce screen time. Supported patient in processing and exploring  concerns about the election. Validated patient's concerns. Discussed how anxiety is looking for control. Discussed shifting her focus towards what she can control and away from what she cannot. Discussed being mindful of how effective this is.      Assessments completed prior to visit:  The following assessments were completed by patient for this visit:  PHQ9:       10/27/2023    11:00 AM 12/8/2023    10:45 AM 1/19/2024    11:00 AM 4/4/2024    12:00 PM 6/20/2024    12:00 PM 10/3/2024     2:00 PM 10/31/2024     2:00 PM   PHQ-9 SCORE   PHQ-9 Total Score MyChart  10 (Moderate depression)        PHQ-9 Total Score 15 10 9 11 13 12 14     GAD7:       4/4/2024    12:00 PM 5/2/2024     1:00 PM 6/20/2024    12:00 PM 7/25/2024    12:00 PM 8/22/2024     2:00 PM 10/3/2024     2:00 PM 10/31/2024     2:00 PM   LYUBOV-7 SCORE   Total Score 7 7 9 14 10 12 10     PROMIS 10-Global Health (only subscores and total score):       10/26/2023    10:36 PM 11/20/2023     2:17 PM 1/19/2024    11:39 AM 2/29/2024    12:43 PM 4/4/2024    12:44 PM 6/20/2024    12:33 PM 10/3/2024     1:38 PM   PROMIS-10 Scores Only   Global Mental Health Score 9 9 10 11 11 10 11   Global Physical Health Score 15 16 17 14 15 16 15   PROMIS TOTAL - SUBSCORES 24 25 27 25 26 26 26         ASSESSMENT: Current Emotional / Mental Status (status of significant symptoms):   Risk status (Self / Other harm or suicidal ideation)   Patient denies current fears or concerns for personal safety.   Patient denies current or recent suicidal ideation or behaviors.   Patient denies current or recent homicidal ideation or behaviors.   Patient denies current or recent self injurious behavior or ideation.   Patient denies other safety concerns.   Patient reports there has been no change in risk factors since their last session.     Patient reports there has been no change in protective factors since their last session.     Recommended that patient call 911 or go to the local ED should  there be a change in any of these risk factors     Appearance:   Appropriate    Eye Contact:   Good    Psychomotor Behavior: Normal    Attitude:   Cooperative    Orientation:   All   Speech    Rate / Production: Hyperverbal     Volume:  Normal    Mood:    Anxious    Affect:    Worrisome    Thought Content:  Rumination    Thought Form:  Logical    Insight:    Good      Medication Review:   No changes to current psychiatric medication(s)     Medication Compliance:   Yes     Changes in Health Issues:   None reported     Chemical Use Review:   Substance Use: Chemical use reviewed, no active concerns identified      Tobacco Use: No current tobacco use.      Diagnosis:  1. ADHD (attention deficit hyperactivity disorder), inattentive type    2. LYUBOV (generalized anxiety disorder)        Collateral Reports Completed:   Not Applicable    PLAN: (Patient Tasks / Therapist Tasks / Other)  Patient will identify and implement a way to reduce screen time. Patient will practice focusing towards what she can control and away from what she cannot. We will discuss next session.       Aline Jimenez, [facility] 11/14/2024    ___________________________________________________________                                              Individual Treatment Plan    Patient's Name: Tressa Jeong  YOB: 1975    Date of Creation: 7/28/2023  Date Treatment Plan Last Reviewed/Revised: 10/3/2024  DSM5 Diagnoses: Attention-Deficit/Hyperactivity Disorder  314.00 (F90.0) Predominantly inattentive presentation or 300.02 (F41.1) Generalized Anxiety Disorder  Psychosocial / Contextual Factors: Patient is currently unemployed. Patient is living with her dad part time. Patient is single.   PROMIS (reviewed every 90 days):The following assessments were completed by patient for this visit:  PROMIS 10-Global Health (only subscores and total score):       10/26/2023    10:36 PM 11/20/2023     2:17 PM 1/19/2024    11:39 AM 2/29/2024    12:43 PM 4/4/2024     12:44 PM 6/20/2024    12:33 PM 10/3/2024     1:38 PM   PROMIS-10 Scores Only   Global Mental Health Score 9 9 10 11 11 10 11   Global Physical Health Score 15 16 17 14 15 16 15   PROMIS TOTAL - SUBSCORES 24 25 27 25 26 26 26        Referral / Collaboration:  Referral to another professional/service is not indicated at this time.    Anticipated number of session for this episode of care:  50  Anticipation frequency of session: Every other week  Anticipated Duration of each session: 53 or more minutes  Treatment plan will be reviewed in 90 days or when goals have been changed. There has been demonstrated improvement in functioning while patient has been engaged in psychotherapy/psychological service- if withdrawn the patient would deteriorate and/or relapse.     MeasurableTreatment Goal(s) related to diagnosis / functional impairment(s)  Goal 1: Patient will work on stabilizing symptoms of ADHD.      Objective #A  Patient will learn about the diagnosis and symptoms of ADHD.   Status: Continued - Date(s): 10/3/2024    Intervention(s)  Therapist will teach about the diagnosis of ADHD.     Objective #B  Patient will practice setting goals and completing them.                         Status: Continued - Date(s): 10/3/2024     Intervention(s)  Therapist will teach SMART goals and how to break tasks down into smaller tasks.     Objective #C  Patient will learn 3 skills to help increase motivation and organization.   Status: Continued - Date(s): 10/3/2024    Intervention(s)  Therapist will assign homework of using coping skills to increase motivation and organizational skills.    Objective #D  Patient will learn about how ADHD can impact social interactions and interpersonal relationships.  Status:Continued - Date(s): 10/3/2024    Intervention(s)  Therapist will teach about how ADHD can impact social interactions and interpersonal relationships.     Objective #D  Patient will create and adhere to a routine.  Status:Continued  - Date(s): 10/3/2024    Intervention(s)  Therapist will assign homework to create a routine and teach about how to maintain routine.         Goal 2: Client will work on stabilizing anxiety symptoms as evidenced by LYUBOV-7 scores (current is 12) and Self report.  Goal is to reduce by three points.      I will know I've met my goal when I can better manage my anxiety .      Objective #A Client will identify triggers of anxiety and process them in session.    Status: Continued - Date(s): 10/3/2024    Intervention(s)  Therapist will teach emotional recognition/identification by assigning homework to journal with written exercises.    Objective #B  Client will identify initial signs or symptoms of anxiety.    Status: Continued - Date(s): 10/3/2024    Intervention(s)  Therapist will teach emotional regulation skills, such as deep breathing and mindfulness skills.    Objective #C  Client will learn about co-morbidities between ADHD and LYUBOV.   Status: Continued - Date(s): 10/3/2024    Intervention(s)  Therapist will provide educational materials on symptoms of ADHD and LYUBOV.          Patient has reviewed and agreed to the above plan.      TYE Marie  July 28, 2023  Reviewed 10/27/2023  Re-reviewed 1/19/2024 Re-reviewed 4/4/2024  Re-reviewed 6/20/2024 Re-reviewed 10/3/2024

## 2024-11-27 ENCOUNTER — VIRTUAL VISIT (OUTPATIENT)
Dept: PSYCHOLOGY | Facility: CLINIC | Age: 49
End: 2024-11-27
Payer: COMMERCIAL

## 2024-11-27 DIAGNOSIS — F41.1 GAD (GENERALIZED ANXIETY DISORDER): ICD-10-CM

## 2024-11-27 DIAGNOSIS — F90.0 ADHD (ATTENTION DEFICIT HYPERACTIVITY DISORDER), INATTENTIVE TYPE: Primary | ICD-10-CM

## 2024-11-27 PROCEDURE — 90837 PSYTX W PT 60 MINUTES: CPT | Mod: 95

## 2024-11-27 ASSESSMENT — ANXIETY QUESTIONNAIRES
5. BEING SO RESTLESS THAT IT IS HARD TO SIT STILL: SEVERAL DAYS
GAD7 TOTAL SCORE: 9
1. FEELING NERVOUS, ANXIOUS, OR ON EDGE: SEVERAL DAYS
GAD7 TOTAL SCORE: 9
3. WORRYING TOO MUCH ABOUT DIFFERENT THINGS: SEVERAL DAYS
7. FEELING AFRAID AS IF SOMETHING AWFUL MIGHT HAPPEN: NOT AT ALL
IF YOU CHECKED OFF ANY PROBLEMS ON THIS QUESTIONNAIRE, HOW DIFFICULT HAVE THESE PROBLEMS MADE IT FOR YOU TO DO YOUR WORK, TAKE CARE OF THINGS AT HOME, OR GET ALONG WITH OTHER PEOPLE: SOMEWHAT DIFFICULT
2. NOT BEING ABLE TO STOP OR CONTROL WORRYING: SEVERAL DAYS
6. BECOMING EASILY ANNOYED OR IRRITABLE: MORE THAN HALF THE DAYS
4. TROUBLE RELAXING: NEARLY EVERY DAY

## 2024-11-27 NOTE — PROGRESS NOTES
[facility]                                     Progress Note    Patient Name: Tressa Jeong  Date: 11/27/2024      Service Type: Individual      Session Start Time: 12:59 PM  Session End Time: 1:54 PM     Session Length: 53+ min    Session #: 32    Attendees: Client attended alone    Service Modality:  Video Visit:      Provider verified identity through the following two step process.  Patient provided:  Patient is known previously to provider    Telemedicine Visit: The patient's condition can be safely assessed and treated via synchronous audio and visual telemedicine encounter.      Reason for Telemedicine Visit: Patient has requested telehealth visit    Originating Site (Patient Location): Patient's home    Distant Site (Provider Location): Provider Remote Setting- Home Office    Consent:  The patient/guardian has verbally consented to: the potential risks and benefits of telemedicine (video visit) versus in person care; bill my insurance or make self-payment for services provided; and responsibility for payment of non-covered services.     Patient would like the video invitation sent by:  My Chart    Mode of Communication:  Video Conference via Amwell    Distant Location (Provider):  Off-site    As the provider I attest to compliance with applicable laws and regulations related to telemedicine.    DATA  Extended Session (53+ minutes): PROLONGED SERVICE IN THE OUTPATIENT SETTING REQUIRING DIRECT (FACE-TO-FACE) PATIENT CONTACT BEYOND THE USUAL SERVICE:    - Patient's presenting concerns require more intensive intervention than could be completed within the usual service  Interactive Complexity: No  Crisis: No        Progress Since Last Session (Related to Symptoms / Goals / Homework):   Symptoms: No change in symptoms of anxiety based on LYUBOV-7 scores and patient self-report.    Homework: Partially completed      Episode of Care Goals: Minimal progress - PREPARATION (Decided to change -  considering how); Intervened by negotiating a change plan and determining options / strategies for behavior change, identifying triggers, exploring social supports, and working towards setting a date to begin behavior change     Current / Ongoing Stressors and Concerns:   Patient reports she is concerned about ADHD symptoms. Patient reports she is concerned about motivation. Patient reports she is concerned about working. Patient reports she is concerned about task completion. Patient reports she is concerned about prioritizing tasks. Patient reports her dad is home for the winter. Patient reports concerns about re-entering the workforce. Patient reports she is concerned about productivity. Patient reports concerns about organization. Patient reports she is job searching. Patient reports concerns about an internal conflict. Patient reports her dad went back to [place]. Patient's mom was hospitalized. Patient reports she is concerned about her sleep habits. Patient reports concerns about burn out. Patient reports concerns about the election results.      Treatment Objective(s) Addressed in This Session:   Patient will learn about the diagnosis and symptoms of ADHD.   Patient will learn about how ADHD can impact social interactions and interpersonal relationships.  Client will identify triggers of anxiety and process them in session.    Client will identify initial signs or symptoms of anxiety.   Patient will practice setting goals and completing them.           Patient will learn 3 skills to help increase motivation and organization.  Patient will create and adhere to a routine.     Intervention:   Motivational Interviewing: supported patient in processing and exploring motivation.   Validated patient's emotions. Supported patient in exploring how consequences are more motivating than rewards. Supported patient in exploring how her mom coming to visit was motivation to clean the house. Supported patient in exploring  difficult breaking away from screens once she has started. Discussed trying to reduce screen time and limit it to the evenings. Supported patient in processing and exploring her daily routine. Discussed practicing incorporating movement into her morning routine and being mindful of how effective this is.      Assessments completed prior to visit:  The following assessments were completed by patient for this visit:  PHQ9:       10/27/2023    11:00 AM 12/8/2023    10:45 AM 1/19/2024    11:00 AM 4/4/2024    12:00 PM 6/20/2024    12:00 PM 10/3/2024     2:00 PM 10/31/2024     2:00 PM   PHQ-9 SCORE   PHQ-9 Total Score MyChart  10 (Moderate depression)        PHQ-9 Total Score 15 10 9 11 13 12 14     GAD7:       5/2/2024     1:00 PM 6/20/2024    12:00 PM 7/25/2024    12:00 PM 8/22/2024     2:00 PM 10/3/2024     2:00 PM 10/31/2024     2:00 PM 11/27/2024     1:00 PM   LYUBOV-7 SCORE   Total Score 7 9 14 10 12 10 9     PROMIS 10-Global Health (only subscores and total score):       10/26/2023    10:36 PM 11/20/2023     2:17 PM 1/19/2024    11:39 AM 2/29/2024    12:43 PM 4/4/2024    12:44 PM 6/20/2024    12:33 PM 10/3/2024     1:38 PM   PROMIS-10 Scores Only   Global Mental Health Score 9 9 10 11 11 10 11   Global Physical Health Score 15 16 17 14 15 16 15   PROMIS TOTAL - SUBSCORES 24 25 27 25 26 26 26         ASSESSMENT: Current Emotional / Mental Status (status of significant symptoms):   Risk status (Self / Other harm or suicidal ideation)   Patient denies current fears or concerns for personal safety.   Patient denies current or recent suicidal ideation or behaviors.   Patient denies current or recent homicidal ideation or behaviors.   Patient denies current or recent self injurious behavior or ideation.   Patient denies other safety concerns.   Patient reports there has been no change in risk factors since their last session.     Patient reports there has been no change in protective factors since their last session.      Recommended that patient call 911 or go to the local ED should there be a change in any of these risk factors     Appearance:   Appropriate    Eye Contact:   Good    Psychomotor Behavior: Normal    Attitude:   Cooperative    Orientation:   All   Speech    Rate / Production: Hyperverbal     Volume:  Normal    Mood:    Anxious    Affect:    Worrisome    Thought Content:  Rumination    Thought Form:  Logical    Insight:    Good      Medication Review:   No changes to current psychiatric medication(s)     Medication Compliance:   Yes     Changes in Health Issues:   None reported     Chemical Use Review:   Substance Use: Chemical use reviewed, no active concerns identified      Tobacco Use: No current tobacco use.      Diagnosis:  1. ADHD (attention deficit hyperactivity disorder), inattentive type    2. LYUBOV (generalized anxiety disorder)        Collateral Reports Completed:   Not Applicable    PLAN: (Patient Tasks / Therapist Tasks / Other)  Patient will continue to practice reducing screen time. Patient will practice engaging in movement as part of her morning routine. We will discuss next session.       Aline Jimenez, [facility] 11/27/2024    ___________________________________________________________                                              Individual Treatment Plan    Patient's Name: Tressa Jeong  YOB: 1975    Date of Creation: 7/28/2023  Date Treatment Plan Last Reviewed/Revised: 10/3/2024  DSM5 Diagnoses: Attention-Deficit/Hyperactivity Disorder  314.00 (F90.0) Predominantly inattentive presentation or 300.02 (F41.1) Generalized Anxiety Disorder  Psychosocial / Contextual Factors: Patient is currently unemployed. Patient is living with her dad part time. Patient is single.   PROMIS (reviewed every 90 days):The following assessments were completed by patient for this visit:  PROMIS 10-Global Health (only subscores and total score):       10/26/2023    10:36 PM 11/20/2023     2:17 PM 1/19/2024     11:39 AM 2/29/2024    12:43 PM 4/4/2024    12:44 PM 6/20/2024    12:33 PM 10/3/2024     1:38 PM   PROMIS-10 Scores Only   Global Mental Health Score 9 9 10 11 11 10 11   Global Physical Health Score 15 16 17 14 15 16 15   PROMIS TOTAL - SUBSCORES 24 25 27 25 26 26 26        Referral / Collaboration:  Referral to another professional/service is not indicated at this time.    Anticipated number of session for this episode of care:  50  Anticipation frequency of session: Every other week  Anticipated Duration of each session: 53 or more minutes  Treatment plan will be reviewed in 90 days or when goals have been changed. There has been demonstrated improvement in functioning while patient has been engaged in psychotherapy/psychological service- if withdrawn the patient would deteriorate and/or relapse.     MeasurableTreatment Goal(s) related to diagnosis / functional impairment(s)  Goal 1: Patient will work on stabilizing symptoms of ADHD.      Objective #A  Patient will learn about the diagnosis and symptoms of ADHD.   Status: Continued - Date(s): 10/3/2024    Intervention(s)  Therapist will teach about the diagnosis of ADHD.     Objective #B  Patient will practice setting goals and completing them.                         Status: Continued - Date(s): 10/3/2024     Intervention(s)  Therapist will teach SMART goals and how to break tasks down into smaller tasks.     Objective #C  Patient will learn 3 skills to help increase motivation and organization.   Status: Continued - Date(s): 10/3/2024    Intervention(s)  Therapist will assign homework of using coping skills to increase motivation and organizational skills.    Objective #D  Patient will learn about how ADHD can impact social interactions and interpersonal relationships.  Status:Continued - Date(s): 10/3/2024    Intervention(s)  Therapist will teach about how ADHD can impact social interactions and interpersonal relationships.     Objective #D  Patient will create  and adhere to a routine.  Status:Continued - Date(s): 10/3/2024    Intervention(s)  Therapist will assign homework to create a routine and teach about how to maintain routine.         Goal 2: Client will work on stabilizing anxiety symptoms as evidenced by LYUBOV-7 scores (current is 12) and Self report.  Goal is to reduce by three points.      I will know I've met my goal when I can better manage my anxiety .      Objective #A Client will identify triggers of anxiety and process them in session.    Status: Continued - Date(s): 10/3/2024    Intervention(s)  Therapist will teach emotional recognition/identification by assigning homework to journal with written exercises.    Objective #B  Client will identify initial signs or symptoms of anxiety.    Status: Continued - Date(s): 10/3/2024    Intervention(s)  Therapist will teach emotional regulation skills, such as deep breathing and mindfulness skills.    Objective #C  Client will learn about co-morbidities between ADHD and LYUBOV.   Status: Continued - Date(s): 10/3/2024    Intervention(s)  Therapist will provide educational materials on symptoms of ADHD and LYUBOV.          Patient has reviewed and agreed to the above plan.      TYE Marie  July 28, 2023  Reviewed 10/27/2023  Re-reviewed 1/19/2024 Re-reviewed 4/4/2024  Re-reviewed 6/20/2024 Re-reviewed 10/3/2024

## 2024-12-12 ENCOUNTER — VIRTUAL VISIT (OUTPATIENT)
Dept: PSYCHOLOGY | Facility: CLINIC | Age: 49
End: 2024-12-12
Payer: COMMERCIAL

## 2024-12-12 DIAGNOSIS — F41.1 GAD (GENERALIZED ANXIETY DISORDER): ICD-10-CM

## 2024-12-12 DIAGNOSIS — F90.0 ADHD (ATTENTION DEFICIT HYPERACTIVITY DISORDER), INATTENTIVE TYPE: Primary | ICD-10-CM

## 2024-12-12 NOTE — PROGRESS NOTES
[facility]                                     Progress Note    Patient Name: Tressa Jeong  Date: 12/12/2024      Service Type: Individual      Session Start Time: 2:00 PM  Session End Time: 2:55 PM     Session Length: 53+ min    Session #: 33    Attendees: Client attended alone    Service Modality:  Video Visit:      Provider verified identity through the following two step process.  Patient provided:  Patient is known previously to provider    Telemedicine Visit: The patient's condition can be safely assessed and treated via synchronous audio and visual telemedicine encounter.      Reason for Telemedicine Visit: Patient has requested telehealth visit    Originating Site (Patient Location): Patient's home    Distant Site (Provider Location): Provider Remote Setting- Home Office    Consent:  The patient/guardian has verbally consented to: the potential risks and benefits of telemedicine (video visit) versus in person care; bill my insurance or make self-payment for services provided; and responsibility for payment of non-covered services.     Patient would like the video invitation sent by:  My Chart    Mode of Communication:  Video Conference via [employer]    Distant Location (Provider):  Off-site    As the provider I attest to compliance with applicable laws and regulations related to telemedicine.    DATA  Extended Session (53+ minutes): PROLONGED SERVICE IN THE OUTPATIENT SETTING REQUIRING DIRECT (FACE-TO-FACE) PATIENT CONTACT BEYOND THE USUAL SERVICE:    - Patient's presenting concerns require more intensive intervention than could be completed within the usual service  Interactive Complexity: No  Crisis: No        Progress Since Last Session (Related to Symptoms / Goals / Homework):   Symptoms: No change based on patient self-report.     Homework: Partially completed      Episode of Care Goals: Minimal progress - PREPARATION (Decided to change - considering how); Intervened by negotiating  a change plan and determining options / strategies for behavior change, identifying triggers, exploring social supports, and working towards setting a date to begin behavior change     Current / Ongoing Stressors and Concerns:   Patient reports she is concerned about ADHD symptoms. Patient reports she is concerned about motivation. Patient reports she is concerned about working. Patient reports she is concerned about task completion. Patient reports she is concerned about prioritizing tasks. Patient reports her dad is home for the winter. Patient reports concerns about re-entering the workforce. Patient reports she is concerned about productivity. Patient reports concerns about organization. Patient reports she is job searching. Patient reports concerns about an internal conflict. Patient reports her dad went back to [place]. Patient's mom was hospitalized. Patient reports she is concerned about her sleep habits. Patient reports concerns about burn out. Patient reports concerns about the election results.      Treatment Objective(s) Addressed in This Session:   Patient will learn about the diagnosis and symptoms of ADHD.   Patient will learn about how ADHD can impact social interactions and interpersonal relationships.  Client will identify triggers of anxiety and process them in session.    Client will identify initial signs or symptoms of anxiety.   Patient will practice setting goals and completing them.           Patient will learn 3 skills to help increase motivation and organization.  Patient will create and adhere to a routine.     Intervention:   Motivational Interviewing: supported patient in processing and exploring her sleep routine.   Validated patient's emotions. Supported patient in exploring how she has been able to get herself a regular sleep schedule. Validated patient's use of skills. Supported patient in identifying what she needs to keep herself on this routine. Discussed adhering to her sleep  routine. Supported patient in exploring motivation and how her tablet impacts this. Reviewed putting parental controls on her tablet to help reduce usage times.  [facility]      Assessments completed prior to visit:  The following assessments were completed by patient for this visit:  PHQ9:       10/27/2023    11:00 AM 12/8/2023    10:45 AM 1/19/2024    11:00 AM 4/4/2024    12:00 PM 6/20/2024    12:00 PM 10/3/2024     2:00 PM 10/31/2024     2:00 PM   PHQ-9 SCORE   PHQ-9 Total Score [facility]  10 (Moderate depression)        PHQ-9 Total Score 15 10 9 11 13 12 14     GAD7:       5/2/2024     1:00 PM 6/20/2024    12:00 PM 7/25/2024    12:00 PM 8/22/2024     2:00 PM 10/3/2024     2:00 PM 10/31/2024     2:00 PM 11/27/2024     1:00 PM   LYUBOV-7 SCORE   Total Score 7 9 14 10 12 10 9     PROMIS 10-Global Health (only subscores and total score):       10/26/2023    10:36 PM 11/20/2023     2:17 PM 1/19/2024    11:39 AM 2/29/2024    12:43 PM 4/4/2024    12:44 PM 6/20/2024    12:33 PM 10/3/2024     1:38 PM   PROMIS-10 Scores Only   Global Mental Health Score 9 9 10 11 11 10 11   Global Physical Health Score 15 16 17 14 15 16 15   PROMIS TOTAL - SUBSCORES 24 25 27 25 26 26 26         ASSESSMENT: Current Emotional / Mental Status (status of significant symptoms):   Risk status (Self / Other harm or suicidal ideation)   Patient denies current fears or concerns for personal safety.   Patient denies current or recent suicidal ideation or behaviors.   Patient denies current or recent homicidal ideation or behaviors.   Patient denies current or recent self injurious behavior or ideation.   Patient denies other safety concerns.   Patient reports there has been no change in risk factors since their last session.     Patient reports there has been no change in protective factors since their last session.     Recommended that patient call 911 or go to the local ED should there be a change in any  of these risk factors     Appearance:   Appropriate    Eye Contact:   Good    Psychomotor Behavior: Normal    Attitude:   Cooperative    Orientation:   All   Speech    Rate / Production: Hyperverbal     Volume:  Normal    Mood:    Anxious    Affect:    Worrisome    Thought Content:  Rumination    Thought Form:  Logical    Insight:    Good      Medication Review:   No changes to current psychiatric medication(s)     Medication Compliance:   Yes     Changes in Health Issues:   None reported     Chemical Use Review:   Substance Use: Chemical use reviewed, no active concerns identified      Tobacco Use: No current tobacco use.      Diagnosis:  1. ADHD (attention deficit hyperactivity disorder), inattentive type    2. LYUBOV (generalized anxiety disorder)        Collateral Reports Completed:   Not Applicable    PLAN: (Patient Tasks / Therapist Tasks / Other)  Patient will set up parental controls on her tablet. Patient will adhere to her sleep routine. Patient will update her insurance through [employer].   We will discuss next session.       Aline Jimenez, [facility] Hospital[facility] 12/12/2024    ___________________________________________________________                                              Individual Treatment Plan    Patient's Name: Tressa Jeong  YOB: 1975    Date of Creation: 7/28/2023  Date Treatment Plan Last Reviewed/Revised: 10/3/2024  DSM5 Diagnoses: Attention-Deficit/Hyperactivity Disorder  314.00 (F90.0) Predominantly inattentive presentation or 300.02 (F41.1) Generalized Anxiety Disorder  Psychosocial / Contextual Factors: Patient is currently unemployed. Patient is living with her dad part time. Patient is single.   PROMIS (reviewed every 90 days):The following assessments were completed by patient for this visit:  PROMIS 10-Global Health (only subscores and total score):       10/26/2023    10:36 PM 11/20/2023     2:17 PM 1/19/2024    11:39 AM 2/29/2024    12:43 PM 4/4/2024    12:44 PM 6/20/2024    12:33 PM  10/3/2024     1:38 PM   PROMIS-10 Scores Only   Global Mental Health Score 9 9 10 11 11 10 11   Global Physical Health Score 15 16 17 14 15 16 15   PROMIS TOTAL - SUBSCORES 24 25 27 25 26 26 26        Referral / Collaboration:  Referral to another professional/service is not indicated at this time.    Anticipated number of session for this episode of care:  50  Anticipation frequency of session: Every other week  Anticipated Duration of each session: 53 or more minutes  Treatment plan will be reviewed in 90 days or when goals have been changed. There has been demonstrated improvement in functioning while patient has been engaged in psychotherapy/psychological service- if withdrawn the patient would deteriorate and/or relapse.     MeasurableTreatment Goal(s) related to diagnosis / functional impairment(s)  Goal 1: Patient will work on stabilizing symptoms of ADHD.      Objective #A  Patient will learn about the diagnosis and symptoms of ADHD.   Status: Continued - Date(s): 10/3/2024    Intervention(s)  Therapist will teach about the diagnosis of ADHD.     Objective #B  Patient will practice setting goals and completing them.                         Status: Continued - Date(s): 10/3/2024     Intervention(s)  Therapist will teach SMART goals and how to break tasks down into smaller tasks.     Objective #C  Patient will learn 3 skills to help increase motivation and organization.   Status: Continued - Date(s): 10/3/2024    Intervention(s)  Therapist will assign homework of using coping skills to increase motivation and organizational skills.    Objective #D  Patient will learn about how ADHD can impact social interactions and interpersonal relationships.  Status:Continued - Date(s): 10/3/2024    Intervention(s)  Therapist will teach about how ADHD can impact social interactions and interpersonal relationships.     Objective #D  Patient will create and adhere to a routine.  Status:Continued - Date(s):  10/3/2024    Intervention(s)  Therapist will assign homework to create a routine and teach about how to maintain routine.         Goal 2: Client will work on stabilizing anxiety symptoms as evidenced by LYUBOV-7 scores (current is 12) and Self report.  Goal is to reduce by three points.      I will know I've met my goal when I can better manage my anxiety .      Objective #A Client will identify triggers of anxiety and process them in session.    Status: Continued - Date(s): 10/3/2024    Intervention(s)  Therapist will teach emotional recognition/identification by assigning homework to journal with written exercises.    Objective #B  Client will identify initial signs or symptoms of anxiety.    Status: Continued - Date(s): 10/3/2024    Intervention(s)  Therapist will teach emotional regulation skills, such as deep breathing and mindfulness skills.    Objective #C  Client will learn about co-morbidities between ADHD and LYUBOV.   Status: Continued - Date(s): 10/3/2024    Intervention(s)  Therapist will provide educational materials on symptoms of ADHD and LYUBOV.          Patient has reviewed and agreed to the above plan.      TYE Marie  July 28, 2023  Reviewed 10/27/2023  Re-reviewed 1/19/2024 Re-reviewed 4/4/2024  Re-reviewed 6/20/2024 Re-reviewed 10/3/2024

## 2025-01-02 ENCOUNTER — VIRTUAL VISIT (OUTPATIENT)
Dept: PSYCHOLOGY | Facility: CLINIC | Age: 50
End: 2025-01-02
Payer: COMMERCIAL

## 2025-01-02 DIAGNOSIS — F41.1 GAD (GENERALIZED ANXIETY DISORDER): ICD-10-CM

## 2025-01-02 DIAGNOSIS — F90.0 ADHD (ATTENTION DEFICIT HYPERACTIVITY DISORDER), INATTENTIVE TYPE: Primary | ICD-10-CM

## 2025-01-02 ASSESSMENT — ANXIETY QUESTIONNAIRES
7. FEELING AFRAID AS IF SOMETHING AWFUL MIGHT HAPPEN: NOT AT ALL
8. IF YOU CHECKED OFF ANY PROBLEMS, HOW DIFFICULT HAVE THESE MADE IT FOR YOU TO DO YOUR WORK, TAKE CARE OF THINGS AT HOME, OR GET ALONG WITH OTHER PEOPLE?: VERY DIFFICULT
IF YOU CHECKED OFF ANY PROBLEMS ON THIS QUESTIONNAIRE, HOW DIFFICULT HAVE THESE PROBLEMS MADE IT FOR YOU TO DO YOUR WORK, TAKE CARE OF THINGS AT HOME, OR GET ALONG WITH OTHER PEOPLE: VERY DIFFICULT
GAD7 TOTAL SCORE: 14
GAD7 TOTAL SCORE: 14
5. BEING SO RESTLESS THAT IT IS HARD TO SIT STILL: SEVERAL DAYS
4. TROUBLE RELAXING: NEARLY EVERY DAY
2. NOT BEING ABLE TO STOP OR CONTROL WORRYING: MORE THAN HALF THE DAYS
GAD7 TOTAL SCORE: 14
6. BECOMING EASILY ANNOYED OR IRRITABLE: NEARLY EVERY DAY
1. FEELING NERVOUS, ANXIOUS, OR ON EDGE: NEARLY EVERY DAY
7. FEELING AFRAID AS IF SOMETHING AWFUL MIGHT HAPPEN: NOT AT ALL
3. WORRYING TOO MUCH ABOUT DIFFERENT THINGS: MORE THAN HALF THE DAYS

## 2025-01-02 ASSESSMENT — COLUMBIA-SUICIDE SEVERITY RATING SCALE - C-SSRS
TOTAL  NUMBER OF ABORTED OR SELF INTERRUPTED ATTEMPTS SINCE LAST CONTACT: NO
6. HAVE YOU EVER DONE ANYTHING, STARTED TO DO ANYTHING, OR PREPARED TO DO ANYTHING TO END YOUR LIFE?: NO
SUICIDE, SINCE LAST CONTACT: NO
1. SINCE LAST CONTACT, HAVE YOU WISHED YOU WERE DEAD OR WISHED YOU COULD GO TO SLEEP AND NOT WAKE UP?: NO
2. HAVE YOU ACTUALLY HAD ANY THOUGHTS OF KILLING YOURSELF?: NO
TOTAL  NUMBER OF INTERRUPTED ATTEMPTS SINCE LAST CONTACT: NO
ATTEMPT SINCE LAST CONTACT: NO

## 2025-01-02 ASSESSMENT — PATIENT HEALTH QUESTIONNAIRE - PHQ9: SUM OF ALL RESPONSES TO PHQ QUESTIONS 1-9: 11

## 2025-01-02 NOTE — PROGRESS NOTES
[facility]                                     Progress Note    Patient Name: Tressa Jeong  Date: 1/2/2025      Service Type: Individual      Session Start Time: 2:00 PM  Session End Time: 2:53 PM     Session Length: 53+ min    Session #: 34    Attendees: Client attended alone    Service Modality:  Video Visit:      Provider verified identity through the following two step process.  Patient provided:  Patient is known previously to provider    Telemedicine Visit: The patient's condition can be safely assessed and treated via synchronous audio and visual telemedicine encounter.      Reason for Telemedicine Visit: Patient has requested telehealth visit    Originating Site (Patient Location): Patient's home    Distant Site (Provider Location): Provider Remote Setting- Home Office    Consent:  The patient/guardian has verbally consented to: the potential risks and benefits of telemedicine (video visit) versus in person care; bill my insurance or make self-payment for services provided; and responsibility for payment of non-covered services.     Patient would like the video invitation sent by:  My Chart    Mode of Communication:  Video Conference via [employer]well    Distant Location (Provider):  Off-site    As the provider I attest to compliance with applicable laws and regulations related to telemedicine.    DATA  Extended Session (53+ minutes): PROLONGED SERVICE IN THE OUTPATIENT SETTING REQUIRING DIRECT (FACE-TO-FACE) PATIENT CONTACT BEYOND THE USUAL SERVICE:    - Patient's presenting concerns require more intensive intervention than could be completed within the usual service  Interactive Complexity: No  Crisis: No        Progress Since Last Session (Related to Symptoms / Goals / Homework):   Symptoms: Improving symptoms of depression and worsening symptoms of anxiety based on PHQ-9 and LYUBOV-7 scores and patient self-report.     Homework: Partially completed      Episode of Care Goals: Minimal  progress - PREPARATION (Decided to change - considering how); Intervened by negotiating a change plan and determining options / strategies for behavior change, identifying triggers, exploring social supports, and working towards setting a date to begin behavior change     Current / Ongoing Stressors and Concerns:   Patient reports she is concerned about ADHD symptoms. Patient reports she is concerned about motivation. Patient reports she is concerned about working. Patient reports she is concerned about task completion. Patient reports she is concerned about prioritizing tasks. Patient reports her dad is home for the winter. Patient reports concerns about re-entering the workforce. Patient reports she is concerned about productivity. Patient reports concerns about organization. Patient reports she is job searching. Patient reports concerns about an internal conflict. Patient reports her dad went back to [place]. Patient's mom was hospitalized. Patient reports she is concerned about her sleep habits. Patient reports concerns about burn out. Patient reports concerns about the election results. Patient reports concerns about an internal conflict.      Treatment Objective(s) Addressed in This Session:   Patient will learn about the diagnosis and symptoms of ADHD.   Patient will learn about how ADHD can impact social interactions and interpersonal relationships.   Patient will practice setting goals and completing them.           Patient will learn 3 skills to help increase motivation and organization.  Patient will increase interpersonal effectiveness skills with family and friends to help manage conflict.    Patient will build upon the authentic self.         Patient will bring to session interpersonal conflicts to process and explore.   Patient will create and adhere to a routine.     Intervention:   Motivational Interviewing: supported patient in processing and exploring her sleep routine.   Validated patient's of  skills to maintain her sleep routine. Reviewed setting a bedtime routine to help cue her body for sleep. Supported patient in creating a bed time routine and discussed practicing implementing this routine. Supported patient in processing and exploring motivation. Validated patient's use of skills. Discussed continuing to complete tasks in the morning when motivation is higher. Supported patient in processing and exploring an internal conflict. Validated patient's emotions. Discussed broken record skill to maintain her boundaries with her dad. Discussed being mindful of how effective these skills are.    Reviewed treatment plan and goals with patient.      Assessments completed prior to visit:  The following assessments were completed by patient for this visit:  PHQ9:       12/8/2023    10:45 AM 1/19/2024    11:00 AM 4/4/2024    12:00 PM 6/20/2024    12:00 PM 10/3/2024     2:00 PM 10/31/2024     2:00 PM 1/2/2025     2:00 PM   PHQ-9 SCORE   PHQ-9 Total Score MyChart 10 (Moderate depression)         PHQ-9 Total Score 10 9 11 13 12 14 11     GAD7:       6/20/2024    12:00 PM 7/25/2024    12:00 PM 8/22/2024     2:00 PM 10/3/2024     2:00 PM 10/31/2024     2:00 PM 11/27/2024     1:00 PM 1/2/2025     1:54 PM   LYUBOV-7 SCORE   Total Score       14 (moderate anxiety)   Total Score 9 14 10 12 10 9 14        Patient-reported     PROMIS 10-Global Health (only subscores and total score):       11/20/2023     2:17 PM 1/19/2024    11:39 AM 2/29/2024    12:43 PM 4/4/2024    12:44 PM 6/20/2024    12:33 PM 10/3/2024     1:38 PM 1/2/2025     1:54 PM   PROMIS-10 Scores Only   Global Mental Health Score 9 10 11 11 10 11 10    Global Physical Health Score 16 17 14 15 16 15 16    PROMIS TOTAL - SUBSCORES 25 27 25 26 26 26 26        Patient-reported         ASSESSMENT: Current Emotional / Mental Status (status of significant symptoms):   Risk status (Self / Other harm or suicidal ideation)   Patient denies current fears or concerns for  personal safety.   Patient denies current or recent suicidal ideation or behaviors.   Patient denies current or recent homicidal ideation or behaviors.   Patient denies current or recent self injurious behavior or ideation.   Patient denies other safety concerns.   Patient reports there has been no change in risk factors since their last session.     Patient reports there has been no change in protective factors since their last session.     Recommended that patient call 911 or go to the local ED should there be a change in any of these risk factors     Appearance:   Appropriate    Eye Contact:   Good    Psychomotor Behavior: Normal    Attitude:   Cooperative    Orientation:   All   Speech    Rate / Production: Hyperverbal     Volume:  Normal    Mood:    Anxious    Affect:    Worrisome    Thought Content:  Rumination    Thought Form:  Logical    Insight:    Good      Medication Review:   No changes to current psychiatric medication(s)     Medication Compliance:   Yes     Changes in Health Issues:   None reported     Chemical Use Review:   Substance Use: Chemical use reviewed, no active concerns identified      Tobacco Use: No current tobacco use.      Diagnosis:  1. ADHD (attention deficit hyperactivity disorder), inattentive type    2. LYUBOV (generalized anxiety disorder)        Collateral Reports Completed:   Not Applicable    PLAN: (Patient Tasks / Therapist Tasks / Other)  Patient will practice using broken record to maintain her boundaries with her dad. Patient will adhere to her bedtime routine. Patient will practice completing tasks in the morning to increase motivation. Patient will be mindful of how effective this is.   We will discuss next session.       Aline Jimenez, LICSW 1/2/2025    ___________________________________________________________                                              Individual Treatment Plan    Patient's Name: Tressa Jeong  YOB: 1975    Date of Creation: 7/28/2023  Date  Treatment Plan Last Reviewed/Revised: 1/2/2025  DSM5 Diagnoses: Attention-Deficit/Hyperactivity Disorder  314.00 (F90.0) Predominantly inattentive presentation or 300.02 (F41.1) Generalized Anxiety Disorder  Psychosocial / Contextual Factors: Patient is currently unemployed. Patient is living with her dad part time. Patient is single.   PROMIS (reviewed every 90 days):The following assessments were completed by patient for this visit:  PROMIS 10-Global Health (only subscores and total score):       11/20/2023     2:17 PM 1/19/2024    11:39 AM 2/29/2024    12:43 PM 4/4/2024    12:44 PM 6/20/2024    12:33 PM 10/3/2024     1:38 PM 1/2/2025     1:54 PM   PROMIS-10 Scores Only   Global Mental Health Score 9 10 11 11 10 11 10    Global Physical Health Score 16 17 14 15 16 15 16    PROMIS TOTAL - SUBSCORES 25 27 25 26 26 26 26        Patient-reported        Referral / Collaboration:  Referral to another professional/service is not indicated at this time.    Anticipated number of session for this episode of care:  50  Anticipation frequency of session: Every other week  Anticipated Duration of each session: 53 or more minutes  Treatment plan will be reviewed in 90 days or when goals have been changed. There has been demonstrated improvement in functioning while patient has been engaged in psychotherapy/psychological service- if withdrawn the patient would deteriorate and/or relapse.     MeasurableTreatment Goal(s) related to diagnosis / functional impairment(s)  Goal 1: Patient will work on stabilizing symptoms of ADHD.      Objective #A  Patient will learn about the diagnosis and symptoms of ADHD.   Status: Continued - Date(s): 1/2/2025    Intervention(s)  Therapist will teach about the diagnosis of ADHD.     Objective #B  Patient will practice setting goals and completing them.                         Status: Continued - Date(s): 1/2/2025     Intervention(s)  Therapist will teach SMART goals and how to break tasks down  into smaller tasks.     Objective #C  Patient will learn 3 skills to help increase motivation and organization.   Status: Continued - Date(s): 1/2/2025    Intervention(s)  Therapist will assign homework of using coping skills to increase motivation and organizational skills.    Objective #D  Patient will learn about how ADHD can impact social interactions and interpersonal relationships.  Status:Continued - Date(s): 1/2/2025    Intervention(s)  Therapist will teach about how ADHD can impact social interactions and interpersonal relationships.     Objective #D  Patient will create and adhere to a routine.  Status:Continued - Date(s): 1/2/2025    Intervention(s)  Therapist will assign homework to create a routine and teach about how to maintain routine.         Goal 2: Client will work on stabilizing anxiety symptoms as evidenced by LYUBOV-7 scores (current is 14) and Self report.  Goal is to reduce by three points.      I will know I've met my goal when I can better manage my anxiety .      Objective #A Client will identify triggers of anxiety and process them in session.    Status: Continued - Date(s): 1/2/2025    Intervention(s)  Therapist will teach emotional recognition/identification by assigning homework to journal with written exercises.    Objective #B  Client will identify initial signs or symptoms of anxiety.    Status: Continued - Date(s): 1/2/2025    Intervention(s)  Therapist will teach emotional regulation skills, such as deep breathing and mindfulness skills.    Objective #C  Client will learn about co-morbidities between ADHD and LYUBOV.   Status: Continued - Date(s): 1/2/2025    Intervention(s)  Therapist will provide educational materials on symptoms of ADHD and LYUBOV.        Goal 3: Patient will increase effectiveness of interpersonal communication skills.     I will know I've met my goal when I can hold my boundaries with dad.       Objective #A  Patient will increase interpersonal effectiveness skills  with family and friends to help manage conflict.    Status: New - Date: 1/2/2025     Intervention(s)  Therapist will role-play conflict management.     Objective #B  Patient will build upon the authentic self.         Status:New - Date: 1/2/2025     Intervention(s)  Therapist will assign homework through written exercises.     Objective #C  Patient will bring to session interpersonal conflicts to process and explore.   Status: New - Date: 1/2/2025     Intervention(s)  Therapist will teach about healthy boundaries related to interpersonal relationships.      Patient has reviewed and agreed to the above plan.      TYE Marie  July 28, 2023  Reviewed 10/27/2023  Re-reviewed 1/19/2024 Re-reviewed 4/4/2024  Re-reviewed 6/20/2024 Re-reviewed 10/3/2024 Re-reviewed 1/2/2025

## 2025-01-16 ENCOUNTER — VIRTUAL VISIT (OUTPATIENT)
Dept: PSYCHOLOGY | Facility: CLINIC | Age: 50
End: 2025-01-16
Payer: COMMERCIAL

## 2025-01-16 DIAGNOSIS — F41.1 GAD (GENERALIZED ANXIETY DISORDER): ICD-10-CM

## 2025-01-16 DIAGNOSIS — F90.0 ADHD (ATTENTION DEFICIT HYPERACTIVITY DISORDER), INATTENTIVE TYPE: Primary | ICD-10-CM

## 2025-01-16 NOTE — PROGRESS NOTES
[facility]                                     Progress Note    Patient Name: Tressa Jeong  Date: 1/16/2025      Service Type: Individual      Session Start Time: 2:01 PM  Session End Time: 2:55 PM     Session Length: 53+ min    Session #: 35    Attendees: Client attended alone    Service Modality:  Video Visit:      Provider verified identity through the following two step process.  Patient provided:  Patient is known previously to provider    Telemedicine Visit: The patient's condition can be safely assessed and treated via synchronous audio and visual telemedicine encounter.      Reason for Telemedicine Visit: Patient has requested telehealth visit    Originating Site (Patient Location): Patient's home    Distant Site (Provider Location): Provider Remote Setting- Home Office    Consent:  The patient/guardian has verbally consented to: the potential risks and benefits of telemedicine (video visit) versus in person care; bill my insurance or make self-payment for services provided; and responsibility for payment of non-covered services.     Patient would like the video invitation sent by:  My Chart    Mode of Communication:  Video Conference via [employer]    Distant Location (Provider):  Off-site    As the provider I attest to compliance with applicable laws and regulations related to telemedicine.    DATA  Extended Session (53+ minutes): PROLONGED SERVICE IN THE OUTPATIENT SETTING REQUIRING DIRECT (FACE-TO-FACE) PATIENT CONTACT BEYOND THE USUAL SERVICE:    - Patient's presenting concerns require more intensive intervention than could be completed within the usual service  Interactive Complexity: No  Crisis: No        Progress Since Last Session (Related to Symptoms / Goals / Homework):   Symptoms: No change based on patient self-report.     Homework: Partially completed      Episode of Care Goals: Minimal progress - ACTION (Actively working towards change); Intervened by reinforcing change  plan / affirming steps taken     Current / Ongoing Stressors and Concerns:   Patient reports she is concerned about ADHD symptoms. Patient reports she is concerned about motivation. Patient reports she is concerned about working. Patient reports she is concerned about task completion. Patient reports she is concerned about prioritizing tasks. Patient reports her dad is home for the winter. Patient reports concerns about re-entering the workforce. Patient reports she is concerned about productivity. Patient reports concerns about organization. Patient reports she is job searching. Patient reports concerns about an internal conflict. Patient reports her dad went back to [place]. Patient's mom was hospitalized. Patient reports she is concerned about her sleep habits. Patient reports concerns about burn out. Patient reports concerns about the election results. Patient reports concerns about an internal conflict.      Treatment Objective(s) Addressed in This Session:   Patient will learn about the diagnosis and symptoms of ADHD.   Patient will learn about how ADHD can impact social interactions and interpersonal relationships.   Patient will practice setting goals and completing them.           Patient will learn 3 skills to help increase motivation and organization.  Patient will increase interpersonal effectiveness skills with family and friends to help manage conflict.    Patient will build upon the authentic self.         Patient will bring to session interpersonal conflicts to process and explore.   Patient will create and adhere to a routine.     Intervention:   Motivational Interviewing: supported patient in processing and exploring motivation.   Validated patient's emotions. Supported patient in exploring how she was able to start tasks right away in the morning. Supported patient in exploring how this impacted her motivation levels. Validated patient's use of skills. Discussed continuing to engage in tasks right  away in the morning. Discussed SMART goals. Supported patient in identifying a SMART goal to work towards.       Assessments completed prior to visit:  The following assessments were completed by patient for this visit:  PHQ9:       12/8/2023    10:45 AM 1/19/2024    11:00 AM 4/4/2024    12:00 PM 6/20/2024    12:00 PM 10/3/2024     2:00 PM 10/31/2024     2:00 PM 1/2/2025     2:00 PM   PHQ-9 SCORE   PHQ-9 Total Score MyChart 10 (Moderate depression)         PHQ-9 Total Score 10 9 11 13 12 14 11     GAD7:       6/20/2024    12:00 PM 7/25/2024    12:00 PM 8/22/2024     2:00 PM 10/3/2024     2:00 PM 10/31/2024     2:00 PM 11/27/2024     1:00 PM 1/2/2025     1:54 PM   LYUBOV-7 SCORE   Total Score       14 (moderate anxiety)   Total Score 9 14 10 12 10 9 14        Patient-reported     PROMIS 10-Global Health (only subscores and total score):       11/20/2023     2:17 PM 1/19/2024    11:39 AM 2/29/2024    12:43 PM 4/4/2024    12:44 PM 6/20/2024    12:33 PM 10/3/2024     1:38 PM 1/2/2025     1:54 PM   PROMIS-10 Scores Only   Global Mental Health Score 9 10 11 11 10 11 10    Global Physical Health Score 16 17 14 15 16 15 16    PROMIS TOTAL - SUBSCORES 25 27 25 26 26 26 26        Patient-reported         ASSESSMENT: Current Emotional / Mental Status (status of significant symptoms):   Risk status (Self / Other harm or suicidal ideation)   Patient denies current fears or concerns for personal safety.   Patient denies current or recent suicidal ideation or behaviors.   Patient denies current or recent homicidal ideation or behaviors.   Patient denies current or recent self injurious behavior or ideation.   Patient denies other safety concerns.   Patient reports there has been no change in risk factors since their last session.     Patient reports there has been no change in protective factors since their last session.     Recommended that patient call 911 or go to the local ED should there be a change in any of these risk  factors     Appearance:   Appropriate    Eye Contact:   Good    Psychomotor Behavior: Normal    Attitude:   Cooperative    Orientation:   All   Speech    Rate / Production: Hyperverbal     Volume:  Normal    Mood:    Anxious    Affect:    Worrisome    Thought Content:  Rumination    Thought Form:  Logical    Insight:    Good      Medication Review:   No changes to current psychiatric medication(s)     Medication Compliance:   Yes     Changes in Health Issues:   None reported     Chemical Use Review:   Substance Use: Chemical use reviewed, no active concerns identified      Tobacco Use: No current tobacco use.      Diagnosis:  1. ADHD (attention deficit hyperactivity disorder), inattentive type    2. LYUBOV (generalized anxiety disorder)        Collateral Reports Completed:   Not Applicable    PLAN: (Patient Tasks / Therapist Tasks / Other)  Patient will practice doing tasks right away in the morning to increase motivation. Patient will set a SMART goal to accomplish.   We will discuss next session.       Aline Jimenez, [facility] CenterSW 1/16/2025    ___________________________________________________________                                              Individual Treatment Plan    Patient's Name: Tressa Jeong  YOB: 1975    Date of Creation: 7/28/2023  Date Treatment Plan Last Reviewed/Revised: 1/2/2025  DSM5 Diagnoses: Attention-Deficit/Hyperactivity Disorder  314.00 (F90.0) Predominantly inattentive presentation or 300.02 (F41.1) Generalized Anxiety Disorder  Psychosocial / Contextual Factors: Patient is currently unemployed. Patient is living with her dad part time. Patient is single.   PROMIS (reviewed every 90 days):The following assessments were completed by patient for this visit:  PROMIS 10-Global Health (only subscores and total score):       11/20/2023     2:17 PM 1/19/2024    11:39 AM 2/29/2024    12:43 PM 4/4/2024    12:44 PM 6/20/2024    12:33 PM 10/3/2024     1:38 PM 1/2/2025     1:54 PM   PROMIS-10  Scores Only   Global Mental Health Score 9 10 11 11 10 11 10    Global Physical Health Score 16 17 14 15 16 15 16    PROMIS TOTAL - SUBSCORES 25 27 25 26 26 26 26        Patient-reported        Referral / Collaboration:  Referral to another professional/service is not indicated at this time.    Anticipated number of session for this episode of care:  50  Anticipation frequency of session: Every other week  Anticipated Duration of each session: 53 or more minutes  Treatment plan will be reviewed in 90 days or when goals have been changed. There has been demonstrated improvement in functioning while patient has been engaged in psychotherapy/psychological service- if withdrawn the patient would deteriorate and/or relapse.     MeasurableTreatment Goal(s) related to diagnosis / functional impairment(s)  Goal 1: Patient will work on stabilizing symptoms of ADHD.      Objective #A  Patient will learn about the diagnosis and symptoms of ADHD.   Status: Continued - Date(s): 1/2/2025    Intervention(s)  Therapist will teach about the diagnosis of ADHD.     Objective #B  Patient will practice setting goals and completing them.                         Status: Continued - Date(s): 1/2/2025     Intervention(s)  Therapist will teach SMART goals and how to break tasks down into smaller tasks.     Objective #C  Patient will learn 3 skills to help increase motivation and organization.   Status: Continued - Date(s): 1/2/2025    Intervention(s)  Therapist will assign homework of using coping skills to increase motivation and organizational skills.    Objective #D  Patient will learn about how ADHD can impact social interactions and interpersonal relationships.  Status:Continued - Date(s): 1/2/2025    Intervention(s)  Therapist will teach about how ADHD can impact social interactions and interpersonal relationships.     Objective #D  Patient will create and adhere to a routine.  Status:Continued - Date(s):  1/2/2025    Intervention(s)  Therapist will assign homework to create a routine and teach about how to maintain routine.         Goal 2: Client will work on stabilizing anxiety symptoms as evidenced by LYUBOV-7 scores (current is 14) and Self report.  Goal is to reduce by three points.      I will know I've met my goal when I can better manage my anxiety .      Objective #A Client will identify triggers of anxiety and process them in session.    Status: Continued - Date(s): 1/2/2025    Intervention(s)  Therapist will teach emotional recognition/identification by assigning homework to journal with written exercises.    Objective #B  Client will identify initial signs or symptoms of anxiety.    Status: Continued - Date(s): 1/2/2025    Intervention(s)  Therapist will teach emotional regulation skills, such as deep breathing and mindfulness skills.    Objective #C  Client will learn about co-morbidities between ADHD and LYUBOV.   Status: Continued - Date(s): 1/2/2025    Intervention(s)  Therapist will provide educational materials on symptoms of ADHD and LYUBOV.        Goal 3: Patient will increase effectiveness of interpersonal communication skills.     I will know I've met my goal when I can hold my boundaries with dad.       Objective #A  Patient will increase interpersonal effectiveness skills with family and friends to help manage conflict.    Status: New - Date: 1/2/2025     Intervention(s)  Therapist will role-play conflict management.     Objective #B  Patient will build upon the authentic self.         Status:New - Date: 1/2/2025     Intervention(s)  Therapist will assign homework through written exercises.     Objective #C  Patient will bring to session interpersonal conflicts to process and explore.   Status: New - Date: 1/2/2025     Intervention(s)  Therapist will teach about healthy boundaries related to interpersonal relationships.      Patient has reviewed and agreed to the above plan.      Aline Jimenez, [facility] HospitalSW  July  28, 2023  Reviewed 10/27/2023  Re-reviewed 1/19/2024 Re-reviewed 4/4/2024  Re-reviewed 6/20/2024 Re-reviewed 10/3/2024 Re-reviewed 1/2/2025

## 2025-01-30 ENCOUNTER — VIRTUAL VISIT (OUTPATIENT)
Dept: PSYCHOLOGY | Facility: CLINIC | Age: 50
End: 2025-01-30
Payer: COMMERCIAL

## 2025-01-30 DIAGNOSIS — F90.0 ADHD (ATTENTION DEFICIT HYPERACTIVITY DISORDER), INATTENTIVE TYPE: Primary | ICD-10-CM

## 2025-01-30 DIAGNOSIS — F41.1 GAD (GENERALIZED ANXIETY DISORDER): ICD-10-CM

## 2025-01-30 ASSESSMENT — ANXIETY QUESTIONNAIRES
5. BEING SO RESTLESS THAT IT IS HARD TO SIT STILL: NOT AT ALL
7. FEELING AFRAID AS IF SOMETHING AWFUL MIGHT HAPPEN: NOT AT ALL
2. NOT BEING ABLE TO STOP OR CONTROL WORRYING: SEVERAL DAYS
3. WORRYING TOO MUCH ABOUT DIFFERENT THINGS: MORE THAN HALF THE DAYS
4. TROUBLE RELAXING: MORE THAN HALF THE DAYS
GAD7 TOTAL SCORE: 9
GAD7 TOTAL SCORE: 9
1. FEELING NERVOUS, ANXIOUS, OR ON EDGE: NEARLY EVERY DAY
IF YOU CHECKED OFF ANY PROBLEMS ON THIS QUESTIONNAIRE, HOW DIFFICULT HAVE THESE PROBLEMS MADE IT FOR YOU TO DO YOUR WORK, TAKE CARE OF THINGS AT HOME, OR GET ALONG WITH OTHER PEOPLE: VERY DIFFICULT
6. BECOMING EASILY ANNOYED OR IRRITABLE: SEVERAL DAYS

## 2025-01-30 ASSESSMENT — PATIENT HEALTH QUESTIONNAIRE - PHQ9: SUM OF ALL RESPONSES TO PHQ QUESTIONS 1-9: 11

## 2025-01-30 NOTE — PROGRESS NOTES
[facility]                                     Progress Note    Patient Name: Tressa Jeong  Date: 1/30/2025      Service Type: Individual      Session Start Time: 2:00 PM  Session End Time: 2:54 PM     Session Length: 53+ min    Session #: 36    Attendees: Client attended alone    Service Modality:  Video Visit:      Provider verified identity through the following two step process.  Patient provided:  Patient is known previously to provider    Telemedicine Visit: The patient's condition can be safely assessed and treated via synchronous audio and visual telemedicine encounter.      Reason for Telemedicine Visit: Patient has requested telehealth visit    Originating Site (Patient Location): Patient's home    Distant Site (Provider Location): Provider Remote Setting- Home Office    Consent:  The patient/guardian has verbally consented to: the potential risks and benefits of telemedicine (video visit) versus in person care; bill my insurance or make self-payment for services provided; and responsibility for payment of non-covered services.     Patient would like the video invitation sent by:  My Chart    Mode of Communication:  Video Conference via [employer]    Distant Location (Provider):  Off-site    As the provider I attest to compliance with applicable laws and regulations related to telemedicine.    DATA  Extended Session (53+ minutes): PROLONGED SERVICE IN THE OUTPATIENT SETTING REQUIRING DIRECT (FACE-TO-FACE) PATIENT CONTACT BEYOND THE USUAL SERVICE:    - Patient's presenting concerns require more intensive intervention than could be completed within the usual service  Interactive Complexity: No  Crisis: No        Progress Since Last Session (Related to Symptoms / Goals / Homework):   Symptoms: No change in symptoms of depression and improving symptoms of anxiety based on PHQ-9 and LYUBOV-7 scores and patient self-report.    Homework: Partially completed      Episode of Care Goals: Minimal  progress - ACTION (Actively working towards change); Intervened by reinforcing change plan / affirming steps taken     Current / Ongoing Stressors and Concerns:   Patient reports she is concerned about ADHD symptoms. Patient reports she is concerned about motivation. Patient reports she is concerned about working. Patient reports she is concerned about task completion. Patient reports she is concerned about prioritizing tasks. Patient reports her dad is home for the winter. Patient reports concerns about re-entering the workforce. Patient reports she is concerned about productivity. Patient reports concerns about organization. Patient reports she is job searching. Patient reports concerns about an internal conflict. Patient reports her dad went back to [place]. Patient's mom was hospitalized. Patient reports she is concerned about her sleep habits. Patient reports concerns about burn out. Patient reports concerns about the election results. Patient reports concerns about an internal conflict. Patient reports concerns about the political climate.      Treatment Objective(s) Addressed in This Session:   Patient will learn about the diagnosis and symptoms of ADHD.   Patient will learn about how ADHD can impact social interactions and interpersonal relationships.   Patient will practice setting goals and completing them.           Patient will learn 3 skills to help increase motivation and organization.  Patient will increase interpersonal effectiveness skills with family and friends to help manage conflict.    Patient will build upon the authentic self.         Patient will bring to session interpersonal conflicts to process and explore.   Patient will create and adhere to a routine.     Intervention:   Motivational Interviewing: supported patient in processing and exploring symptoms of anxiety related to politics.   Validated patient's emotions. Supported patient in exploring how she has been coping. Validated  patient's use of skills. Reviewed how anxiety is looking for control. Discussed shifting her focus of control towards what she can control and away from what she cannot. Supported patient in identifying what she can control. Discussed using distraction skills to help give herself a break from news consumption. Discussed being mindful of how effective these skills are.       Assessments completed prior to visit:  The following assessments were completed by patient for this visit:  PHQ9:       1/19/2024    11:00 AM 4/4/2024    12:00 PM 6/20/2024    12:00 PM 10/3/2024     2:00 PM 10/31/2024     2:00 PM 1/2/2025     2:00 PM 1/30/2025     2:00 PM   PHQ-9 SCORE   PHQ-9 Total Score 9 11 13 12 14 11 11     GAD7:       7/25/2024    12:00 PM 8/22/2024     2:00 PM 10/3/2024     2:00 PM 10/31/2024     2:00 PM 11/27/2024     1:00 PM 1/2/2025     1:54 PM 1/30/2025     2:00 PM   LYUBOV-7 SCORE   Total Score      14 (moderate anxiety)    Total Score 14 10 12 10 9 14  9       Patient-reported     PROMIS 10-Global Health (only subscores and total score):       11/20/2023     2:17 PM 1/19/2024    11:39 AM 2/29/2024    12:43 PM 4/4/2024    12:44 PM 6/20/2024    12:33 PM 10/3/2024     1:38 PM 1/2/2025     1:54 PM   PROMIS-10 Scores Only   Global Mental Health Score 9 10 11 11 10 11 10    Global Physical Health Score 16 17 14 15 16 15 16    PROMIS TOTAL - SUBSCORES 25 27 25 26 26 26 26        Patient-reported         ASSESSMENT: Current Emotional / Mental Status (status of significant symptoms):   Risk status (Self / Other harm or suicidal ideation)   Patient denies current fears or concerns for personal safety.   Patient denies current or recent suicidal ideation or behaviors.   Patient denies current or recent homicidal ideation or behaviors.   Patient denies current or recent self injurious behavior or ideation.   Patient denies other safety concerns.   Patient reports there has been no change in risk factors since their last session.      Patient reports there has been no change in protective factors since their last session.     Recommended that patient call 911 or go to the local ED should there be a change in any of these risk factors     Appearance:   Appropriate    Eye Contact:   Good    Psychomotor Behavior: Normal    Attitude:   Cooperative    Orientation:   All   Speech    Rate / Production: Hyperverbal     Volume:  Normal    Mood:    Anxious    Affect:    Worrisome    Thought Content:  Rumination    Thought Form:  Logical    Insight:    Good      Medication Review:   No changes to current psychiatric medication(s)     Medication Compliance:   Yes     Changes in Health Issues:   None reported     Chemical Use Review:   Substance Use: Chemical use reviewed, no active concerns identified      Tobacco Use: No current tobacco use.      Diagnosis:  1. ADHD (attention deficit hyperactivity disorder), inattentive type    2. LYUBOV (generalized anxiety disorder)        Collateral Reports Completed:   Not Applicable    PLAN: (Patient Tasks / Therapist Tasks / Other)  Patient will practice focusing towards what she can control and away from what she cannot. Patient will practice using distraction to help reduce symptoms of anxiety. Patient will be mindful of how effective this is.   We will discuss next session.       Aline Jimenez, LICSW 1/30/2025    ___________________________________________________________                                              Individual Treatment Plan    Patient's Name: Tressa Jeong  YOB: 1975    Date of Creation: 7/28/2023  Date Treatment Plan Last Reviewed/Revised: 1/2/2025  DSM5 Diagnoses: Attention-Deficit/Hyperactivity Disorder  314.00 (F90.0) Predominantly inattentive presentation or 300.02 (F41.1) Generalized Anxiety Disorder  Psychosocial / Contextual Factors: Patient is currently unemployed. Patient is living with her dad part time. Patient is single.   PROMIS (reviewed every 90 days):The following  assessments were completed by patient for this visit:  PROMIS 10-Global Health (only subscores and total score):       11/20/2023     2:17 PM 1/19/2024    11:39 AM 2/29/2024    12:43 PM 4/4/2024    12:44 PM 6/20/2024    12:33 PM 10/3/2024     1:38 PM 1/2/2025     1:54 PM   PROMIS-10 Scores Only   Global Mental Health Score 9 10 11 11 10 11 10    Global Physical Health Score 16 17 14 15 16 15 16    PROMIS TOTAL - SUBSCORES 25 27 25 26 26 26 26        Patient-reported        Referral / Collaboration:  Referral to another professional/service is not indicated at this time.    Anticipated number of session for this episode of care:  50  Anticipation frequency of session: Every other week  Anticipated Duration of each session: 53 or more minutes  Treatment plan will be reviewed in 90 days or when goals have been changed. There has been demonstrated improvement in functioning while patient has been engaged in psychotherapy/psychological service- if withdrawn the patient would deteriorate and/or relapse.     MeasurableTreatment Goal(s) related to diagnosis / functional impairment(s)  Goal 1: Patient will work on stabilizing symptoms of ADHD.      Objective #A  Patient will learn about the diagnosis and symptoms of ADHD.   Status: Continued - Date(s): 1/2/2025    Intervention(s)  Therapist will teach about the diagnosis of ADHD.     Objective #B  Patient will practice setting goals and completing them.                         Status: Continued - Date(s): 1/2/2025     Intervention(s)  Therapist will teach SMART goals and how to break tasks down into smaller tasks.     Objective #C  Patient will learn 3 skills to help increase motivation and organization.   Status: Continued - Date(s): 1/2/2025    Intervention(s)  Therapist will assign homework of using coping skills to increase motivation and organizational skills.    Objective #D  Patient will learn about how ADHD can impact social interactions and interpersonal  relationships.  Status:Continued - Date(s): 1/2/2025    Intervention(s)  Therapist will teach about how ADHD can impact social interactions and interpersonal relationships.     Objective #D  Patient will create and adhere to a routine.  Status:Continued - Date(s): 1/2/2025    Intervention(s)  Therapist will assign homework to create a routine and teach about how to maintain routine.         Goal 2: Client will work on stabilizing anxiety symptoms as evidenced by LYUBOV-7 scores (current is 14) and Self report.  Goal is to reduce by three points.      I will know I've met my goal when I can better manage my anxiety .      Objective #A Client will identify triggers of anxiety and process them in session.    Status: Continued - Date(s): 1/2/2025    Intervention(s)  Therapist will teach emotional recognition/identification by assigning homework to journal with written exercises.    Objective #B  Client will identify initial signs or symptoms of anxiety.    Status: Continued - Date(s): 1/2/2025    Intervention(s)  Therapist will teach emotional regulation skills, such as deep breathing and mindfulness skills.    Objective #C  Client will learn about co-morbidities between ADHD and LYUBOV.   Status: Continued - Date(s): 1/2/2025    Intervention(s)  Therapist will provide educational materials on symptoms of ADHD and LYUBOV.        Goal 3: Patient will increase effectiveness of interpersonal communication skills.     I will know I've met my goal when I can hold my boundaries with dad.       Objective #A  Patient will increase interpersonal effectiveness skills with family and friends to help manage conflict.    Status: New - Date: 1/2/2025     Intervention(s)  Therapist will role-play conflict management.     Objective #B  Patient will build upon the authentic self.         Status:New - Date: 1/2/2025     Intervention(s)  Therapist will assign homework through written exercises.     Objective #C  Patient will bring to session  interpersonal conflicts to process and explore.   Status: New - Date: 1/2/2025     Intervention(s)  Therapist will teach about healthy boundaries related to interpersonal relationships.      Patient has reviewed and agreed to the above plan.      TYE Marie  July 28, 2023  Reviewed 10/27/2023  Re-reviewed 1/19/2024 Re-reviewed 4/4/2024  Re-reviewed 6/20/2024 Re-reviewed 10/3/2024 Re-reviewed 1/2/2025

## 2025-02-03 ENCOUNTER — TELEPHONE (OUTPATIENT)
Dept: FAMILY MEDICINE | Facility: CLINIC | Age: 50
End: 2025-02-03
Payer: COMMERCIAL

## 2025-02-03 NOTE — TELEPHONE ENCOUNTER
Patient Quality Outreach    Patient is due for the following:   Breast Cancer Screening - Mammogram    Action(s) Taken:   Schedule a Adult Preventative    Type of outreach:    Sent [employer] message.    Questions for provider review:    None           Andrea Behrend

## 2025-02-17 NOTE — TELEPHONE ENCOUNTER
Patient Quality Outreach    Patient is due for the following:   Breast Cancer Screening - Mammogram    Action(s) Taken:   Schedule a Adult Preventative    Type of outreach:    Phone, left message for patient/parent to call back.    Questions for provider review:    None           Andrea Behrend, ARRT

## 2025-02-20 ENCOUNTER — VIRTUAL VISIT (OUTPATIENT)
Dept: PSYCHOLOGY | Facility: CLINIC | Age: 50
End: 2025-02-20
Payer: COMMERCIAL

## 2025-02-20 DIAGNOSIS — F90.0 ADHD (ATTENTION DEFICIT HYPERACTIVITY DISORDER), INATTENTIVE TYPE: Primary | ICD-10-CM

## 2025-02-20 DIAGNOSIS — F41.1 GAD (GENERALIZED ANXIETY DISORDER): ICD-10-CM

## 2025-02-20 ASSESSMENT — ANXIETY QUESTIONNAIRES
4. TROUBLE RELAXING: NEARLY EVERY DAY
3. WORRYING TOO MUCH ABOUT DIFFERENT THINGS: MORE THAN HALF THE DAYS
5. BEING SO RESTLESS THAT IT IS HARD TO SIT STILL: MORE THAN HALF THE DAYS
GAD7 TOTAL SCORE: 13
IF YOU CHECKED OFF ANY PROBLEMS ON THIS QUESTIONNAIRE, HOW DIFFICULT HAVE THESE PROBLEMS MADE IT FOR YOU TO DO YOUR WORK, TAKE CARE OF THINGS AT HOME, OR GET ALONG WITH OTHER PEOPLE: VERY DIFFICULT
7. FEELING AFRAID AS IF SOMETHING AWFUL MIGHT HAPPEN: NOT AT ALL
GAD7 TOTAL SCORE: 13
2. NOT BEING ABLE TO STOP OR CONTROL WORRYING: SEVERAL DAYS
6. BECOMING EASILY ANNOYED OR IRRITABLE: MORE THAN HALF THE DAYS
1. FEELING NERVOUS, ANXIOUS, OR ON EDGE: NEARLY EVERY DAY

## 2025-02-20 NOTE — PROGRESS NOTES
[facility]                                     Progress Note    Patient Name: Tressa Jeong  Date: 2/20/2025      Service Type: Individual      Session Start Time: 2:01 PM  Session End Time: 2:55 PM     Session Length: 53+ min    Session #: 37    Attendees: Client attended alone    Service Modality:  Video Visit:      Provider verified identity through the following two step process.  Patient provided:  Patient is known previously to provider    Telemedicine Visit: The patient's condition can be safely assessed and treated via synchronous audio and visual telemedicine encounter.      Reason for Telemedicine Visit: Patient has requested telehealth visit    Originating Site (Patient Location): Patient's home    Distant Site (Provider Location): Provider Remote Setting- Home Office    Consent:  The patient/guardian has verbally consented to: the potential risks and benefits of telemedicine (video visit) versus in person care; bill my insurance or make self-payment for services provided; and responsibility for payment of non-covered services.     Patient would like the video invitation sent by:  My Chart    Mode of Communication:  Video Conference via [employer]well    Distant Location (Provider):  Off-site    As the provider I attest to compliance with applicable laws and regulations related to telemedicine.    DATA  Extended Session (53+ minutes): PROLONGED SERVICE IN THE OUTPATIENT SETTING REQUIRING DIRECT (FACE-TO-FACE) PATIENT CONTACT BEYOND THE USUAL SERVICE:    - Patient's presenting concerns require more intensive intervention than could be completed within the usual service  Interactive Complexity: No  Crisis: No        Progress Since Last Session (Related to Symptoms / Goals / Homework):   Symptoms: Worsening symptoms of anxiety based on LYUBOV-7 score and patient self-report.    Homework: Partially completed      Episode of Care Goals: Minimal progress - ACTION (Actively working towards change);  Intervened by reinforcing change plan / affirming steps taken     Current / Ongoing Stressors and Concerns:   Patient reports she is concerned about ADHD symptoms. Patient reports she is concerned about motivation. Patient reports she is concerned about working. Patient reports she is concerned about task completion. Patient reports she is concerned about prioritizing tasks. Patient reports her dad is home for the winter. Patient reports concerns about re-entering the workforce. Patient reports she is concerned about productivity. Patient reports concerns about organization. Patient reports she is job searching. Patient reports concerns about an internal conflict. Patient reports her dad went back to [place]. Patient's mom was hospitalized. Patient reports she is concerned about her sleep habits. Patient reports concerns about burn out. Patient reports concerns about the election results. Patient reports concerns about an internal conflict. Patient reports concerns about the political climate.      Treatment Objective(s) Addressed in This Session:   Patient will learn about the diagnosis and symptoms of ADHD.   Patient will learn about how ADHD can impact social interactions and interpersonal relationships.   Patient will practice setting goals and completing them.           Patient will learn 3 skills to help increase motivation and organization.  Patient will increase interpersonal effectiveness skills with family and friends to help manage conflict.    Patient will build upon the authentic self.         Patient will bring to session interpersonal conflicts to process and explore.   Patient will create and adhere to a routine.     Intervention:   Motivational Interviewing: supported patient in processing and exploring concerns about an internal conflict.   Validated patient's emotions. Supported patient in exploring boundaries she would like to set with her dad. Reviewed effective communication skills and how to  set and maintain boundaries. Supported patient in processing and exploring challenges with ADHD symptoms when her dad is home. Discussed the book A Radical Guide for Women with ADHD. Discussed reading part of the book and identifying parts she would like to work through in sessions.     Assessments completed prior to visit:  The following assessments were completed by patient for this visit:  PHQ9:       1/19/2024    11:00 AM 4/4/2024    12:00 PM 6/20/2024    12:00 PM 10/3/2024     2:00 PM 10/31/2024     2:00 PM 1/2/2025     2:00 PM 1/30/2025     2:00 PM   PHQ-9 SCORE   PHQ-9 Total Score 9 11 13 12 14 11 11     GAD7:       8/22/2024     2:00 PM 10/3/2024     2:00 PM 10/31/2024     2:00 PM 11/27/2024     1:00 PM 1/2/2025     1:54 PM 1/30/2025     2:00 PM 2/20/2025     2:00 PM   LYUBOV-7 SCORE   Total Score     14 (moderate anxiety)     Total Score 10 12 10 9 14  9 13       Patient-reported     PROMIS 10-Global Health (only subscores and total score):       11/20/2023     2:17 PM 1/19/2024    11:39 AM 2/29/2024    12:43 PM 4/4/2024    12:44 PM 6/20/2024    12:33 PM 10/3/2024     1:38 PM 1/2/2025     1:54 PM   PROMIS-10 Scores Only   Global Mental Health Score 9 10 11 11 10 11 10    Global Physical Health Score 16 17 14 15 16 15 16    PROMIS TOTAL - SUBSCORES 25 27 25 26 26 26 26        Patient-reported         ASSESSMENT: Current Emotional / Mental Status (status of significant symptoms):   Risk status (Self / Other harm or suicidal ideation)   Patient denies current fears or concerns for personal safety.   Patient denies current or recent suicidal ideation or behaviors.   Patient denies current or recent homicidal ideation or behaviors.   Patient denies current or recent self injurious behavior or ideation.   Patient denies other safety concerns.   Patient reports there has been no change in risk factors since their last session.     Patient reports there has been no change in protective factors since their last  session.     Recommended that patient call 911 or go to the local ED should there be a change in any of these risk factors     Appearance:   Appropriate    Eye Contact:   Good    Psychomotor Behavior: Normal    Attitude:   Cooperative    Orientation:   All   Speech    Rate / Production: Hyperverbal     Volume:  Normal    Mood:    Anxious    Affect:    Worrisome    Thought Content:  Rumination    Thought Form:  Logical    Insight:    Good      Medication Review:   No changes to current psychiatric medication(s)     Medication Compliance:   Yes     Changes in Health Issues:   None reported     Chemical Use Review:   Substance Use: Chemical use reviewed, no active concerns identified      Tobacco Use: No current tobacco use.      Diagnosis:  1. ADHD (attention deficit hyperactivity disorder), inattentive type    2. LYUBOV (generalized anxiety disorder)        Collateral Reports Completed:   Not Applicable    PLAN: (Patient Tasks / Therapist Tasks / Other)  Patient will read part of A Radical Guide for Women with ADHD. Patient will identify topics from the book she would like to talk about in therapy. Patient will use effective communication skills to set boundaries with her dad.   We will discuss next session.       Aline Jimenez, [facility] 2/20/2025    ___________________________________________________________                                              Individual Treatment Plan    Patient's Name: Tressa Jeong  YOB: 1975    Date of Creation: 7/28/2023  Date Treatment Plan Last Reviewed/Revised: 1/2/2025  DSM5 Diagnoses: Attention-Deficit/Hyperactivity Disorder  314.00 (F90.0) Predominantly inattentive presentation or 300.02 (F41.1) Generalized Anxiety Disorder  Psychosocial / Contextual Factors: Patient is currently unemployed. Patient is living with her dad part time. Patient is single.   PROMIS (reviewed every 90 days):The following assessments were completed by patient for this visit:  PROMIS 10-Global  Health (only subscores and total score):       11/20/2023     2:17 PM 1/19/2024    11:39 AM 2/29/2024    12:43 PM 4/4/2024    12:44 PM 6/20/2024    12:33 PM 10/3/2024     1:38 PM 1/2/2025     1:54 PM   PROMIS-10 Scores Only   Global Mental Health Score 9 10 11 11 10 11 10    Global Physical Health Score 16 17 14 15 16 15 16    PROMIS TOTAL - SUBSCORES 25 27 25 26 26 26 26        Patient-reported        Referral / Collaboration:  Referral to another professional/service is not indicated at this time.    Anticipated number of session for this episode of care:  50  Anticipation frequency of session: Every other week  Anticipated Duration of each session: 53 or more minutes  Treatment plan will be reviewed in 90 days or when goals have been changed. There has been demonstrated improvement in functioning while patient has been engaged in psychotherapy/psychological service- if withdrawn the patient would deteriorate and/or relapse.     MeasurableTreatment Goal(s) related to diagnosis / functional impairment(s)  Goal 1: Patient will work on stabilizing symptoms of ADHD.      Objective #A  Patient will learn about the diagnosis and symptoms of ADHD.   Status: Continued - Date(s): 1/2/2025    Intervention(s)  Therapist will teach about the diagnosis of ADHD.     Objective #B  Patient will practice setting goals and completing them.                         Status: Continued - Date(s): 1/2/2025     Intervention(s)  Therapist will teach SMART goals and how to break tasks down into smaller tasks.     Objective #C  Patient will learn 3 skills to help increase motivation and organization.   Status: Continued - Date(s): 1/2/2025    Intervention(s)  Therapist will assign homework of using coping skills to increase motivation and organizational skills.    Objective #D  Patient will learn about how ADHD can impact social interactions and interpersonal relationships.  Status:Continued - Date(s): 1/2/2025    Intervention(s)  Therapist  will teach about how ADHD can impact social interactions and interpersonal relationships.     Objective #D  Patient will create and adhere to a routine.  Status:Continued - Date(s): 1/2/2025    Intervention(s)  Therapist will assign homework to create a routine and teach about how to maintain routine.         Goal 2: Client will work on stabilizing anxiety symptoms as evidenced by LYUBOV-7 scores (current is 14) and Self report.  Goal is to reduce by three points.      I will know I've met my goal when I can better manage my anxiety .      Objective #A Client will identify triggers of anxiety and process them in session.    Status: Continued - Date(s): 1/2/2025    Intervention(s)  Therapist will teach emotional recognition/identification by assigning homework to journal with written exercises.    Objective #B  Client will identify initial signs or symptoms of anxiety.    Status: Continued - Date(s): 1/2/2025    Intervention(s)  Therapist will teach emotional regulation skills, such as deep breathing and mindfulness skills.    Objective #C  Client will learn about co-morbidities between ADHD and LYUBOV.   Status: Continued - Date(s): 1/2/2025    Intervention(s)  Therapist will provide educational materials on symptoms of ADHD and LYUBOV.        Goal 3: Patient will increase effectiveness of interpersonal communication skills.     I will know I've met my goal when I can hold my boundaries with dad.       Objective #A  Patient will increase interpersonal effectiveness skills with family and friends to help manage conflict.    Status: New - Date: 1/2/2025     Intervention(s)  Therapist will role-play conflict management.     Objective #B  Patient will build upon the authentic self.         Status:New - Date: 1/2/2025     Intervention(s)  Therapist will assign homework through written exercises.     Objective #C  Patient will bring to session interpersonal conflicts to process and explore.   Status: New - Date: 1/2/2025      Intervention(s)  Therapist will teach about healthy boundaries related to interpersonal relationships.      Patient has reviewed and agreed to the above plan.      TYE Marie  July 28, 2023  Reviewed 10/27/2023  Re-reviewed 1/19/2024 Re-reviewed 4/4/2024  Re-reviewed 6/20/2024 Re-reviewed 10/3/2024 Re-reviewed 1/2/2025

## 2025-02-25 ENCOUNTER — OFFICE VISIT (OUTPATIENT)
Dept: SLEEP MEDICINE | Facility: CLINIC | Age: 50
End: 2025-02-25
Payer: COMMERCIAL

## 2025-02-25 VITALS
HEART RATE: 96 BPM | OXYGEN SATURATION: 98 % | SYSTOLIC BLOOD PRESSURE: 122 MMHG | HEIGHT: 65 IN | DIASTOLIC BLOOD PRESSURE: 83 MMHG | BODY MASS INDEX: 32.82 KG/M2 | WEIGHT: 197 LBS

## 2025-02-25 DIAGNOSIS — R06.83 SNORING: ICD-10-CM

## 2025-02-25 DIAGNOSIS — G47.33 OSA (OBSTRUCTIVE SLEEP APNEA): Primary | ICD-10-CM

## 2025-02-25 DIAGNOSIS — G47.21 CIRCADIAN RHYTHM SLEEP DISORDER, DELAYED SLEEP PHASE TYPE: ICD-10-CM

## 2025-02-25 PROBLEM — F33.2 MAJOR DEPRESSIVE DISORDER, RECURRENT EPISODE, SEVERE (H): Chronic | Status: ACTIVE | Noted: 2025-02-25

## 2025-02-25 PROBLEM — F33.2 MAJOR DEPRESSIVE DISORDER, RECURRENT EPISODE, SEVERE (H): Status: ACTIVE | Noted: 2025-02-25

## 2025-02-25 PROCEDURE — 1126F AMNT PAIN NOTED NONE PRSNT: CPT | Performed by: INTERNAL MEDICINE

## 2025-02-25 PROCEDURE — 99214 OFFICE O/P EST MOD 30 MIN: CPT | Performed by: INTERNAL MEDICINE

## 2025-02-25 PROCEDURE — 3074F SYST BP LT 130 MM HG: CPT | Performed by: INTERNAL MEDICINE

## 2025-02-25 PROCEDURE — 3079F DIAST BP 80-89 MM HG: CPT | Performed by: INTERNAL MEDICINE

## 2025-02-25 RX ORDER — SERTRALINE HYDROCHLORIDE 25 MG/1
125 TABLET, FILM COATED ORAL DAILY
COMMUNITY
Start: 2025-02-22

## 2025-02-25 ASSESSMENT — SLEEP AND FATIGUE QUESTIONNAIRES
HOW LIKELY ARE YOU TO NOD OFF OR FALL ASLEEP IN A CAR, WHILE STOPPED FOR A FEW MINUTES IN TRAFFIC: WOULD NEVER DOZE
HOW LIKELY ARE YOU TO NOD OFF OR FALL ASLEEP WHILE SITTING AND TALKING TO SOMEONE: WOULD NEVER DOZE
HOW LIKELY ARE YOU TO NOD OFF OR FALL ASLEEP WHILE WATCHING TV: MODERATE CHANCE OF DOZING
HOW LIKELY ARE YOU TO NOD OFF OR FALL ASLEEP WHEN YOU ARE A PASSENGER IN A CAR FOR AN HOUR WITHOUT A BREAK: SLIGHT CHANCE OF DOZING
HOW LIKELY ARE YOU TO NOD OFF OR FALL ASLEEP WHILE LYING DOWN TO REST IN THE AFTERNOON WHEN CIRCUMSTANCES PERMIT: MODERATE CHANCE OF DOZING
HOW LIKELY ARE YOU TO NOD OFF OR FALL ASLEEP WHILE SITTING AND READING: MODERATE CHANCE OF DOZING
HOW LIKELY ARE YOU TO NOD OFF OR FALL ASLEEP WHILE SITTING QUIETLY AFTER LUNCH WITHOUT ALCOHOL: WOULD NEVER DOZE
HOW LIKELY ARE YOU TO NOD OFF OR FALL ASLEEP WHILE SITTING INACTIVE IN A PUBLIC PLACE: WOULD NEVER DOZE

## 2025-02-25 NOTE — PROGRESS NOTES
[facility] [address] [address]  Phone: [phone]    Patient:  Tressa Jeong, Date of birth 1975  Date of Visit:  02/25/2025  Referring Provider Angelina Tam                  Assessment and Plan:   Tressa Jeong is a 49 year old female with recent diagnosis of attention deficit hyperactivity disorder on lisdexamfetamine, history of major depression on sertraline and bupropion was diagnosed with mild positional obstructive sleep apnea.  Alternative therapies including mandibular advancement device and positional belt was discussed.  Tressa had recent teeth implants and is not a candidate for mandibular advancement device at this time.  She has been using positional belt but complains of discomfort and at least and would like to start CPAP therapy   Sleep disordered breathing.  Mild, positional obstructive sleep apnea.  She has symptomatic improvement with positional belt but it does interferes with her sleep due to discomfort.  At present she is not a candidate for mandibular advancement device.  The underlying obstructive sleep apnea with a positional and REM sleep related component does interfere with the quality of her sleep and triggers frequent nighttime arousals and daytime symptoms.    Circadian rhythm-delayed sleep phase disorder.  She has been taking 300 mcg of melatonin at 10 PM which has advanced her sleep phase and more recently sleeping around 1 AM and she is usually waking up at 11 AM.    Inadequate sleep hygiene.  She has been following recommendation regarding going to bed no earlier than 1 AM but on occasion has been sleeping later into the night.  She also has not been very careful regarding light exposure prior to 9 AM.        Comorbid Diagnoses:     Attention deficit hyperactivity disorder, currently taking lisdexamfetamine.     Summary Recommendations:     Start treatment with auto titrating CPAP 5-15 cm of water.  A  prescription of new PAP device, PAP supplies has been provided along with request for a mask fit and PAP education.  Sleep hygiene was discussed and recommended again.  She should maintain consistent bed and wake time.  Continue to take 300 mcg of melatonin at 10 PM with intention to sleep at 1 AM.  She should wake up no later than 11 AM.  On occasions when she was wakes up before 9 AM she should avoid ambient light exposure by using dark glasses or sleeping without covers.    Summary Counseling:  Check out [email or web address]           History of Present Illness:     Tressa Jeong is a 49 year old female with above-mentioned medical history was using a positional belt for her mild underlying positional obstructive sleep apnea.  She has noticed improvement in her overall sleep earlier in the therapy but now is uncomfortable wearing the belt.  She has undergoing teeth implantation  and currently is not a candidate for mandibular advancement device.  She has underlying mood disorder which is being treated with sertraline and bupropion and also takes stimulant for attention deficit hyperactivity disorder.  She is not content with her sleep and would like to pursue CPAP therapy.        PREVIOUS SLEEP STUDIES:  Date: 01/22/2024  AHI: 5.5/hour, supine AHI 12.4/hour.  Sleep Architecture: Poor sleep efficiency - 66.6%, Sleep Latency 130.5 minutes, WASO 49 minutes.    Sleep wake schedule:   Bedtime 1 AM  Awakening 11 AM  not using alarm   Nonworkday bedtime 1-2 AM Awakening 11 AM - 12 PM   0-1 Awakenings for 2-5 minutes per night/week  Naps: None     She does feel rested in the morning.    7Epworth Sleepiness Scale: 7/24    SENDY Total Score: (Patient-Rptd) 16         Medications:     Current Outpatient Medications   Medication Sig Dispense Refill    amphetamine-dextroamphetamine (ADDERALL) 15 MG tablet Take 20 mg by mouth daily      betamethasone valerate (VALISONE) 0.1 % external ointment Apply topically 2 times  "daily Apply twice daily as needed for bug bites. 45 g 2    buPROPion (WELLBUTRIN XL) 150 MG 24 hr tablet Take 1 tablet every day by oral route.*      clonazePAM (KLONOPIN) 0.5 MG tablet Take 0.5 mg by mouth daily as needed      fluocinonide (LIDEX) 0.05 % external ointment Apply twice daily as needed for bug bites on trunk or extremities 60 g 2    sertraline (ZOLOFT) 50 MG tablet Take 50 mg by mouth daily      SF 5000 PLUS 1.1 % CREA BRUSH WITH A SMALL AMOUNT 2 TIMES PER DAY. DO NOT SWALLOW. DO NOT EAT, DRINK OR RINSE FOR 30 MINUTES AFTERWARD.*       No current facility-administered medications for this visit.        Allergies   Allergen Reactions    Latex     Morphine      Not life treated, burning and painful when administ            Past Medical History:     Does not need 02 supplement at night   Past Medical History:   Diagnosis Date    Depressive disorder              Past Surgical History:    No h/o  upper airway surgery  Past Surgical History:   Procedure Laterality Date    COLONOSCOPY N/A 9/8/2023    Procedure: COLONOSCOPY, WITH POLYPECTOMY;  Surgeon: Mk Garay MD;  Location: [facility] OR    ORTHOPEDIC SURGERY              Physical Examination:     Objective   Vitals: /83   Pulse 96   Ht 1.651 m (5' 5\")   Wt 89.4 kg (197 lb)   SpO2 98%   BMI 32.78 kg/m     General: healthy, alert, and no distress  Psych: Alert and oriented times 3; coherent speech, normal   rate and volume, able to articulate logical thoughts, able   to abstract reason, no tangential thoughts, no hallucinations   or delusions  His affect is normal    Copy to: Yi [place]    Total of 30 minutes of time was spent with patient, this included the interview and exam, and review of the chart/labs/imaging/sleep study/PAP therapy data on 02/25/2025. Greater than 50% of which was spent counseling and coordinating care.   Yonatan Alfaro MD 2/25/2025     [facility] " Professional Building   [address]oor [address], [address]   [address] [place] [address]   Appointments: [phone]

## 2025-02-25 NOTE — NURSING NOTE
"Chief Complaint   Patient presents with    Results     CPAP establishment.       Initial /83   Pulse 96   Ht 1.651 m (5' 5\")   Wt 89.4 kg (197 lb)   SpO2 98%   BMI 32.78 kg/m   Estimated body mass index is 32.78 kg/m  as calculated from the following:    Height as of this encounter: 1.651 m (5' 5\").    Weight as of this encounter: 89.4 kg (197 lb).    Medication Reconciliation: complete    Neck circumference: 38 centimeters.    Cherelle Pedersen on 2/25/2025      "

## 2025-03-06 ENCOUNTER — VIRTUAL VISIT (OUTPATIENT)
Dept: PSYCHOLOGY | Facility: CLINIC | Age: 50
End: 2025-03-06
Payer: COMMERCIAL

## 2025-03-06 ENCOUNTER — DOCUMENTATION ONLY (OUTPATIENT)
Dept: SLEEP MEDICINE | Facility: CLINIC | Age: 50
End: 2025-03-06
Payer: COMMERCIAL

## 2025-03-06 DIAGNOSIS — G47.33 OBSTRUCTIVE SLEEP APNEA (ADULT) (PEDIATRIC): Primary | ICD-10-CM

## 2025-03-06 DIAGNOSIS — F90.0 ADHD (ATTENTION DEFICIT HYPERACTIVITY DISORDER), INATTENTIVE TYPE: Primary | ICD-10-CM

## 2025-03-06 DIAGNOSIS — F41.1 GAD (GENERALIZED ANXIETY DISORDER): ICD-10-CM

## 2025-03-06 DIAGNOSIS — F33.9 RECURRENT MAJOR DEPRESSION: ICD-10-CM

## 2025-03-06 NOTE — PROGRESS NOTES
Patient was offered choice of vendor and chose [facility].  Patient Tressa Jeong was set up at [facility] on March 6, 2025. Patient received a Resmed Airsense 11 Pressures were set at  5-15 cm H2O.   Patient s ramp is 5 cm H2O for Auto and FLEX/EPR is EPR, 2.  Patient received a Resmed Mask name: [id number]  Full Face mask size For Her, Small, heated tubing and heated humidifier.  Patient has the following compliance requirements: usage only  Patient has a follow up on 8/5/25 with DR. SANCHEZ, JONEL Van

## 2025-03-06 NOTE — PROGRESS NOTES
[facility]                                     Progress Note    Patient Name: Tressa Jeong  Date: 3/6/2025      Service Type: Individual      Session Start Time: 2:01 PM  Session End Time: 2:55 PM     Session Length: 53+ min    Session #: 38    Attendees: Client attended alone    Service Modality:  Video Visit:      Provider verified identity through the following two step process.  Patient provided:  Patient is known previously to provider    Telemedicine Visit: The patient's condition can be safely assessed and treated via synchronous audio and visual telemedicine encounter.      Reason for Telemedicine Visit: Patient has requested telehealth visit    Originating Site (Patient Location): Patient's home    Distant Site (Provider Location): Provider Remote Setting- Home Office    Consent:  The patient/guardian has verbally consented to: the potential risks and benefits of telemedicine (video visit) versus in person care; bill my insurance or make self-payment for services provided; and responsibility for payment of non-covered services.     Patient would like the video invitation sent by:  My Chart    Mode of Communication:  Video Conference via [employer]    Distant Location (Provider):  Off-site    As the provider I attest to compliance with applicable laws and regulations related to telemedicine.    DATA  Extended Session (53+ minutes): PROLONGED SERVICE IN THE OUTPATIENT SETTING REQUIRING DIRECT (FACE-TO-FACE) PATIENT CONTACT BEYOND THE USUAL SERVICE:    - Patient's presenting concerns require more intensive intervention than could be completed within the usual service  Interactive Complexity: No  Crisis: No        Progress Since Last Session (Related to Symptoms / Goals / Homework):   Symptoms: No change based on patient self-report.     Homework: Partially completed      Episode of Care Goals: Minimal progress - ACTION (Actively working towards change); Intervened by reinforcing change  plan / affirming steps taken     Current / Ongoing Stressors and Concerns:   Patient reports she is concerned about ADHD symptoms. Patient reports she is concerned about motivation. Patient reports she is concerned about working. Patient reports she is concerned about task completion. Patient reports she is concerned about prioritizing tasks. Patient reports her dad is home for the winter. Patient reports concerns about re-entering the workforce. Patient reports she is concerned about productivity. Patient reports concerns about organization. Patient reports she is job searching. Patient reports concerns about an internal conflict. Patient reports her dad went back to [place]. Patient's mom was hospitalized. Patient reports she is concerned about her sleep habits. Patient reports concerns about burn out. Patient reports concerns about the election results. Patient reports concerns about an internal conflict. Patient reports concerns about the political climate.      Treatment Objective(s) Addressed in This Session:   Patient will learn about the diagnosis and symptoms of ADHD.   Patient will learn about how ADHD can impact social interactions and interpersonal relationships.   Patient will practice setting goals and completing them.           Patient will learn 3 skills to help increase motivation and organization.  Patient will increase interpersonal effectiveness skills with family and friends to help manage conflict.    Patient will build upon the authentic self.         Patient will bring to session interpersonal conflicts to process and explore.   Patient will create and adhere to a routine.     Intervention:   Motivational Interviewing: Supported patient in processing and exploring her reading of A Radical Guide for Women with ADHD.   Validated patient's emotions. Discussed how the diagnosis of ADHD can present differently in men and women. Supported patient in exploring some of the social ways that her ADHD  presents and impacts her interpersonal relationships. Supported patient in exploring the emotion of shame. Discussed continuing to read the book and take notes on things that resonate with her symptoms of ADHD.      Assessments completed prior to visit:  The following assessments were completed by patient for this visit:  PHQ9:       1/19/2024    11:00 AM 4/4/2024    12:00 PM 6/20/2024    12:00 PM 10/3/2024     2:00 PM 10/31/2024     2:00 PM 1/2/2025     2:00 PM 1/30/2025     2:00 PM   PHQ-9 SCORE   PHQ-9 Total Score 9 11 13 12 14 11 11     GAD7:       8/22/2024     2:00 PM 10/3/2024     2:00 PM 10/31/2024     2:00 PM 11/27/2024     1:00 PM 1/2/2025     1:54 PM 1/30/2025     2:00 PM 2/20/2025     2:00 PM   LYUBOV-7 SCORE   Total Score     14 (moderate anxiety)     Total Score 10 12 10 9 14  9 13       Patient-reported     PROMIS 10-Global Health (only subscores and total score):       11/20/2023     2:17 PM 1/19/2024    11:39 AM 2/29/2024    12:43 PM 4/4/2024    12:44 PM 6/20/2024    12:33 PM 10/3/2024     1:38 PM 1/2/2025     1:54 PM   PROMIS-10 Scores Only   Global Mental Health Score 9 10 11 11 10 11 10    Global Physical Health Score 16 17 14 15 16 15 16    PROMIS TOTAL - SUBSCORES 25 27 25 26 26 26 26        Patient-reported         ASSESSMENT: Current Emotional / Mental Status (status of significant symptoms):   Risk status (Self / Other harm or suicidal ideation)   Patient denies current fears or concerns for personal safety.   Patient denies current or recent suicidal ideation or behaviors.   Patient denies current or recent homicidal ideation or behaviors.   Patient denies current or recent self injurious behavior or ideation.   Patient denies other safety concerns.   Patient reports there has been no change in risk factors since their last session.     Patient reports there has been no change in protective factors since their last session.     Recommended that patient call 911 or go to the local ED should  there be a change in any of these risk factors     Appearance:   Appropriate    Eye Contact:   Good    Psychomotor Behavior: Normal    Attitude:   Cooperative    Orientation:   All   Speech    Rate / Production: Hyperverbal     Volume:  Normal    Mood:    Anxious    Affect:    Worrisome    Thought Content:  Rumination    Thought Form:  Logical    Insight:    Good      Medication Review:   No changes to current psychiatric medication(s)     Medication Compliance:   Yes     Changes in Health Issues:   None reported     Chemical Use Review:   Substance Use: Chemical use reviewed, no active concerns identified      Tobacco Use: No current tobacco use.      Diagnosis:  1. ADHD (attention deficit hyperactivity disorder), inattentive type    2. LYUBOV (generalized anxiety disorder)        Collateral Reports Completed:   Not Applicable    PLAN: (Patient Tasks / Therapist Tasks / Other)  Patient will continue reading A Radical Guide for Women with ADHD. Patient will keep notes on what resonates with her symptoms of ADHD.  We will discuss next session.       Aline Jimenez, [facility] Health CareSW 3/6/2025    ___________________________________________________________                                              Individual Treatment Plan    Patient's Name: Tressa Jeong  YOB: 1975    Date of Creation: 7/28/2023  Date Treatment Plan Last Reviewed/Revised: 1/2/2025  DSM5 Diagnoses: Attention-Deficit/Hyperactivity Disorder  314.00 (F90.0) Predominantly inattentive presentation or 300.02 (F41.1) Generalized Anxiety Disorder  Psychosocial / Contextual Factors: Patient is currently unemployed. Patient is living with her dad part time. Patient is single.   PROMIS (reviewed every 90 days):The following assessments were completed by patient for this visit:  PROMIS 10-Global Health (only subscores and total score):       11/20/2023     2:17 PM 1/19/2024    11:39 AM 2/29/2024    12:43 PM 4/4/2024    12:44 PM 6/20/2024    12:33 PM 10/3/2024      1:38 PM 1/2/2025     1:54 PM   PROMIS-10 Scores Only   Global Mental Health Score 9 10 11 11 10 11 10    Global Physical Health Score 16 17 14 15 16 15 16    PROMIS TOTAL - SUBSCORES 25 27 25 26 26 26 26        Patient-reported        Referral / Collaboration:  Referral to another professional/service is not indicated at this time.    Anticipated number of session for this episode of care:  50  Anticipation frequency of session: Every other week  Anticipated Duration of each session: 53 or more minutes  Treatment plan will be reviewed in 90 days or when goals have been changed. There has been demonstrated improvement in functioning while patient has been engaged in psychotherapy/psychological service- if withdrawn the patient would deteriorate and/or relapse.     MeasurableTreatment Goal(s) related to diagnosis / functional impairment(s)  Goal 1: Patient will work on stabilizing symptoms of ADHD.      Objective #A  Patient will learn about the diagnosis and symptoms of ADHD.   Status: Continued - Date(s): 1/2/2025    Intervention(s)  Therapist will teach about the diagnosis of ADHD.     Objective #B  Patient will practice setting goals and completing them.                         Status: Continued - Date(s): 1/2/2025     Intervention(s)  Therapist will teach SMART goals and how to break tasks down into smaller tasks.     Objective #C  Patient will learn 3 skills to help increase motivation and organization.   Status: Continued - Date(s): 1/2/2025    Intervention(s)  Therapist will assign homework of using coping skills to increase motivation and organizational skills.    Objective #D  Patient will learn about how ADHD can impact social interactions and interpersonal relationships.  Status:Continued - Date(s): 1/2/2025    Intervention(s)  Therapist will teach about how ADHD can impact social interactions and interpersonal relationships.     Objective #D  Patient will create and adhere to a routine.  Status:Continued  - Date(s): 1/2/2025    Intervention(s)  Therapist will assign homework to create a routine and teach about how to maintain routine.         Goal 2: Client will work on stabilizing anxiety symptoms as evidenced by LYUBOV-7 scores (current is 14) and Self report.  Goal is to reduce by three points.      I will know I've met my goal when I can better manage my anxiety .      Objective #A Client will identify triggers of anxiety and process them in session.    Status: Continued - Date(s): 1/2/2025    Intervention(s)  Therapist will teach emotional recognition/identification by assigning homework to journal with written exercises.    Objective #B  Client will identify initial signs or symptoms of anxiety.    Status: Continued - Date(s): 1/2/2025    Intervention(s)  Therapist will teach emotional regulation skills, such as deep breathing and mindfulness skills.    Objective #C  Client will learn about co-morbidities between ADHD and LYUBOV.   Status: Continued - Date(s): 1/2/2025    Intervention(s)  Therapist will provide educational materials on symptoms of ADHD and LYUBOV.        Goal 3: Patient will increase effectiveness of interpersonal communication skills.     I will know I've met my goal when I can hold my boundaries with dad.       Objective #A  Patient will increase interpersonal effectiveness skills with family and friends to help manage conflict.    Status: New - Date: 1/2/2025     Intervention(s)  Therapist will role-play conflict management.     Objective #B  Patient will build upon the authentic self.         Status:New - Date: 1/2/2025     Intervention(s)  Therapist will assign homework through written exercises.     Objective #C  Patient will bring to session interpersonal conflicts to process and explore.   Status: New - Date: 1/2/2025     Intervention(s)  Therapist will teach about healthy boundaries related to interpersonal relationships.      Patient has reviewed and agreed to the above plan.      Aline Jimenez,  LICSW  July 28, 2023  Reviewed 10/27/2023  Re-reviewed 1/19/2024 Re-reviewed 4/4/2024  Re-reviewed 6/20/2024 Re-reviewed 10/3/2024 Re-reviewed 1/2/2025

## 2025-03-10 ENCOUNTER — DOCUMENTATION ONLY (OUTPATIENT)
Dept: SLEEP MEDICINE | Facility: CLINIC | Age: 50
End: 2025-03-10
Payer: COMMERCIAL

## 2025-03-10 NOTE — PROGRESS NOTES
3 day Sleep therapy management telephone visit    Diagnostic AHI:PS.1         Confirmed with patient at time of call- N/A Patient is still interested in STM service       Message left for patient to return call        Objective data     Order Settings for PAP  CPAP min     CPAP max     CPAP fixed         Device settings from machine CPAP min 5.0     CPAP max 15.0      CPAP fixed      EPR Setting TWO    RESMED soft response  OFF     Assessment: Nighty usage most nights over four hours      Patient has the following upcoming sleep appts:  Future Sleep Appointments         Provider Department    2025 3:30 PM (Arrive by 3:15 PM) Angelina Tam MD [facility] [place]            Replacement device: No  STM ordered by provider: Yes     Total time spent on accessing and  interpreting remote patient PAP therapy data  10 minutes    Total time spent counseling, coaching  and reviewing PAP therapy data with patient  1 minutes    [id number] no

## 2025-03-20 ENCOUNTER — VIRTUAL VISIT (OUTPATIENT)
Dept: PSYCHOLOGY | Facility: CLINIC | Age: 50
End: 2025-03-20
Payer: COMMERCIAL

## 2025-03-20 DIAGNOSIS — F41.1 GAD (GENERALIZED ANXIETY DISORDER): ICD-10-CM

## 2025-03-20 DIAGNOSIS — F90.0 ADHD (ATTENTION DEFICIT HYPERACTIVITY DISORDER), INATTENTIVE TYPE: Primary | ICD-10-CM

## 2025-03-20 NOTE — PROGRESS NOTES
[facility]                                     Progress Note    Patient Name: Tressa Jeong  Date: 3/20/2025      Service Type: Individual      Session Start Time: 2:00 PM  Session End Time: 2:55 PM     Session Length: 53+ min    Session #: 39    Attendees: Client attended alone    Service Modality:  Video Visit:      Provider verified identity through the following two step process.  Patient provided:  Patient is known previously to provider    Telemedicine Visit: The patient's condition can be safely assessed and treated via synchronous audio and visual telemedicine encounter.      Reason for Telemedicine Visit: Patient has requested telehealth visit    Originating Site (Patient Location): Patient's home    Distant Site (Provider Location): Provider Remote Setting- Home Office    Consent:  The patient/guardian has verbally consented to: the potential risks and benefits of telemedicine (video visit) versus in person care; bill my insurance or make self-payment for services provided; and responsibility for payment of non-covered services.     Patient would like the video invitation sent by:  My Chart    Mode of Communication:  Video Conference via [employer]    Distant Location (Provider):  Off-site    As the provider I attest to compliance with applicable laws and regulations related to telemedicine.    DATA  Extended Session (53+ minutes): PROLONGED SERVICE IN THE OUTPATIENT SETTING REQUIRING DIRECT (FACE-TO-FACE) PATIENT CONTACT BEYOND THE USUAL SERVICE:    - Patient's presenting concerns require more intensive intervention than could be completed within the usual service  Interactive Complexity: No  Crisis: No        Progress Since Last Session (Related to Symptoms / Goals / Homework):   Symptoms: No change based on patient self-report.     Homework: Partially completed      Episode of Care Goals: Minimal progress - ACTION (Actively working towards change); Intervened by reinforcing change  plan / affirming steps taken     Current / Ongoing Stressors and Concerns:   Patient reports she is concerned about ADHD symptoms. Patient reports she is concerned about motivation. Patient reports she is concerned about working. Patient reports she is concerned about task completion. Patient reports she is concerned about prioritizing tasks. Patient reports her dad is home for the winter. Patient reports concerns about re-entering the workforce. Patient reports she is concerned about productivity. Patient reports concerns about organization. Patient reports she is job searching. Patient reports concerns about an internal conflict. Patient reports her dad went back to [place]. Patient's mom was hospitalized. Patient reports she is concerned about her sleep habits. Patient reports concerns about burn out. Patient reports concerns about the election results. Patient reports concerns about an internal conflict. Patient reports concerns about the political climate.      Treatment Objective(s) Addressed in This Session:   Patient will learn about the diagnosis and symptoms of ADHD.   Patient will learn about how ADHD can impact social interactions and interpersonal relationships.   Patient will practice setting goals and completing them.           Patient will learn 3 skills to help increase motivation and organization.  Patient will increase interpersonal effectiveness skills with family and friends to help manage conflict.    Patient will build upon the authentic self.         Patient will bring to session interpersonal conflicts to process and explore.   Patient will create and adhere to a routine.     Intervention:   Motivational Interviewing: Supported patient in processing and exploring her reading of A Radical Guide for Women with ADHD.   Validated patient's emotions. Supported patient in exploring how negative self-talk messages were present in growing up without a formal diagnosis. Supported patient in exploring  "how being diagnosed as an adult impacted her. Discussed continuing to read the book and journaling the short answers to the section in the book titled \" moving closer to wholeness\".     Assessments completed prior to visit:  The following assessments were completed by patient for this visit:  PHQ9:       1/19/2024    11:00 AM 4/4/2024    12:00 PM 6/20/2024    12:00 PM 10/3/2024     2:00 PM 10/31/2024     2:00 PM 1/2/2025     2:00 PM 1/30/2025     2:00 PM   PHQ-9 SCORE   PHQ-9 Total Score 9 11 13 12 14 11 11     GAD7:       8/22/2024     2:00 PM 10/3/2024     2:00 PM 10/31/2024     2:00 PM 11/27/2024     1:00 PM 1/2/2025     1:54 PM 1/30/2025     2:00 PM 2/20/2025     2:00 PM   LYUBOV-7 SCORE   Total Score     14 (moderate anxiety)     Total Score 10 12 10 9 14  9 13       Patient-reported     PROMIS 10-Global Health (only subscores and total score):       11/20/2023     2:17 PM 1/19/2024    11:39 AM 2/29/2024    12:43 PM 4/4/2024    12:44 PM 6/20/2024    12:33 PM 10/3/2024     1:38 PM 1/2/2025     1:54 PM   PROMIS-10 Scores Only   Global Mental Health Score 9 10 11 11 10 11 10    Global Physical Health Score 16 17 14 15 16 15 16    PROMIS TOTAL - SUBSCORES 25 27 25 26 26 26 26        Patient-reported         ASSESSMENT: Current Emotional / Mental Status (status of significant symptoms):   Risk status (Self / Other harm or suicidal ideation)   Patient denies current fears or concerns for personal safety.   Patient denies current or recent suicidal ideation or behaviors.   Patient denies current or recent homicidal ideation or behaviors.   Patient denies current or recent self injurious behavior or ideation.   Patient denies other safety concerns.   Patient reports there has been no change in risk factors since their last session.     Patient reports there has been no change in protective factors since their last session.     Recommended that patient call 911 or go to the local ED should there be a change in any of these " "risk factors     Appearance:   Appropriate    Eye Contact:   Good    Psychomotor Behavior: Normal    Attitude:   Cooperative    Orientation:   All   Speech    Rate / Production: Hyperverbal     Volume:  Normal    Mood:    Anxious    Affect:    Worrisome    Thought Content:  Rumination    Thought Form:  Logical    Insight:    Good      Medication Review:   No changes to current psychiatric medication(s)     Medication Compliance:   Yes     Changes in Health Issues:   None reported     Chemical Use Review:   Substance Use: Chemical use reviewed, no active concerns identified      Tobacco Use: No current tobacco use.      Diagnosis:  1. ADHD (attention deficit hyperactivity disorder), inattentive type    2. LYUBOV (generalized anxiety disorder)        Collateral Reports Completed:   Not Applicable    PLAN: (Patient Tasks / Therapist Tasks / Other)  Patient will continue reading A Radical Guide for Women with ADHD. Patient will journal responses to \"moving closer to wholeness\" in this book.   We will discuss next session.       Aline Jimenez, LICSW 3/20/2025    ___________________________________________________________                                              Individual Treatment Plan    Patient's Name: Tressa Jeong  YOB: 1975    Date of Creation: 7/28/2023  Date Treatment Plan Last Reviewed/Revised: 1/2/2025  DSM5 Diagnoses: Attention-Deficit/Hyperactivity Disorder  314.00 (F90.0) Predominantly inattentive presentation or 300.02 (F41.1) Generalized Anxiety Disorder  Psychosocial / Contextual Factors: Patient is currently unemployed. Patient is living with her dad part time. Patient is single.   PROMIS (reviewed every 90 days):The following assessments were completed by patient for this visit:  PROMIS 10-Global Health (only subscores and total score):       11/20/2023     2:17 PM 1/19/2024    11:39 AM 2/29/2024    12:43 PM 4/4/2024    12:44 PM 6/20/2024    12:33 PM 10/3/2024     1:38 PM 1/2/2025     1:54 " PM   PROMIS-10 Scores Only   Global Mental Health Score 9 10 11 11 10 11 10    Global Physical Health Score 16 17 14 15 16 15 16    PROMIS TOTAL - SUBSCORES 25 27 25 26 26 26 26        Patient-reported        Referral / Collaboration:  Referral to another professional/service is not indicated at this time.    Anticipated number of session for this episode of care:  50  Anticipation frequency of session: Every other week  Anticipated Duration of each session: 53 or more minutes  Treatment plan will be reviewed in 90 days or when goals have been changed. There has been demonstrated improvement in functioning while patient has been engaged in psychotherapy/psychological service- if withdrawn the patient would deteriorate and/or relapse.     MeasurableTreatment Goal(s) related to diagnosis / functional impairment(s)  Goal 1: Patient will work on stabilizing symptoms of ADHD.      Objective #A  Patient will learn about the diagnosis and symptoms of ADHD.   Status: Continued - Date(s): 1/2/2025    Intervention(s)  Therapist will teach about the diagnosis of ADHD.     Objective #B  Patient will practice setting goals and completing them.                         Status: Continued - Date(s): 1/2/2025     Intervention(s)  Therapist will teach SMART goals and how to break tasks down into smaller tasks.     Objective #C  Patient will learn 3 skills to help increase motivation and organization.   Status: Continued - Date(s): 1/2/2025    Intervention(s)  Therapist will assign homework of using coping skills to increase motivation and organizational skills.    Objective #D  Patient will learn about how ADHD can impact social interactions and interpersonal relationships.  Status:Continued - Date(s): 1/2/2025    Intervention(s)  Therapist will teach about how ADHD can impact social interactions and interpersonal relationships.     Objective #D  Patient will create and adhere to a routine.  Status:Continued - Date(s):  1/2/2025    Intervention(s)  Therapist will assign homework to create a routine and teach about how to maintain routine.         Goal 2: Client will work on stabilizing anxiety symptoms as evidenced by LYUBOV-7 scores (current is 14) and Self report.  Goal is to reduce by three points.      I will know I've met my goal when I can better manage my anxiety .      Objective #A Client will identify triggers of anxiety and process them in session.    Status: Continued - Date(s): 1/2/2025    Intervention(s)  Therapist will teach emotional recognition/identification by assigning homework to journal with written exercises.    Objective #B  Client will identify initial signs or symptoms of anxiety.    Status: Continued - Date(s): 1/2/2025    Intervention(s)  Therapist will teach emotional regulation skills, such as deep breathing and mindfulness skills.    Objective #C  Client will learn about co-morbidities between ADHD and LYUBOV.   Status: Continued - Date(s): 1/2/2025    Intervention(s)  Therapist will provide educational materials on symptoms of ADHD and LYUBOV.        Goal 3: Patient will increase effectiveness of interpersonal communication skills.     I will know I've met my goal when I can hold my boundaries with dad.       Objective #A  Patient will increase interpersonal effectiveness skills with family and friends to help manage conflict.    Status: New - Date: 1/2/2025     Intervention(s)  Therapist will role-play conflict management.     Objective #B  Patient will build upon the authentic self.         Status:New - Date: 1/2/2025     Intervention(s)  Therapist will assign homework through written exercises.     Objective #C  Patient will bring to session interpersonal conflicts to process and explore.   Status: New - Date: 1/2/2025     Intervention(s)  Therapist will teach about healthy boundaries related to interpersonal relationships.      Patient has reviewed and agreed to the above plan.      Aline Jimenez, [facility] HospitalSW  July  28, 2023  Reviewed 10/27/2023  Re-reviewed 1/19/2024 Re-reviewed 4/4/2024  Re-reviewed 6/20/2024 Re-reviewed 10/3/2024 Re-reviewed 1/2/2025

## 2025-04-03 ENCOUNTER — VIRTUAL VISIT (OUTPATIENT)
Dept: PSYCHOLOGY | Facility: CLINIC | Age: 50
End: 2025-04-03
Payer: COMMERCIAL

## 2025-04-03 DIAGNOSIS — F90.0 ADHD (ATTENTION DEFICIT HYPERACTIVITY DISORDER), INATTENTIVE TYPE: Primary | ICD-10-CM

## 2025-04-03 DIAGNOSIS — F41.1 GAD (GENERALIZED ANXIETY DISORDER): ICD-10-CM

## 2025-04-03 ASSESSMENT — ANXIETY QUESTIONNAIRES
1. FEELING NERVOUS, ANXIOUS, OR ON EDGE: NEARLY EVERY DAY
IF YOU CHECKED OFF ANY PROBLEMS ON THIS QUESTIONNAIRE, HOW DIFFICULT HAVE THESE PROBLEMS MADE IT FOR YOU TO DO YOUR WORK, TAKE CARE OF THINGS AT HOME, OR GET ALONG WITH OTHER PEOPLE: VERY DIFFICULT
3. WORRYING TOO MUCH ABOUT DIFFERENT THINGS: MORE THAN HALF THE DAYS
GAD7 TOTAL SCORE: 15
6. BECOMING EASILY ANNOYED OR IRRITABLE: NEARLY EVERY DAY
4. TROUBLE RELAXING: NEARLY EVERY DAY
2. NOT BEING ABLE TO STOP OR CONTROL WORRYING: MORE THAN HALF THE DAYS
GAD7 TOTAL SCORE: 15
5. BEING SO RESTLESS THAT IT IS HARD TO SIT STILL: MORE THAN HALF THE DAYS
7. FEELING AFRAID AS IF SOMETHING AWFUL MIGHT HAPPEN: NOT AT ALL

## 2025-04-03 ASSESSMENT — COLUMBIA-SUICIDE SEVERITY RATING SCALE - C-SSRS
TOTAL  NUMBER OF ABORTED OR SELF INTERRUPTED ATTEMPTS SINCE LAST CONTACT: NO
2. HAVE YOU ACTUALLY HAD ANY THOUGHTS OF KILLING YOURSELF?: NO
SUICIDE, SINCE LAST CONTACT: NO
TOTAL  NUMBER OF INTERRUPTED ATTEMPTS SINCE LAST CONTACT: NO
ATTEMPT SINCE LAST CONTACT: NO
1. SINCE LAST CONTACT, HAVE YOU WISHED YOU WERE DEAD OR WISHED YOU COULD GO TO SLEEP AND NOT WAKE UP?: NO
6. HAVE YOU EVER DONE ANYTHING, STARTED TO DO ANYTHING, OR PREPARED TO DO ANYTHING TO END YOUR LIFE?: NO

## 2025-04-03 ASSESSMENT — PATIENT HEALTH QUESTIONNAIRE - PHQ9: SUM OF ALL RESPONSES TO PHQ QUESTIONS 1-9: 15

## 2025-04-03 NOTE — PROGRESS NOTES
[facility]                                     Progress Note    Patient Name: Tressa Jeong  Date: 4/3/2025        Service Type: Individual      Session Start Time: 2:03 PM  Session End Time: 2:57 PM     Session Length: 53+ min    Session #: 40    Attendees: Client attended alone    Service Modality:  Video Visit:      Provider verified identity through the following two step process.  Patient provided:  Patient is known previously to provider    Telemedicine Visit: The patient's condition can be safely assessed and treated via synchronous audio and visual telemedicine encounter.      Reason for Telemedicine Visit: Patient has requested telehealth visit    Originating Site (Patient Location): Patient's home    Distant Site (Provider Location): Provider Remote Setting- Home Office    Consent:  The patient/guardian has verbally consented to: the potential risks and benefits of telemedicine (video visit) versus in person care; bill my insurance or make self-payment for services provided; and responsibility for payment of non-covered services.     Patient would like the video invitation sent by:  My Chart    Mode of Communication:  Video Conference via [employer]    Distant Location (Provider):  Off-site    As the provider I attest to compliance with applicable laws and regulations related to telemedicine.    DATA  Extended Session (53+ minutes): PROLONGED SERVICE IN THE OUTPATIENT SETTING REQUIRING DIRECT (FACE-TO-FACE) PATIENT CONTACT BEYOND THE USUAL SERVICE:    - Patient's presenting concerns require more intensive intervention than could be completed within the usual service  Interactive Complexity: No  Crisis: No        Progress Since Last Session (Related to Symptoms / Goals / Homework):   Symptoms: Worsening based on PHQ-9 and LYUBOV-7 scores and patient self-report.    Homework: Partially completed      Episode of Care Goals: Minimal progress - ACTION (Actively working towards change);  Intervened by reinforcing change plan / affirming steps taken     Current / Ongoing Stressors and Concerns:   Patient reports she is concerned about ADHD symptoms. Patient reports she is concerned about motivation. Patient reports she is concerned about working. Patient reports she is concerned about task completion. Patient reports she is concerned about prioritizing tasks. Patient reports her dad is home for the winter. Patient reports concerns about re-entering the workforce. Patient reports she is concerned about productivity. Patient reports concerns about organization. Patient reports she is job searching. Patient reports concerns about an internal conflict. Patient reports her dad went back to [place]. Patient's mom was hospitalized. Patient reports she is concerned about her sleep habits. Patient reports concerns about burn out. Patient reports concerns about the election results. Patient reports concerns about an internal conflict. Patient reports concerns about the political climate.      Treatment Objective(s) Addressed in This Session:   Patient will learn about the diagnosis and symptoms of ADHD.   Patient will learn about how ADHD can impact social interactions and interpersonal relationships.   Patient will practice setting goals and completing them.           Patient will learn 3 skills to help increase motivation and organization.  Patient will increase interpersonal effectiveness skills with family and friends to help manage conflict.    Patient will build upon the authentic self.         Patient will bring to session interpersonal conflicts to process and explore.   Patient will create and adhere to a routine.     Intervention:   Motivational Interviewing: supported patient in processing and exploring an internal conflict.   Validated patient's emotions. Supported patient in exploring whether she would like to repair with her dad after this conflict. Supported patient in exploring what she would  like to say to her dad. Reviewed effective communication skills and discussed using them to communicate with her dad. Reviewed broken record skill to maintain boundaries. Discussed identifying a broken record statement she can use to maintain boundaries with her dad. Discussed being mindful of how effective these skills are.   Reviewed treatment plan and goals with patient.      Assessments completed prior to visit:  The following assessments were completed by patient for this visit:  PHQ9:       4/4/2024    12:00 PM 6/20/2024    12:00 PM 10/3/2024     2:00 PM 10/31/2024     2:00 PM 1/2/2025     2:00 PM 1/30/2025     2:00 PM 4/3/2025     2:00 PM   PHQ-9 SCORE   PHQ-9 Total Score 11 13 12 14 11 11 15     GAD7:       10/3/2024     2:00 PM 10/31/2024     2:00 PM 11/27/2024     1:00 PM 1/2/2025     1:54 PM 1/30/2025     2:00 PM 2/20/2025     2:00 PM 4/3/2025     2:00 PM   LYUBOV-7 SCORE   Total Score    14 (moderate anxiety)      Total Score 12 10 9 14  9 13 15       Patient-reported     PROMIS 10-Global Health (only subscores and total score):       1/19/2024    11:39 AM 2/29/2024    12:43 PM 4/4/2024    12:44 PM 6/20/2024    12:33 PM 10/3/2024     1:38 PM 1/2/2025     1:54 PM 4/3/2025     2:12 PM   PROMIS-10 Scores Only   Global Mental Health Score 10 11 11 10 11 10  9   Global Physical Health Score 17 14 15 16 15 16  16   PROMIS TOTAL - SUBSCORES 27 25 26 26 26 26  25       Patient-reported         ASSESSMENT: Current Emotional / Mental Status (status of significant symptoms):   Risk status (Self / Other harm or suicidal ideation)   Patient denies current fears or concerns for personal safety.   Patient denies current or recent suicidal ideation or behaviors.   Patient denies current or recent homicidal ideation or behaviors.   Patient denies current or recent self injurious behavior or ideation.   Patient denies other safety concerns.   Patient reports there has been no change in risk factors since their last session.      Patient reports there has been no change in protective factors since their last session.     Recommended that patient call 911 or go to the local ED should there be a change in any of these risk factors     Appearance:   Appropriate    Eye Contact:   Good    Psychomotor Behavior: Normal    Attitude:   Cooperative    Orientation:   All   Speech    Rate / Production: Hyperverbal     Volume:  Normal    Mood:    Anxious  Sad    Affect:    Tearful Worrisome    Thought Content:  Rumination    Thought Form:  Logical    Insight:    Good      Medication Review:   No changes to current psychiatric medication(s)     Medication Compliance:   Yes     Changes in Health Issues:   None reported     Chemical Use Review:   Substance Use: Chemical use reviewed, no active concerns identified      Tobacco Use: No current tobacco use.      Diagnosis:  1. ADHD (attention deficit hyperactivity disorder), inattentive type    2. LYUBOV (generalized anxiety disorder)        Collateral Reports Completed:   Not Applicable    PLAN: (Patient Tasks / Therapist Tasks / Other)  Patient will identify a broken record statement. Patient will use effective communication skills with her dad. Patient will be mindful of how effective this is.   We will discuss next session.       Aline Jimenez, [facility] 4/3/2025    ___________________________________________________________                                              Individual Treatment Plan    Patient's Name: Tressa Jeong  YOB: 1975    Date of Creation: 7/28/2023  Date Treatment Plan Last Reviewed/Revised: 4/3/2025  DSM5 Diagnoses: Attention-Deficit/Hyperactivity Disorder  314.00 (F90.0) Predominantly inattentive presentation or 300.02 (F41.1) Generalized Anxiety Disorder  Psychosocial / Contextual Factors: Patient is currently unemployed. Patient is living with her dad part time. Patient is single.   PROMIS (reviewed every 90 days):The following assessments were completed by patient for this  visit:  PROMIS 10-Global Health (only subscores and total score):       1/19/2024    11:39 AM 2/29/2024    12:43 PM 4/4/2024    12:44 PM 6/20/2024    12:33 PM 10/3/2024     1:38 PM 1/2/2025     1:54 PM 4/3/2025     2:12 PM   PROMIS-10 Scores Only   Global Mental Health Score 10 11 11 10 11 10  9   Global Physical Health Score 17 14 15 16 15 16  16   PROMIS TOTAL - SUBSCORES 27 25 26 26 26 26  25       Patient-reported        Referral / Collaboration:  Referral to another professional/service is not indicated at this time.    Anticipated number of session for this episode of care:  50  Anticipation frequency of session: Every other week  Anticipated Duration of each session: 53 or more minutes  Treatment plan will be reviewed in 90 days or when goals have been changed. There has been demonstrated improvement in functioning while patient has been engaged in psychotherapy/psychological service- if withdrawn the patient would deteriorate and/or relapse.     MeasurableTreatment Goal(s) related to diagnosis / functional impairment(s)  Goal 1: Patient will work on stabilizing symptoms of ADHD.      Objective #A  Patient will learn about the diagnosis and symptoms of ADHD.   Status: Continued - Date(s): 4/3/2025    Intervention(s)  Therapist will teach about the diagnosis of ADHD.     Objective #B  Patient will practice setting goals and completing them.                         Status: Continued - Date(s): 4/3/2025     Intervention(s)  Therapist will teach SMART goals and how to break tasks down into smaller tasks.     Objective #C  Patient will learn 3 skills to help increase motivation and organization.   Status: Continued - Date(s): 4/3/2025    Intervention(s)  Therapist will assign homework of using coping skills to increase motivation and organizational skills.    Objective #D  Patient will learn about how ADHD can impact social interactions and interpersonal relationships.  Status:Continued - Date(s):  4/3/2025    Intervention(s)  Therapist will teach about how ADHD can impact social interactions and interpersonal relationships.     Objective #D  Patient will create and adhere to a routine.  Status:Continued - Date(s): 4/3/2025    Intervention(s)  Therapist will assign homework to create a routine and teach about how to maintain routine.         Goal 2: Client will work on stabilizing anxiety symptoms as evidenced by LYUBOV-7 scores (current is 15) and Self report.  Goal is to reduce by five points.      I will know I've met my goal when I can better manage my anxiety .      Objective #A Client will identify triggers of anxiety and process them in session.    Status: Continued - Date(s): 4/3/2025    Intervention(s)  Therapist will teach emotional recognition/identification by assigning homework to journal with written exercises.    Objective #B  Client will identify initial signs or symptoms of anxiety.    Status: Completed - Date: 4/3/2025    Intervention(s)  Therapist will teach emotional regulation skills, such as deep breathing and mindfulness skills.    Objective #C  Client will learn about co-morbidities between ADHD and LYUBOV.   Status: Continued - Date(s): 4/3/2025    Intervention(s)  Therapist will provide educational materials on symptoms of ADHD and LYUBOV.        Goal 3: Patient will increase effectiveness of interpersonal communication skills.     I will know I've met my goal when I can hold my boundaries with dad.       Objective #A  Patient will increase interpersonal effectiveness skills with family and friends to help manage conflict.    Status: Continued - Date(s): 4/3/2025     Intervention(s)  Therapist will role-play conflict management.     Objective #B  Patient will build upon the authentic self.         Status:Continued - Date(s): 4/3/2025     Intervention(s)  Therapist will assign homework through written exercises.     Objective #C  Patient will bring to session interpersonal conflicts to process  and explore.   Status: Continued - Date(s): 4/3/2025     Intervention(s)  Therapist will teach about healthy boundaries related to interpersonal relationships.      Patient has reviewed and agreed to the above plan.      TYE Marie  July 28, 2023  Reviewed 10/27/2023  Re-reviewed 1/19/2024 Re-reviewed 4/4/2024  Re-reviewed 6/20/2024 Re-reviewed 10/3/2024 Re-reviewed 1/2/2025  Re-reviewed 4/3/2025

## 2025-04-13 ENCOUNTER — HEALTH MAINTENANCE LETTER (OUTPATIENT)
Age: 50
End: 2025-04-13

## 2025-04-28 ENCOUNTER — OFFICE VISIT (OUTPATIENT)
Dept: FAMILY MEDICINE | Facility: CLINIC | Age: 50
End: 2025-04-28
Payer: COMMERCIAL

## 2025-04-28 VITALS
BODY MASS INDEX: 33.82 KG/M2 | SYSTOLIC BLOOD PRESSURE: 126 MMHG | OXYGEN SATURATION: 99 % | HEIGHT: 65 IN | RESPIRATION RATE: 20 BRPM | WEIGHT: 203 LBS | DIASTOLIC BLOOD PRESSURE: 82 MMHG | TEMPERATURE: 98.6 F | HEART RATE: 106 BPM

## 2025-04-28 DIAGNOSIS — Z13.220 SCREENING FOR HYPERLIPIDEMIA: ICD-10-CM

## 2025-04-28 DIAGNOSIS — N39.46 MIXED STRESS AND URGE URINARY INCONTINENCE: ICD-10-CM

## 2025-04-28 DIAGNOSIS — Z23 NEED FOR COVID-19 VACCINE: ICD-10-CM

## 2025-04-28 DIAGNOSIS — Z12.31 VISIT FOR SCREENING MAMMOGRAM: Primary | ICD-10-CM

## 2025-04-28 DIAGNOSIS — Z23 NEED FOR PNEUMOCOCCAL VACCINE: ICD-10-CM

## 2025-04-28 DIAGNOSIS — Z00.00 ADULT GENERAL MEDICAL EXAM: Primary | ICD-10-CM

## 2025-04-28 DIAGNOSIS — G47.33 OBSTRUCTIVE SLEEP APNEA SYNDROME: ICD-10-CM

## 2025-04-28 DIAGNOSIS — Z79.899 HIGH RISK MEDICATION USE: ICD-10-CM

## 2025-04-28 DIAGNOSIS — F90.9 ATTENTION DEFICIT HYPERACTIVITY DISORDER (ADHD), UNSPECIFIED ADHD TYPE: ICD-10-CM

## 2025-04-28 DIAGNOSIS — Z13.1 SCREENING FOR DIABETES MELLITUS: ICD-10-CM

## 2025-04-28 DIAGNOSIS — Z12.31 VISIT FOR SCREENING MAMMOGRAM: ICD-10-CM

## 2025-04-28 PROCEDURE — 1126F AMNT PAIN NOTED NONE PRSNT: CPT | Performed by: FAMILY MEDICINE

## 2025-04-28 PROCEDURE — 96127 BRIEF EMOTIONAL/BEHAV ASSMT: CPT | Performed by: FAMILY MEDICINE

## 2025-04-28 PROCEDURE — 99396 PREV VISIT EST AGE 40-64: CPT | Mod: 25 | Performed by: FAMILY MEDICINE

## 2025-04-28 PROCEDURE — 3074F SYST BP LT 130 MM HG: CPT | Performed by: FAMILY MEDICINE

## 2025-04-28 PROCEDURE — 99213 OFFICE O/P EST LOW 20 MIN: CPT | Mod: 25 | Performed by: FAMILY MEDICINE

## 2025-04-28 PROCEDURE — 91320 SARSCV2 VAC 30MCG TRS-SUC IM: CPT | Performed by: FAMILY MEDICINE

## 2025-04-28 PROCEDURE — 90677 PCV20 VACCINE IM: CPT | Performed by: FAMILY MEDICINE

## 2025-04-28 PROCEDURE — 90480 ADMN SARSCOV2 VAC 1/ONLY CMP: CPT | Performed by: FAMILY MEDICINE

## 2025-04-28 PROCEDURE — 90471 IMMUNIZATION ADMIN: CPT | Performed by: FAMILY MEDICINE

## 2025-04-28 PROCEDURE — 3079F DIAST BP 80-89 MM HG: CPT | Performed by: FAMILY MEDICINE

## 2025-04-28 SDOH — HEALTH STABILITY: PHYSICAL HEALTH: ON AVERAGE, HOW MANY DAYS PER WEEK DO YOU ENGAGE IN MODERATE TO STRENUOUS EXERCISE (LIKE A BRISK WALK)?: 5 DAYS

## 2025-04-28 SDOH — HEALTH STABILITY: PHYSICAL HEALTH: ON AVERAGE, HOW MANY MINUTES DO YOU ENGAGE IN EXERCISE AT THIS LEVEL?: 30 MIN

## 2025-04-28 ASSESSMENT — SOCIAL DETERMINANTS OF HEALTH (SDOH): HOW OFTEN DO YOU GET TOGETHER WITH FRIENDS OR RELATIVES?: ONCE A WEEK

## 2025-04-28 ASSESSMENT — PAIN SCALES - GENERAL: PAINLEVEL_OUTOF10: NO PAIN (0)

## 2025-04-28 NOTE — PROGRESS NOTES
"Preventive Care Visit  [facility]  RAAD ROSALINA MD VALERI, Family Medicine  Apr 28, 2025  {Provider  Link to [facility] :662087}    {PROVIDER CHARTING PREFERENCE:834351}    Nurys Bustillos is a 49 year old, presenting for the following:  Annual Visit        4/28/2025     4:02 PM   Additional Questions   Roomed by jessika          Taking ADHD medicine -   Vyvanse - and antidepressant due to anxiety   Emergency meds for anxiety     Needs CoVID vaccine -   Shingles    Had a period again - after 8 months - on 3/25/2025  Light bleeding - could feel like she was ovulating - thought it was coming   No other particular symptoms     Sometimes has urine leakage -   Didn't see referral     Had pneumonia as a child - \"I have really small lungs\"  Smoked x years -   Last quit with COVID -   Started 14, quit 18, then 18-1/2, then 21, then 22 -   It was her crutch for  ADHD    Had sleep study - uses a CPAP  Doing good on walking -   Watch tells her to take a rest  Not getting much deep sleep   Will see them in August -     Wellbutrin for ADHD, also helped her feel less fatigued  When on back, stops breathing   Trained to sleep on side     Not working -   Next goal -   Saved a whole lot of money - has MA thru         ***   {MA/LPN/RN Pre-Provider Visit Orders- hCG/UA/Strep (Optional):402780}  {SUPERLIST (Optional):543481}  {additonal problems for provider to add (Optional):502194}  Advance Care Planning  {The storyboard will display whether the patient has ACP docs on file. Hover over the Code section in the storyboard to access the ACP documents. :966219}  Health Care Directive received at today's visit.  Forwarded to [employer].        4/28/2025   General Health   How would you rate your overall physical health? Good   Feel stress (tense, anxious, or unable to sleep) Rather much   (!) STRESS CONCERN      4/28/2025   Nutrition   Three or more servings of calcium each day? Yes   Diet: " Vegetarian/vegan   How many servings of fruit and vegetables per day? 4 or more   How many sweetened beverages each day? 0-1         4/28/2025   Exercise   Days per week of moderate/strenous exercise 5 days   Average minutes spent exercising at this level 30 min         4/28/2025   Social Factors   Frequency of gathering with friends or relatives Once a week   Worry food won't last until get money to buy more Patient declined   Food not last or not have enough money for food? Patient declined   Do you have housing? (Housing is defined as stable permanent housing and does not include staying outside in a car, in a tent, in an abandoned building, in an overnight shelter, or couch-surfing.) Patient declined   Are you worried about losing your housing? Patient declined   Lack of transportation? Patient declined   Unable to get utilities (heat,electricity)? Patient declined         4/28/2025   Fall Risk   Fallen 2 or more times in the past year? No   Trouble with walking or balance? No          4/28/2025   Dental   Dentist two times every year? Yes       { Rooming Staff Patient needs a PHQ as part of the AWV.  Use this link to complete and then refresh the note to pull results Link to PHQ9 Assessment :884288}  Today's PHQ-9 Score:       4/3/2025     2:00 PM   PHQ-9 SCORE   PHQ-9 Total Score 15           4/28/2025   Substance Use   Alcohol more than 3/day or more than 7/wk No   Do you use any other substances recreationally? No     Social History     Tobacco Use    Smoking status: Former     Current packs/day: 0.50     Average packs/day: 0.5 packs/day for 10.0 years (5.0 ttl pk-yrs)     Types: Cigarettes     Passive exposure: Never    Smokeless tobacco: Never   Vaping Use    Vaping status: Never Used   Substance Use Topics    Alcohol use: No    Drug use: Not Currently     Types: Marijuana     {Provider  If there are gaps in the social history shown above, please follow the link to update and then refresh the note Link  "to Social and Substance History :936935}     {Mammogram Decision Support (Optional):838926}        4/28/2025   STI Screening   New sexual partner(s) since last STI/HIV test? No     History of abnormal Pap smear: { :004716}        Latest Ref Rng & Units 1/18/2023     5:01 PM 9/24/2007    12:00 AM   PAP / HPV   PAP  Negative for Intraepithelial Lesion or Malignancy (NILM)     PAP (Historical)   NIL    HPV 16 DNA Negative Negative     HPV 18 DNA Negative Negative     Other HR HPV Negative Negative       ASCVD Risk   The ASCVD Risk score (Bossman THAKUR, et al., 2019) failed to calculate for the following reasons:    Cannot find a previous HDL lab    Cannot find a previous total cholesterol lab    {Link to Fracture Risk Assessment Tool (Optional):625776}        4/28/2025   Contraception/Family Planning   Questions about contraception or family planning No     {Provider  REQUIRED FOR AWV Use the storyboard to review patient history, after sections have been marked as reviewed, refresh note to capture documentation:525997}   Reviewed and updated as needed this visit by Provider                    {HISTORY OPTIONS (Optional):497603}    {ROS Picklists (Optional):465058}     Objective    Exam  /82 (BP Location: Right arm, Patient Position: Sitting, Cuff Size: Adult Regular)   Pulse 106   Temp 98.6  F (37  C) (Oral)   Resp 20   Ht 1.651 m (5' 5\")   Wt 92.1 kg (203 lb)   SpO2 99%   BMI 33.78 kg/m     Estimated body mass index is 33.78 kg/m  as calculated from the following:    Height as of this encounter: 1.651 m (5' 5\").    Weight as of this encounter: 92.1 kg (203 lb).    Physical Exam  {Exam Choices (Optional):147287}    Prior to immunization administration, verified patients identity using patient s name and date of birth. Please see Immunization Activity for additional information.     Screening Questionnaire for Adult Immunization    Are you sick today?   No   Do you have allergies to medications, food, " a vaccine component or latex?   Yes   Have you ever had a serious reaction after receiving a vaccination?   No   Do you have a long-term health problem with heart, lung, kidney, or metabolic disease (e.g., diabetes), asthma, a blood disorder, no spleen, complement component deficiency, a cochlear implant, or a spinal fluid leak?  Are you on long-term aspirin therapy?   No   Do you have cancer, leukemia, HIV/AIDS, or any other immune system problem?   No   Do you have a parent, brother, or sister with an immune system problem?   No   In the past 3 months, have you taken medications that affect  your immune system, such as prednisone, other steroids, or anticancer drugs; drugs for the treatment of rheumatoid arthritis, Crohn s disease, or psoriasis; or have you had radiation treatments?   No   Have you had a seizure, or a brain or other nervous system problem?   No   During the past year, have you received a transfusion of blood or blood    products, or been given immune (gamma) globulin or antiviral drug?   No   For women: Are you pregnant or is there a chance you could become       pregnant during the next month?   No   Have you received any vaccinations in the past 4 weeks?   No     Immunization questionnaire was positive for at least one answer.  Notified .      Patient instructed to remain in clinic for 15 minutes afterwards, and to report any adverse reactions.     Screening performed by Roxana Lomas MA on 4/28/2025 at 4:06 PM.         Signed Electronically by: RAAD TRAMMELL MD  {Email feedback regarding this note to [email or web address]   :261878}   "for bug bites on trunk or extremities 60 g 2    sertraline (ZOLOFT) 25 MG tablet Take 125 mg by mouth daily.      SF 5000 PLUS 1.1 % CREA BRUSH WITH A SMALL AMOUNT 2 TIMES PER DAY. DO NOT SWALLOW. DO NOT EAT, DRINK OR RINSE FOR 30 MINUTES AFTERWARD.*       Allergies   Allergen Reactions    Latex     Morphine      Not life treated, burning and painful when administ     No lab results found.     Review of Systems   Constitutional:  Negative for chills and fever.   HENT:  Negative for ear pain and sore throat.    Eyes:  Negative for pain and visual disturbance.   Respiratory:  Negative for cough and shortness of breath.    Cardiovascular:  Negative for chest pain and palpitations.   Gastrointestinal:  Negative for abdominal pain and vomiting.   Genitourinary:  Negative for dysuria and hematuria.        Urinary incontinence     Musculoskeletal:  Negative for arthralgias and back pain.   Skin:  Negative for color change and rash.   Neurological:  Negative for seizures and syncope.   Psychiatric/Behavioral:  Positive for sleep disturbance (improved with CPAP).    All other systems reviewed and are negative.         Objective    Exam  /82 (BP Location: Right arm, Patient Position: Sitting, Cuff Size: Adult Regular)   Pulse 106   Temp 98.6  F (37  C) (Oral)   Resp 20   Ht 1.651 m (5' 5\")   Wt 92.1 kg (203 lb)   SpO2 99%   BMI 33.78 kg/m     Estimated body mass index is 33.78 kg/m  as calculated from the following:    Height as of this encounter: 1.651 m (5' 5\").    Weight as of this encounter: 92.1 kg (203 lb).    Physical Exam  Vitals reviewed.   Constitutional:       General: She is not in acute distress.     Appearance: Normal appearance.   HENT:      Head: Normocephalic.      Right Ear: Tympanic membrane, ear canal and external ear normal.      Left Ear: Tympanic membrane, ear canal and external ear normal.      Nose: Nose normal.      Mouth/Throat:      Mouth: Mucous membranes are moist.      Pharynx: No " posterior oropharyngeal erythema.   Eyes:      Extraocular Movements: Extraocular movements intact.      Conjunctiva/sclera: Conjunctivae normal.      Pupils: Pupils are equal, round, and reactive to light.   Cardiovascular:      Rate and Rhythm: Normal rate and regular rhythm.      Pulses: Normal pulses.      Heart sounds: Normal heart sounds. No murmur heard.  Pulmonary:      Effort: Pulmonary effort is normal.      Breath sounds: Normal breath sounds.   Abdominal:      Palpations: Abdomen is soft. There is no mass.      Tenderness: There is no abdominal tenderness. There is no guarding or rebound.   Musculoskeletal:         General: No deformity. Normal range of motion.      Cervical back: Normal range of motion and neck supple.   Lymphadenopathy:      Cervical: No cervical adenopathy.   Skin:     General: Skin is warm and dry.   Neurological:      General: No focal deficit present.      Mental Status: She is alert.   Psychiatric:         Mood and Affect: Mood normal.         Behavior: Behavior normal.       No results found for any visits on 04/28/25.      Prior to immunization administration, verified patients identity using patient s name and date of birth. Please see Immunization Activity for additional information.     Screening Questionnaire for Adult Immunization    Are you sick today?   No   Do you have allergies to medications, food, a vaccine component or latex?   Yes   Have you ever had a serious reaction after receiving a vaccination?   No   Do you have a long-term health problem with heart, lung, kidney, or metabolic disease (e.g., diabetes), asthma, a blood disorder, no spleen, complement component deficiency, a cochlear implant, or a spinal fluid leak?  Are you on long-term aspirin therapy?   No   Do you have cancer, leukemia, HIV/AIDS, or any other immune system problem?   No   Do you have a parent, brother, or sister with an immune system problem?   No   In the past 3 months, have you taken  medications that affect  your immune system, such as prednisone, other steroids, or anticancer drugs; drugs for the treatment of rheumatoid arthritis, Crohn s disease, or psoriasis; or have you had radiation treatments?   No   Have you had a seizure, or a brain or other nervous system problem?   No   During the past year, have you received a transfusion of blood or blood    products, or been given immune (gamma) globulin or antiviral drug?   No   For women: Are you pregnant or is there a chance you could become       pregnant during the next month?   No   Have you received any vaccinations in the past 4 weeks?   No     Immunization questionnaire was positive for at least one answer.  Notified .      Patient instructed to remain in clinic for 15 minutes afterwards, and to report any adverse reactions.     Screening performed by Roxana Lomas MA on 4/28/2025 at 4:06 PM.         Signed Electronically by: RAAD TRAMMELL MD

## 2025-04-28 NOTE — PATIENT INSTRUCTIONS
You are due for a shingles shot  - shingles is a re-activation of the chicken pox virus in your body.  It can cause a very painful rash.  The rash can get better while the pain continues for some time afterwards (sometimes up to a lifetime of pain from this rash).  We recommend that you protect yourself from this condition with this shot (actually, 2 shots - one now and one in 2-6 months).  These are expensive and sometimes not covered by the insurance company.  It is important to figure out if your insurance company covers the shot ; and if they do cover it, where should you get it?  (sometimes your insurance will cover it only if it is given at the pharmacy, sometimes they will only cover it if it is given at the clinic).  Make sure you know the answer to these questions:    Call your insurance company (number on back of your card) and ask them:  1.  Does my insurance cover the Shingrix (the new shingles shot)? , and if so, then  2.  Do I need to get that at my doctor's office or at my pharmacy - does it matter?

## 2025-04-29 ENCOUNTER — PATIENT OUTREACH (OUTPATIENT)
Dept: CARE COORDINATION | Facility: CLINIC | Age: 50
End: 2025-04-29
Payer: COMMERCIAL

## 2025-05-01 ENCOUNTER — VIRTUAL VISIT (OUTPATIENT)
Dept: PSYCHOLOGY | Facility: CLINIC | Age: 50
End: 2025-05-01
Payer: COMMERCIAL

## 2025-05-01 DIAGNOSIS — F90.0 ADHD (ATTENTION DEFICIT HYPERACTIVITY DISORDER), INATTENTIVE TYPE: Primary | ICD-10-CM

## 2025-05-01 DIAGNOSIS — F41.1 GAD (GENERALIZED ANXIETY DISORDER): ICD-10-CM

## 2025-05-01 ASSESSMENT — PATIENT HEALTH QUESTIONNAIRE - PHQ9: SUM OF ALL RESPONSES TO PHQ QUESTIONS 1-9: 15

## 2025-05-15 ENCOUNTER — VIRTUAL VISIT (OUTPATIENT)
Dept: PSYCHOLOGY | Facility: CLINIC | Age: 50
End: 2025-05-15
Payer: COMMERCIAL

## 2025-05-15 DIAGNOSIS — F41.1 GAD (GENERALIZED ANXIETY DISORDER): ICD-10-CM

## 2025-05-15 DIAGNOSIS — F90.0 ADHD (ATTENTION DEFICIT HYPERACTIVITY DISORDER), INATTENTIVE TYPE: Primary | ICD-10-CM

## 2025-05-15 ASSESSMENT — ANXIETY QUESTIONNAIRES
GAD7 TOTAL SCORE: 9
1. FEELING NERVOUS, ANXIOUS, OR ON EDGE: SEVERAL DAYS
IF YOU CHECKED OFF ANY PROBLEMS ON THIS QUESTIONNAIRE, HOW DIFFICULT HAVE THESE PROBLEMS MADE IT FOR YOU TO DO YOUR WORK, TAKE CARE OF THINGS AT HOME, OR GET ALONG WITH OTHER PEOPLE: SOMEWHAT DIFFICULT
6. BECOMING EASILY ANNOYED OR IRRITABLE: SEVERAL DAYS
4. TROUBLE RELAXING: NEARLY EVERY DAY
7. FEELING AFRAID AS IF SOMETHING AWFUL MIGHT HAPPEN: NOT AT ALL
GAD7 TOTAL SCORE: 9
2. NOT BEING ABLE TO STOP OR CONTROL WORRYING: MORE THAN HALF THE DAYS
3. WORRYING TOO MUCH ABOUT DIFFERENT THINGS: SEVERAL DAYS
5. BEING SO RESTLESS THAT IT IS HARD TO SIT STILL: SEVERAL DAYS

## 2025-05-15 NOTE — PROGRESS NOTES
[facility]                                     Progress Note    Patient Name: Tressa Jeong  Date: 5/15/2025        Service Type: Individual      Session Start Time: 1:00 PM  Session End Time: 1:54 PM     Session Length: 53+ min    Session #: 42    Attendees: Client attended alone    Service Modality:  Video Visit:      Provider verified identity through the following two step process.  Patient provided:  Patient is known previously to provider    Telemedicine Visit: The patient's condition can be safely assessed and treated via synchronous audio and visual telemedicine encounter.      Reason for Telemedicine Visit: Patient has requested telehealth visit    Originating Site (Patient Location): Patient's home    Distant Site (Provider Location): Provider Remote Setting- Home Office    Consent:  The patient/guardian has verbally consented to: the potential risks and benefits of telemedicine (video visit) versus in person care; bill my insurance or make self-payment for services provided; and responsibility for payment of non-covered services.     Patient would like the video invitation sent by:  My Chart    Mode of Communication:  Video Conference via [employer]    Distant Location (Provider):  Off-site    As the provider I attest to compliance with applicable laws and regulations related to telemedicine.    DATA  Extended Session (53+ minutes): PROLONGED SERVICE IN THE OUTPATIENT SETTING REQUIRING DIRECT (FACE-TO-FACE) PATIENT CONTACT BEYOND THE USUAL SERVICE:    - Patient's presenting concerns require more intensive intervention than could be completed within the usual service  Interactive Complexity: No  Crisis: No        Progress Since Last Session (Related to Symptoms / Goals / Homework):   Symptoms: Improving based on LYUBOV-7 scores and patient self-report.    Homework: Partially completed      Episode of Care Goals: Minimal progress - ACTION (Actively working towards change); Intervened by  reinforcing change plan / affirming steps taken     Current / Ongoing Stressors and Concerns:   Patient reports she is concerned about ADHD symptoms. Patient reports she is concerned about motivation. Patient reports she is concerned about working. Patient reports she is concerned about task completion. Patient reports she is concerned about prioritizing tasks. Patient reports her dad is home for the winter. Patient reports concerns about re-entering the workforce. Patient reports she is concerned about productivity. Patient reports concerns about organization. Patient reports she is job searching. Patient reports concerns about an internal conflict. Patient reports her dad went back to [place]. Patient's mom was hospitalized. Patient reports she is concerned about her sleep habits. Patient reports concerns about burn out. Patient reports concerns about the election results. Patient reports concerns about the political climate.      Treatment Objective(s) Addressed in This Session:   Patient will learn about the diagnosis and symptoms of ADHD.   Patient will learn about how ADHD can impact social interactions and interpersonal relationships.   Patient will practice setting goals and completing them.           Patient will learn 3 skills to help increase motivation and organization.  Patient will increase interpersonal effectiveness skills with family and friends to help manage conflict.    Patient will build upon the authentic self.         Patient will bring to session interpersonal conflicts to process and explore.   Patient will create and adhere to a routine.     Intervention:   Motivational Interviewing: supported patient in processing and exploring concerns about task completion and motivation.  Validated patient's emotions. Supported patient in processing and exploring what has worked in the past to increase task completion and motivation. Discussed how ADHD can impact task completion and motivation.  Discussed how schedules and routines can help cope with these symptoms. Supported patient in exploring what a schedule and routine would look like for her. Discussed creating and implementing a daily schedule and being mindful of how this impacts task completion and motivation. Supported patient in exploring barriers to task completion. Discussed being mindful of what barriers are present.      Assessments completed prior to visit:  The following assessments were completed by patient for this visit:  PHQ9:       6/20/2024    12:00 PM 10/3/2024     2:00 PM 10/31/2024     2:00 PM 1/2/2025     2:00 PM 1/30/2025     2:00 PM 4/3/2025     2:00 PM 5/1/2025     1:00 PM   PHQ-9 SCORE   PHQ-9 Total Score 13 12 14 11 11 15 15     GAD7:       11/27/2024     1:00 PM 1/2/2025     1:54 PM 1/30/2025     2:00 PM 2/20/2025     2:00 PM 4/3/2025     2:00 PM 4/12/2025    12:55 PM 5/15/2025     1:00 PM   LYUBOV-7 SCORE   Total Score  14 (moderate anxiety)    13 (moderate anxiety)    Total Score 9 14  9 13 15 13  9       Patient-reported     PROMIS 10-Global Health (only subscores and total score):       2/29/2024    12:43 PM 4/4/2024    12:44 PM 6/20/2024    12:33 PM 10/3/2024     1:38 PM 1/2/2025     1:54 PM 4/3/2025     2:12 PM 4/12/2025    12:55 PM   PROMIS-10 Scores Only   Global Mental Health Score 11 11 10 11 10  9 10    Global Physical Health Score 14 15 16 15 16  16 15    PROMIS TOTAL - SUBSCORES 25 26 26 26 26  25 25        Patient-reported         ASSESSMENT: Current Emotional / Mental Status (status of significant symptoms):   Risk status (Self / Other harm or suicidal ideation)   Patient denies current fears or concerns for personal safety.   Patient denies current or recent suicidal ideation or behaviors.   Patient denies current or recent homicidal ideation or behaviors.   Patient denies current or recent self injurious behavior or ideation.   Patient denies other safety concerns.   Patient reports there has been no change in  risk factors since their last session.     Patient reports there has been no change in protective factors since their last session.     Recommended that patient call 911 or go to the local ED should there be a change in any of these risk factors     Appearance:   Appropriate    Eye Contact:   Good    Psychomotor Behavior: Normal    Attitude:   Cooperative    Orientation:   All   Speech    Rate / Production: Hyperverbal     Volume:  Normal    Mood:    Anxious  Sad    Affect:    Worrisome    Thought Content:  Rumination    Thought Form:  Logical    Insight:    Good      Medication Review:   No changes to current psychiatric medication(s)     Medication Compliance:   Yes     Changes in Health Issues:   None reported     Chemical Use Review:   Substance Use: Chemical use reviewed, no active concerns identified      Tobacco Use: No current tobacco use.      Diagnosis:  1. ADHD (attention deficit hyperactivity disorder), inattentive type    2. LYUBOV (generalized anxiety disorder)        Collateral Reports Completed:   Not Applicable    PLAN: (Patient Tasks / Therapist Tasks / Other)  Patient will create and implement a daily schedule for task completion. Patient will be mindful of how this impacts task completion and motivation.  Patient will be mindful of barriers for task completion. We will discuss next session.       Aline Jimenez, [facility] 5/15/2025    ___________________________________________________________                                              Individual Treatment Plan    Patient's Name: Tressa Jeong  YOB: 1975    Date of Creation: 7/28/2023  Date Treatment Plan Last Reviewed/Revised: 4/3/2025  DSM5 Diagnoses: Attention-Deficit/Hyperactivity Disorder  314.00 (F90.0) Predominantly inattentive presentation or 300.02 (F41.1) Generalized Anxiety Disorder  Psychosocial / Contextual Factors: Patient is currently unemployed. Patient is living with her dad part time. Patient is single.   [email or web address] (reviewed  every 90 days):The following assessments were completed by patient for this visit:  PROMIS 10-Global Health (only subscores and total score):       2/29/2024    12:43 PM 4/4/2024    12:44 PM 6/20/2024    12:33 PM 10/3/2024     1:38 PM 1/2/2025     1:54 PM 4/3/2025     2:12 PM 4/12/2025    12:55 PM   PROMIS-10 Scores Only   Global Mental Health Score 11 11 10 11 10  9 10    Global Physical Health Score 14 15 16 15 16  16 15    PROMIS TOTAL - SUBSCORES 25 26 26 26 26  25 25        Patient-reported        Referral / Collaboration:  Referral to another professional/service is not indicated at this time.    Anticipated number of session for this episode of care: 50  Anticipation frequency of session: Every other week  Anticipated Duration of each session: 53 or more minutes  Treatment plan will be reviewed in 90 days or when goals have been changed. There has been demonstrated improvement in functioning while patient has been engaged in psychotherapy/psychological service- if withdrawn the patient would deteriorate and/or relapse.     MeasurableTreatment Goal(s) related to diagnosis / functional impairment(s)  Goal 1: Patient will work on stabilizing symptoms of ADHD.      Objective #A  Patient will learn about the diagnosis and symptoms of ADHD.   Status: Continued - Date(s): 4/3/2025    Intervention(s)  Therapist will teach about the diagnosis of ADHD.     Objective #B  Patient will practice setting goals and completing them.                         Status: Continued - Date(s): 4/3/2025     Intervention(s)  Therapist will teach SMART goals and how to break tasks down into smaller tasks.     Objective #C  Patient will learn 3 skills to help increase motivation and organization.   Status: Continued - Date(s): 4/3/2025    Intervention(s)  Therapist will assign homework of using coping skills to increase motivation and organizational skills.    Objective #D  Patient will learn about how ADHD can impact social interactions  and interpersonal relationships.  Status:Continued - Date(s): 4/3/2025    Intervention(s)  Therapist will teach about how ADHD can impact social interactions and interpersonal relationships.     Objective #D  Patient will create and adhere to a routine.  Status:Continued - Date(s): 4/3/2025    Intervention(s)  Therapist will assign homework to create a routine and teach about how to maintain routine.         Goal 2: Client will work on stabilizing anxiety symptoms as evidenced by LYUBOV-7 scores (current is 15) and Self report.  Goal is to reduce by five points.      I will know I've met my goal when I can better manage my anxiety .      Objective #A Client will identify triggers of anxiety and process them in session.    Status: Continued - Date(s): 4/3/2025    Intervention(s)  Therapist will teach emotional recognition/identification by assigning homework to journal with written exercises.    Objective #B  Client will identify initial signs or symptoms of anxiety.    Status: Completed - Date: 4/3/2025    Intervention(s)  Therapist will teach emotional regulation skills, such as deep breathing and mindfulness skills.    Objective #C  Client will learn about co-morbidities between ADHD and LYUBOV.   Status: Continued - Date(s): 4/3/2025    Intervention(s)  Therapist will provide educational materials on symptoms of ADHD and LYUBOV.        Goal 3: Patient will increase effectiveness of interpersonal communication skills.     I will know I've met my goal when I can hold my boundaries with dad.       Objective #A  Patient will increase interpersonal effectiveness skills with family and friends to help manage conflict.    Status: Continued - Date(s): 4/3/2025     Intervention(s)  Therapist will role-play conflict management.     Objective #B  Patient will build upon the authentic self.         Status:Continued - Date(s): 4/3/2025     Intervention(s)  Therapist will assign homework through written exercises.     Objective  #C  Patient will bring to session interpersonal conflicts to process and explore.   Status: Continued - Date(s): 4/3/2025     Intervention(s)  Therapist will teach about healthy boundaries related to interpersonal relationships.      Patient has reviewed and agreed to the above plan.      TYE Marie  July 28, 2023  Reviewed 10/27/2023  Re-reviewed 1/19/2024 Re-reviewed 4/4/2024  Re-reviewed 6/20/2024 Re-reviewed 10/3/2024 Re-reviewed 1/2/2025  Re-reviewed 4/3/2025

## 2025-05-18 ASSESSMENT — ENCOUNTER SYMPTOMS
COLOR CHANGE: 0
ABDOMINAL PAIN: 0
SEIZURES: 0
HEMATURIA: 0
FEVER: 0
DYSURIA: 0
PALPITATIONS: 0
BACK PAIN: 0
SORE THROAT: 0
SHORTNESS OF BREATH: 0
COUGH: 0
EYE PAIN: 0
CHILLS: 0
VOMITING: 0
ARTHRALGIAS: 0
SLEEP DISTURBANCE: 1

## 2025-05-29 ENCOUNTER — VIRTUAL VISIT (OUTPATIENT)
Dept: PSYCHOLOGY | Facility: CLINIC | Age: 50
End: 2025-05-29
Payer: COMMERCIAL

## 2025-05-29 DIAGNOSIS — F41.1 GAD (GENERALIZED ANXIETY DISORDER): ICD-10-CM

## 2025-05-29 DIAGNOSIS — F90.0 ADHD (ATTENTION DEFICIT HYPERACTIVITY DISORDER), INATTENTIVE TYPE: Primary | ICD-10-CM

## 2025-05-29 NOTE — PROGRESS NOTES
[facility]                                     Progress Note    Patient Name: Tressa Jeong  Date: 5/29/2025        Service Type: Individual      Session Start Time: 1:01 PM  Session End Time: 1:55 PM     Session Length: 53+ min    Session #: 43    Attendees: Client attended alone    Service Modality:  Video Visit:      Provider verified identity through the following two step process.  Patient provided:  Patient is known previously to provider    Telemedicine Visit: The patient's condition can be safely assessed and treated via synchronous audio and visual telemedicine encounter.      Reason for Telemedicine Visit: Patient has requested telehealth visit    Originating Site (Patient Location): Patient's home    Distant Site (Provider Location): Provider Remote Setting- Home Office    Consent:  The patient/guardian has verbally consented to: the potential risks and benefits of telemedicine (video visit) versus in person care; bill my insurance or make self-payment for services provided; and responsibility for payment of non-covered services.     Patient would like the video invitation sent by:  My Chart    Mode of Communication:  Video Conference via [employer]    Distant Location (Provider):  Off-site    As the provider I attest to compliance with applicable laws and regulations related to telemedicine.    DATA  Extended Session (53+ minutes): PROLONGED SERVICE IN THE OUTPATIENT SETTING REQUIRING DIRECT (FACE-TO-FACE) PATIENT CONTACT BEYOND THE USUAL SERVICE:    - Patient's presenting concerns require more intensive intervention than could be completed within the usual service  Interactive Complexity: No  Crisis: No        Progress Since Last Session (Related to Symptoms / Goals / Homework):   Symptoms: No change based on patient self-report.     Homework: Partially completed      Episode of Care Goals: Minimal progress - ACTION (Actively working towards change); Intervened by reinforcing change  plan / affirming steps taken     Current / Ongoing Stressors and Concerns:   Patient reports she is concerned about ADHD symptoms. Patient reports she is concerned about motivation. Patient reports she is concerned about working. Patient reports she is concerned about task completion. Patient reports she is concerned about prioritizing tasks. Patient reports her dad is home for the winter. Patient reports concerns about re-entering the workforce. Patient reports she is concerned about productivity. Patient reports concerns about organization. Patient reports she is job searching. Patient reports concerns about an internal conflict. Patient reports her dad went back to [place]. Patient's mom was hospitalized. Patient reports she is concerned about her sleep habits. Patient reports concerns about burn out. Patient reports concerns about the election results. Patient reports concerns about the political climate.      Treatment Objective(s) Addressed in This Session:   Patient will learn about the diagnosis and symptoms of ADHD.   Patient will learn about how ADHD can impact social interactions and interpersonal relationships.   Patient will practice setting goals and completing them.           Patient will learn 3 skills to help increase motivation and organization.  Patient will increase interpersonal effectiveness skills with family and friends to help manage conflict.    Patient will build upon the authentic self.         Patient will bring to session interpersonal conflicts to process and explore.   Patient will create and adhere to a routine.     Intervention:   Motivational Interviewing: supported patient in processing and exploring concerns about task completion and motivation. Validated patient's emotions. Supported patient in processing and exploring how she was able to body double with her neighbor this week. Validated patient's use of skills. Discussed continuing to practice body doubling and supported  patient in identifying members of her support system that would do this for her. Reviewed creating a schedule for when she would like to accomplish tasks. Supported patient in processing and exploring how she is more productive in the mornings. Discussed implementing her daily schedule for completing tasks. Reviewed breaking down large tasks into smaller more manageable parts. Discussed being mindful of how effective these skills are.     Assessments completed prior to visit:  The following assessments were completed by patient for this visit:  PHQ9:       6/20/2024    12:00 PM 10/3/2024     2:00 PM 10/31/2024     2:00 PM 1/2/2025     2:00 PM 1/30/2025     2:00 PM 4/3/2025     2:00 PM 5/1/2025     1:00 PM   PHQ-9 SCORE   PHQ-9 Total Score 13 12 14 11 11 15 15     GAD7:       11/27/2024     1:00 PM 1/2/2025     1:54 PM 1/30/2025     2:00 PM 2/20/2025     2:00 PM 4/3/2025     2:00 PM 4/12/2025    12:55 PM 5/15/2025     1:00 PM   LYUBOV-7 SCORE   Total Score  14 (moderate anxiety)    13 (moderate anxiety)    Total Score 9 14  9 13 15 13  9       Patient-reported     PROMIS 10-Global Health (only subscores and total score):       2/29/2024    12:43 PM 4/4/2024    12:44 PM 6/20/2024    12:33 PM 10/3/2024     1:38 PM 1/2/2025     1:54 PM 4/3/2025     2:12 PM 4/12/2025    12:55 PM   PROMIS-10 Scores Only   Global Mental Health Score 11 11 10 11 10  9 10    Global Physical Health Score 14 15 16 15 16  16 15    PROMIS TOTAL - SUBSCORES 25 26 26 26 26  25 25        Patient-reported         ASSESSMENT: Current Emotional / Mental Status (status of significant symptoms):   Risk status (Self / Other harm or suicidal ideation)   Patient denies current fears or concerns for personal safety.   Patient denies current or recent suicidal ideation or behaviors.   Patient denies current or recent homicidal ideation or behaviors.   Patient denies current or recent self injurious behavior or ideation.   Patient denies other safety  concerns.   Patient reports there has been no change in risk factors since their last session.     Patient reports there has been no change in protective factors since their last session.     Recommended that patient call 911 or go to the local ED should there be a change in any of these risk factors     Appearance:   Appropriate    Eye Contact:   Good    Psychomotor Behavior: Normal    Attitude:   Cooperative    Orientation:   All   Speech    Rate / Production: Hyperverbal     Volume:  Normal    Mood:    Anxious  Sad    Affect:    Worrisome    Thought Content:  Rumination    Thought Form:  Logical    Insight:    Good      Medication Review:   No changes to current psychiatric medication(s)     Medication Compliance:   Yes     Changes in Health Issues:   None reported     Chemical Use Review:   Substance Use: Chemical use reviewed, no active concerns identified      Tobacco Use: No current tobacco use.      Diagnosis:  1. ADHD (attention deficit hyperactivity disorder), inattentive type    2. LYUBOV (generalized anxiety disorder)        Collateral Reports Completed:   Not Applicable    PLAN: (Patient Tasks / Therapist Tasks / Other)  Patient will implement her daily schedule for completing tasks. Patient will break down tasks into smaller, more manageable parts. Patient will practice body doubling to help encourage task completion. Patient will be mindful of how effective these skills are.  We will discuss next session.       Aline Jimenez, [facility] 5/29/2025    ___________________________________________________________                                              Individual Treatment Plan    Patient's Name: Tressa Jeong  YOB: 1975    Date of Creation: 7/28/2023  Date Treatment Plan Last Reviewed/Revised: 4/3/2025  DSM5 Diagnoses: Attention-Deficit/Hyperactivity Disorder  314.00 (F90.0) Predominantly inattentive presentation or 300.02 (F41.1) Generalized Anxiety Disorder  Psychosocial / Contextual Factors:  Patient is currently unemployed. Patient is living with her dad part time. Patient is single.   PROMIS (reviewed every 90 days):The following assessments were completed by patient for this visit:  PROMIS 10-Global Health (only subscores and total score):       2/29/2024    12:43 PM 4/4/2024    12:44 PM 6/20/2024    12:33 PM 10/3/2024     1:38 PM 1/2/2025     1:54 PM 4/3/2025     2:12 PM 4/12/2025    12:55 PM   PROMIS-10 Scores Only   Global Mental Health Score 11 11 10 11 10  9 10    Global Physical Health Score 14 15 16 15 16  16 15    PROMIS TOTAL - SUBSCORES 25 26 26 26 26  25 25        Patient-reported        Referral / Collaboration:  Referral to another professional/service is not indicated at this time.    Anticipated number of session for this episode of care: 50  Anticipation frequency of session: Every other week  Anticipated Duration of each session: 53 or more minutes  Treatment plan will be reviewed in 90 days or when goals have been changed. There has been demonstrated improvement in functioning while patient has been engaged in psychotherapy/psychological service- if withdrawn the patient would deteriorate and/or relapse.     MeasurableTreatment Goal(s) related to diagnosis / functional impairment(s)  Goal 1: Patient will work on stabilizing symptoms of ADHD.      Objective #A  Patient will learn about the diagnosis and symptoms of ADHD.   Status: Continued - Date(s): 4/3/2025    Intervention(s)  Therapist will teach about the diagnosis of ADHD.     Objective #B  Patient will practice setting goals and completing them.                         Status: Continued - Date(s): 4/3/2025     Intervention(s)  Therapist will teach SMART goals and how to break tasks down into smaller tasks.     Objective #C  Patient will learn 3 skills to help increase motivation and organization.   Status: Continued - Date(s): 4/3/2025    Intervention(s)  Therapist will assign homework of using coping skills to increase  motivation and organizational skills.    Objective #D  Patient will learn about how ADHD can impact social interactions and interpersonal relationships.  Status:Continued - Date(s): 4/3/2025    Intervention(s)  Therapist will teach about how ADHD can impact social interactions and interpersonal relationships.     Objective #D  Patient will create and adhere to a routine.  Status:Continued - Date(s): 4/3/2025    Intervention(s)  Therapist will assign homework to create a routine and teach about how to maintain routine.         Goal 2: Client will work on stabilizing anxiety symptoms as evidenced by LYUBOV-7 scores (current is 15) and Self report.  Goal is to reduce by five points.      I will know I've met my goal when I can better manage my anxiety .      Objective #A Client will identify triggers of anxiety and process them in session.    Status: Continued - Date(s): 4/3/2025    Intervention(s)  Therapist will teach emotional recognition/identification by assigning homework to journal with written exercises.    Objective #B  Client will identify initial signs or symptoms of anxiety.    Status: Completed - Date: 4/3/2025    Intervention(s)  Therapist will teach emotional regulation skills, such as deep breathing and mindfulness skills.    Objective #C  Client will learn about co-morbidities between ADHD and LYUBOV.   Status: Continued - Date(s): 4/3/2025    Intervention(s)  Therapist will provide educational materials on symptoms of ADHD and LYUBOV.        Goal 3: Patient will increase effectiveness of interpersonal communication skills.     I will know I've met my goal when I can hold my boundaries with dad.       Objective #A  Patient will increase interpersonal effectiveness skills with family and friends to help manage conflict.    Status: Continued - Date(s): 4/3/2025     Intervention(s)  Therapist will role-play conflict management.     Objective #B  Patient will build upon the authentic self.         Status:Continued  - Date(s): 4/3/2025     Intervention(s)  Therapist will assign homework through written exercises.     Objective #C  Patient will bring to session interpersonal conflicts to process and explore.   Status: Continued - Date(s): 4/3/2025     Intervention(s)  Therapist will teach about healthy boundaries related to interpersonal relationships.      Patient has reviewed and agreed to the above plan.      TYE Marie  July 28, 2023  Reviewed 10/27/2023  Re-reviewed 1/19/2024 Re-reviewed 4/4/2024  Re-reviewed 6/20/2024 Re-reviewed 10/3/2024 Re-reviewed 1/2/2025  Re-reviewed 4/3/2025

## 2025-06-12 ENCOUNTER — VIRTUAL VISIT (OUTPATIENT)
Dept: PSYCHOLOGY | Facility: CLINIC | Age: 50
End: 2025-06-12
Payer: COMMERCIAL

## 2025-06-12 DIAGNOSIS — F90.0 ADHD (ATTENTION DEFICIT HYPERACTIVITY DISORDER), INATTENTIVE TYPE: Primary | ICD-10-CM

## 2025-06-12 DIAGNOSIS — F41.1 GAD (GENERALIZED ANXIETY DISORDER): ICD-10-CM

## 2025-06-12 ASSESSMENT — ANXIETY QUESTIONNAIRES
6. BECOMING EASILY ANNOYED OR IRRITABLE: MORE THAN HALF THE DAYS
4. TROUBLE RELAXING: MORE THAN HALF THE DAYS
5. BEING SO RESTLESS THAT IT IS HARD TO SIT STILL: SEVERAL DAYS
7. FEELING AFRAID AS IF SOMETHING AWFUL MIGHT HAPPEN: NOT AT ALL
GAD7 TOTAL SCORE: 7
GAD7 TOTAL SCORE: 7
2. NOT BEING ABLE TO STOP OR CONTROL WORRYING: NOT AT ALL
1. FEELING NERVOUS, ANXIOUS, OR ON EDGE: SEVERAL DAYS
3. WORRYING TOO MUCH ABOUT DIFFERENT THINGS: SEVERAL DAYS
IF YOU CHECKED OFF ANY PROBLEMS ON THIS QUESTIONNAIRE, HOW DIFFICULT HAVE THESE PROBLEMS MADE IT FOR YOU TO DO YOUR WORK, TAKE CARE OF THINGS AT HOME, OR GET ALONG WITH OTHER PEOPLE: SOMEWHAT DIFFICULT

## 2025-06-12 ASSESSMENT — PATIENT HEALTH QUESTIONNAIRE - PHQ9
10. IF YOU CHECKED OFF ANY PROBLEMS, HOW DIFFICULT HAVE THESE PROBLEMS MADE IT FOR YOU TO DO YOUR WORK, TAKE CARE OF THINGS AT HOME, OR GET ALONG WITH OTHER PEOPLE: SOMEWHAT DIFFICULT
SUM OF ALL RESPONSES TO PHQ QUESTIONS 1-9: 9
SUM OF ALL RESPONSES TO PHQ QUESTIONS 1-9: 9

## 2025-06-12 NOTE — PROGRESS NOTES
[facility]                                     Progress Note    Patient Name: Tressa Jeong  Date: 6/12/2025        Service Type: Individual      Session Start Time: 3:01 PM  Session End Time: 3:56 PM     Session Length: 53+ min    Session #: 44    Attendees: Client attended alone    Service Modality:  Video Visit:      Provider verified identity through the following two step process.  Patient provided:  Patient is known previously to provider    Telemedicine Visit: The patient's condition can be safely assessed and treated via synchronous audio and visual telemedicine encounter.      Reason for Telemedicine Visit: Patient has requested telehealth visit    Originating Site (Patient Location): Patient's home    Distant Site (Provider Location): Provider Remote Setting- Home Office    Consent:  The patient/guardian has verbally consented to: the potential risks and benefits of telemedicine (video visit) versus in person care; bill my insurance or make self-payment for services provided; and responsibility for payment of non-covered services.     Patient would like the video invitation sent by:  My Chart    Mode of Communication:  Video Conference via [employer]    Distant Location (Provider):  Off-site    As the provider I attest to compliance with applicable laws and regulations related to telemedicine.    DATA  Extended Session (53+ minutes): PROLONGED SERVICE IN THE OUTPATIENT SETTING REQUIRING DIRECT (FACE-TO-FACE) PATIENT CONTACT BEYOND THE USUAL SERVICE:    - Patient's presenting concerns require more intensive intervention than could be completed within the usual service  Interactive Complexity: No  Crisis: No        Progress Since Last Session (Related to Symptoms / Goals / Homework):   Symptoms: Improving based on PHQ-9 and LYUBOV-7 scores and patient self-report.     Homework: Partially completed      Episode of Care Goals: Minimal progress - ACTION (Actively working towards change);  Intervened by reinforcing change plan / affirming steps taken     Current / Ongoing Stressors and Concerns:   Patient reports she is concerned about ADHD symptoms. Patient reports she is concerned about motivation. Patient reports she is concerned about working. Patient reports she is concerned about task completion. Patient reports she is concerned about prioritizing tasks. Patient reports her dad is home for the winter. Patient reports concerns about re-entering the workforce. Patient reports she is concerned about productivity. Patient reports concerns about organization. Patient reports she is job searching. Patient reports concerns about an internal conflict. Patient reports her dad went back to [place]. Patient's mom was hospitalized. Patient reports she is concerned about her sleep habits. Patient reports concerns about burn out. Patient reports concerns about the election results. Patient reports concerns about the political climate.      Treatment Objective(s) Addressed in This Session:   Patient will learn about the diagnosis and symptoms of ADHD.   Patient will learn about how ADHD can impact social interactions and interpersonal relationships.   Patient will practice setting goals and completing them.           Patient will learn 3 skills to help increase motivation and organization.  Patient will increase interpersonal effectiveness skills with family and friends to help manage conflict.    Patient will build upon the authentic self.         Patient will bring to session interpersonal conflicts to process and explore.   Patient will create and adhere to a routine.     Intervention:   Motivational Interviewing: supported patient in processing and exploring motivation and productivity.  Validated patient's emotions. Supported patient in processing and exploring what was successful in helping her motivate to start tasks and complete them. Validated patient's use of skills. Discussed continuing to adhere to  her morning schedule to complete tasks. Supported patient in processing and exploring body doubling. Discussed practicing this and being mindful of how it impacts productivity. Supported patient in processing and exploring feeling proud of herself for completing tasks.     Assessments completed prior to visit:  The following assessments were completed by patient for this visit:  PHQ9:       10/3/2024     2:00 PM 10/31/2024     2:00 PM 1/2/2025     2:00 PM 1/30/2025     2:00 PM 4/3/2025     2:00 PM 5/1/2025     1:00 PM 6/12/2025     1:55 PM   PHQ-9 SCORE   PHQ-9 Total Score MyChart       9 (Mild depression)   PHQ-9 Total Score 12 14 11 11 15 15 9        Patient-reported     GAD7:       1/2/2025     1:54 PM 1/30/2025     2:00 PM 2/20/2025     2:00 PM 4/3/2025     2:00 PM 4/12/2025    12:55 PM 5/15/2025     1:00 PM 6/12/2025     3:00 PM   LYUBOV-7 SCORE   Total Score 14 (moderate anxiety)    13 (moderate anxiety)     Total Score 14  9 13 15 13  9 7       Patient-reported     PROMIS 10-Global Health (only subscores and total score):       2/29/2024    12:43 PM 4/4/2024    12:44 PM 6/20/2024    12:33 PM 10/3/2024     1:38 PM 1/2/2025     1:54 PM 4/3/2025     2:12 PM 4/12/2025    12:55 PM   PROMIS-10 Scores Only   Global Mental Health Score 11 11 10 11 10  9 10    Global Physical Health Score 14 15 16 15 16  16 15    PROMIS TOTAL - SUBSCORES 25 26 26 26 26  25 25        Patient-reported         ASSESSMENT: Current Emotional / Mental Status (status of significant symptoms):   Risk status (Self / Other harm or suicidal ideation)   Patient denies current fears or concerns for personal safety.   Patient denies current or recent suicidal ideation or behaviors.   Patient denies current or recent homicidal ideation or behaviors.   Patient denies current or recent self injurious behavior or ideation.   Patient denies other safety concerns.   Patient reports there has been no change in risk factors since their last session.      Patient reports there has been no change in protective factors since their last session.     Recommended that patient call 911 or go to the local ED should there be a change in any of these risk factors     Appearance:   Appropriate    Eye Contact:   Good    Psychomotor Behavior: Normal    Attitude:   Cooperative    Orientation:   All   Speech    Rate / Production: Hyperverbal     Volume:  Normal    Mood:    Anxious  Sad    Affect:    Worrisome    Thought Content:  Rumination    Thought Form:  Logical    Insight:    Good      Medication Review:   No changes to current psychiatric medication(s)     Medication Compliance:   Yes     Changes in Health Issues:   None reported     Chemical Use Review:   Substance Use: Chemical use reviewed, no active concerns identified      Tobacco Use: No current tobacco use.      Diagnosis:  1. ADHD (attention deficit hyperactivity disorder), inattentive type    2. LYUBOV (generalized anxiety disorder)        Collateral Reports Completed:   Not Applicable    PLAN: (Patient Tasks / Therapist Tasks / Other)  Patient will adhere to her morning schedule for completing tasks. Patient will practice body doubling to help encourage task completion. We will discuss next session.       Aline Jimenez, [facility] 6/12/2025    ___________________________________________________________                                              Individual Treatment Plan    Patient's Name: Tressa Jeong  YOB: 1975    Date of Creation: 7/28/2023  Date Treatment Plan Last Reviewed/Revised: 4/3/2025  DSM5 Diagnoses: Attention-Deficit/Hyperactivity Disorder  314.00 (F90.0) Predominantly inattentive presentation or 300.02 (F41.1) Generalized Anxiety Disorder  Psychosocial / Contextual Factors: Patient is currently unemployed. Patient is living with her dad part time. Patient is single.   PROMIS (reviewed every 90 days):The following assessments were completed by patient for this visit:  PROMIS 10-[employer]  (only subscores and total score):       2/29/2024    12:43 PM 4/4/2024    12:44 PM 6/20/2024    12:33 PM 10/3/2024     1:38 PM 1/2/2025     1:54 PM 4/3/2025     2:12 PM 4/12/2025    12:55 PM   PROMIS-10 Scores Only   Global Mental Health Score 11 11 10 11 10  9 10    Global Physical Health Score 14 15 16 15 16  16 15    PROMIS TOTAL - SUBSCORES 25 26 26 26 26  25 25        Patient-reported        Referral / Collaboration:  Referral to another professional/service is not indicated at this time.    Anticipated number of session for this episode of care: 50  Anticipation frequency of session: Every other week  Anticipated Duration of each session: 53 or more minutes  Treatment plan will be reviewed in 90 days or when goals have been changed. There has been demonstrated improvement in functioning while patient has been engaged in psychotherapy/psychological service- if withdrawn the patient would deteriorate and/or relapse.     MeasurableTreatment Goal(s) related to diagnosis / functional impairment(s)  Goal 1: Patient will work on stabilizing symptoms of ADHD.      Objective #A  Patient will learn about the diagnosis and symptoms of ADHD.   Status: Continued - Date(s): 4/3/2025    Intervention(s)  Therapist will teach about the diagnosis of ADHD.     Objective #B  Patient will practice setting goals and completing them.                         Status: Continued - Date(s): 4/3/2025     Intervention(s)  Therapist will teach SMART goals and how to break tasks down into smaller tasks.     Objective #C  Patient will learn 3 skills to help increase motivation and organization.   Status: Continued - Date(s): 4/3/2025    Intervention(s)  Therapist will assign homework of using coping skills to increase motivation and organizational skills.    Objective #D  Patient will learn about how ADHD can impact social interactions and interpersonal relationships.  Status:Continued - Date(s): 4/3/2025    Intervention(s)  Therapist will  teach about how ADHD can impact social interactions and interpersonal relationships.     Objective #D  Patient will create and adhere to a routine.  Status:Continued - Date(s): 4/3/2025    Intervention(s)  Therapist will assign homework to create a routine and teach about how to maintain routine.         Goal 2: Client will work on stabilizing anxiety symptoms as evidenced by LYUBOV-7 scores (current is 15) and Self report.  Goal is to reduce by five points.      I will know I've met my goal when I can better manage my anxiety .      Objective #A Client will identify triggers of anxiety and process them in session.    Status: Continued - Date(s): 4/3/2025    Intervention(s)  Therapist will teach emotional recognition/identification by assigning homework to journal with written exercises.    Objective #B  Client will identify initial signs or symptoms of anxiety.    Status: Completed - Date: 4/3/2025    Intervention(s)  Therapist will teach emotional regulation skills, such as deep breathing and mindfulness skills.    Objective #C  Client will learn about co-morbidities between ADHD and LYUBOV.   Status: Continued - Date(s): 4/3/2025    Intervention(s)  Therapist will provide educational materials on symptoms of ADHD and LYUBOV.        Goal 3: Patient will increase effectiveness of interpersonal communication skills.     I will know I've met my goal when I can hold my boundaries with dad.       Objective #A  Patient will increase interpersonal effectiveness skills with family and friends to help manage conflict.    Status: Continued - Date(s): 4/3/2025     Intervention(s)  Therapist will role-play conflict management.     Objective #B  Patient will build upon the authentic self.         Status:Continued - Date(s): 4/3/2025     Intervention(s)  Therapist will assign homework through written exercises.     Objective #C  Patient will bring to session interpersonal conflicts to process and explore.   Status: Continued - Date(s):  4/3/2025     Intervention(s)  Therapist will teach about healthy boundaries related to interpersonal relationships.      Patient has reviewed and agreed to the above plan.      TYE Marie  July 28, 2023  Reviewed 10/27/2023  Re-reviewed 1/19/2024 Re-reviewed 4/4/2024  Re-reviewed 6/20/2024 Re-reviewed 10/3/2024 Re-reviewed 1/2/2025  Re-reviewed 4/3/2025

## 2025-06-13 ENCOUNTER — ANCILLARY PROCEDURE (OUTPATIENT)
Dept: MAMMOGRAPHY | Facility: CLINIC | Age: 50
End: 2025-06-13
Attending: FAMILY MEDICINE
Payer: COMMERCIAL

## 2025-06-13 DIAGNOSIS — Z12.31 VISIT FOR SCREENING MAMMOGRAM: ICD-10-CM

## 2025-06-13 PROCEDURE — 77067 SCR MAMMO BI INCL CAD: CPT | Mod: TC | Performed by: RADIOLOGY

## 2025-06-13 PROCEDURE — 77063 BREAST TOMOSYNTHESIS BI: CPT | Mod: TC | Performed by: RADIOLOGY

## 2025-06-26 ENCOUNTER — VIRTUAL VISIT (OUTPATIENT)
Dept: PSYCHOLOGY | Facility: CLINIC | Age: 50
End: 2025-06-26
Payer: COMMERCIAL

## 2025-06-26 DIAGNOSIS — F41.1 GAD (GENERALIZED ANXIETY DISORDER): ICD-10-CM

## 2025-06-26 DIAGNOSIS — F90.0 ADHD (ATTENTION DEFICIT HYPERACTIVITY DISORDER), INATTENTIVE TYPE: Primary | ICD-10-CM

## 2025-06-26 ASSESSMENT — ANXIETY QUESTIONNAIRES
4. TROUBLE RELAXING: NEARLY EVERY DAY
GAD7 TOTAL SCORE: 6
2. NOT BEING ABLE TO STOP OR CONTROL WORRYING: NOT AT ALL
3. WORRYING TOO MUCH ABOUT DIFFERENT THINGS: SEVERAL DAYS
IF YOU CHECKED OFF ANY PROBLEMS ON THIS QUESTIONNAIRE, HOW DIFFICULT HAVE THESE PROBLEMS MADE IT FOR YOU TO DO YOUR WORK, TAKE CARE OF THINGS AT HOME, OR GET ALONG WITH OTHER PEOPLE: SOMEWHAT DIFFICULT
1. FEELING NERVOUS, ANXIOUS, OR ON EDGE: NOT AT ALL
6. BECOMING EASILY ANNOYED OR IRRITABLE: SEVERAL DAYS
GAD7 TOTAL SCORE: 6
5. BEING SO RESTLESS THAT IT IS HARD TO SIT STILL: SEVERAL DAYS
7. FEELING AFRAID AS IF SOMETHING AWFUL MIGHT HAPPEN: NOT AT ALL

## 2025-06-26 ASSESSMENT — COLUMBIA-SUICIDE SEVERITY RATING SCALE - C-SSRS
6. HAVE YOU EVER DONE ANYTHING, STARTED TO DO ANYTHING, OR PREPARED TO DO ANYTHING TO END YOUR LIFE?: NO
1. SINCE LAST CONTACT, HAVE YOU WISHED YOU WERE DEAD OR WISHED YOU COULD GO TO SLEEP AND NOT WAKE UP?: NO
SUICIDE, SINCE LAST CONTACT: NO
TOTAL  NUMBER OF ABORTED OR SELF INTERRUPTED ATTEMPTS SINCE LAST CONTACT: NO
2. HAVE YOU ACTUALLY HAD ANY THOUGHTS OF KILLING YOURSELF?: NO
ATTEMPT SINCE LAST CONTACT: NO
TOTAL  NUMBER OF INTERRUPTED ATTEMPTS SINCE LAST CONTACT: NO

## 2025-06-26 ASSESSMENT — PATIENT HEALTH QUESTIONNAIRE - PHQ9: SUM OF ALL RESPONSES TO PHQ QUESTIONS 1-9: 10

## 2025-06-26 NOTE — PROGRESS NOTES
[facility]                                     Progress Note    Patient Name: Tressa Jeong  Date: 6/26/2025        Service Type: Individual      Session Start Time: 1:00 PM  Session End Time: 1:53 PM     Session Length: 53+ min    Session #: 45    Attendees: Client attended alone    Service Modality:  Video Visit:      Provider verified identity through the following two step process.  Patient provided:  Patient is known previously to provider    Telemedicine Visit: The patient's condition can be safely assessed and treated via synchronous audio and visual telemedicine encounter.      Reason for Telemedicine Visit: Patient has requested telehealth visit    Originating Site (Patient Location): Patient's home    Distant Site (Provider Location): Provider Remote Setting- Home Office    Consent:  The patient/guardian has verbally consented to: the potential risks and benefits of telemedicine (video visit) versus in person care; bill my insurance or make self-payment for services provided; and responsibility for payment of non-covered services.     Patient would like the video invitation sent by:  My Chart    Mode of Communication:  Video Conference via [employer]well    Distant Location (Provider):  Off-site    As the provider I attest to compliance with applicable laws and regulations related to telemedicine.    DATA  Extended Session (53+ minutes): PROLONGED SERVICE IN THE OUTPATIENT SETTING REQUIRING DIRECT (FACE-TO-FACE) PATIENT CONTACT BEYOND THE USUAL SERVICE:    - Patient's presenting concerns require more intensive intervention than could be completed within the usual service  Interactive Complexity: No  Crisis: No        Progress Since Last Session (Related to Symptoms / Goals / Homework):   Symptoms: Improving symptoms anxiety and worsening symptoms of depression based on PHQ-9 and LYUBOV-7 scores and patient self-report.     Homework: Partially completed      Episode of Care Goals: Minimal  progress - ACTION (Actively working towards change); Intervened by reinforcing change plan / affirming steps taken     Current / Ongoing Stressors and Concerns:   Patient reports she is concerned about ADHD symptoms. Patient reports she is concerned about motivation. Patient reports she is concerned about working. Patient reports she is concerned about prioritizing tasks.  Patient reports concerns about re-entering the workforce. Patient reports she is concerned about productivity. Patient reports concerns about organization. Patient reports she is job searching.  Patient's mom was hospitalized. Patient reports she is concerned about her sleep habits. Patient reports concerns about burn out. Patient reports concerns about the election results. Patient reports concerns about the political climate. Patient reports she is concerned about task completion.      Treatment Objective(s) Addressed in This Session:   Patient will learn about the diagnosis and symptoms of ADHD.   Patient will learn about how ADHD can impact social interactions and interpersonal relationships.   Patient will practice setting goals and completing them.           Patient will learn 3 skills to help increase motivation and organization.  Patient will increase interpersonal effectiveness skills with family and friends to help manage conflict.    Patient will build upon the authentic self.         Patient will bring to session interpersonal conflicts to process and explore.   Patient will create and adhere to a routine.     Intervention:   Motivational Interviewing: supported patient in processing and exploring motivation and productivity.  Validated patient's emotions. Supported patient in processing and exploring how she is able to get herself to engage in multi step processes. Supported patient in processing and exploring how she can apply those strategies to tasks that she struggles to engage with. Supported patient in processing and exploring  trying to body double. Encouraged patient to ask her mom to spend time with her to help body double. Reviewed planning to complete tasks in the morning when her motivation levels are higher. Discussed being mindful of how effective these skills are.    Reviewed treatment plan and goals.     Assessments completed prior to visit:  The following assessments were completed by patient for this visit:  PHQ9:       10/31/2024     2:00 PM 1/2/2025     2:00 PM 1/30/2025     2:00 PM 4/3/2025     2:00 PM 5/1/2025     1:00 PM 6/12/2025     1:55 PM 6/26/2025     1:00 PM   PHQ-9 SCORE   PHQ-9 Total Score MyChart      9 (Mild depression)    PHQ-9 Total Score 14 11 11 15 15 9  10       Patient-reported     GAD7:       1/30/2025     2:00 PM 2/20/2025     2:00 PM 4/3/2025     2:00 PM 4/12/2025    12:55 PM 5/15/2025     1:00 PM 6/12/2025     3:00 PM 6/26/2025     1:00 PM   LYUBOV-7 SCORE   Total Score    13 (moderate anxiety)      Total Score 9 13 15 13  9 7 6       Patient-reported     PROMIS 10-Global Health (only subscores and total score):       2/29/2024    12:43 PM 4/4/2024    12:44 PM 6/20/2024    12:33 PM 10/3/2024     1:38 PM 1/2/2025     1:54 PM 4/3/2025     2:12 PM 4/12/2025    12:55 PM   PROMIS-10 Scores Only   Global Mental Health Score 11 11 10 11 10  9 10    Global Physical Health Score 14 15 16 15 16  16 15    PROMIS TOTAL - SUBSCORES 25 26 26 26 26  25 25        Patient-reported         ASSESSMENT: Current Emotional / Mental Status (status of significant symptoms):   Risk status (Self / Other harm or suicidal ideation)   Patient denies current fears or concerns for personal safety.   Patient denies current or recent suicidal ideation or behaviors.   Patient denies current or recent homicidal ideation or behaviors.   Patient denies current or recent self injurious behavior or ideation.   Patient denies other safety concerns.   Patient reports there has been no change in risk factors since their last session.     Patient  reports there has been no change in protective factors since their last session.     Recommended that patient call 911 or go to the local ED should there be a change in any of these risk factors     Appearance:   Appropriate    Eye Contact:   Good    Psychomotor Behavior: Normal    Attitude:   Cooperative    Orientation:   All   Speech    Rate / Production: Hyperverbal     Volume:  Normal    Mood:    Anxious  Sad    Affect:    Worrisome    Thought Content:  Rumination    Thought Form:  Logical    Insight:    Good      Medication Review:   No changes to current psychiatric medication(s)     Medication Compliance:   Yes     Changes in Health Issues:   None reported     Chemical Use Review:   Substance Use: Chemical use reviewed, no active concerns identified      Tobacco Use: No current tobacco use.      Diagnosis:  1. ADHD (attention deficit hyperactivity disorder), inattentive type    2. LYUBOV (generalized anxiety disorder)        Collateral Reports Completed:   Not Applicable    PLAN: (Patient Tasks / Therapist Tasks / Other)  Patient will adhere to her morning schedule for completing tasks. Patient will practice body doubling to help encourage task completion. We will discuss next session.       Aline Jimenez, [facility] Center[facility] 6/26/2025    ___________________________________________________________                                              Individual Treatment Plan    Patient's Name: Tressa Jeong  YOB: 1975    Date of Creation: 7/28/2023  Date Treatment Plan Last Reviewed/Revised: 6/26/2025  DSM5 Diagnoses: Attention-Deficit/Hyperactivity Disorder  314.00 (F90.0) Predominantly inattentive presentation or 300.02 (F41.1) Generalized Anxiety Disorder  Psychosocial / Contextual Factors: Patient is currently unemployed. Patient is living with her dad part time. Patient is single.   PROMIS (reviewed every 90 days):The following assessments were completed by patient for this visit:  PROMIS 10-Global Health (only  "subscores and total score):       2/29/2024    12:43 PM 4/4/2024    12:44 PM 6/20/2024    12:33 PM 10/3/2024     1:38 PM 1/2/2025     1:54 PM 4/3/2025     2:12 PM 4/12/2025    12:55 PM   PROMIS-10 Scores Only   Global Mental Health Score 11 11 10 11 10  9 10    Global Physical Health Score 14 15 16 15 16  16 15    PROMIS TOTAL - SUBSCORES 25 26 26 26 26  25 25        Patient-reported        Referral / Collaboration:  Referral to another professional/service is not indicated at this time.    Anticipated number of session for this episode of care: 50  Anticipation frequency of session: Every other week  Anticipated Duration of each session: 53 or more minutes  Treatment plan will be reviewed in 90 days or when goals have been changed. There has been demonstrated improvement in functioning while patient has been engaged in psychotherapy/psychological service- if withdrawn the patient would deteriorate and/or relapse.     MeasurableTreatment Goal(s) related to diagnosis / functional impairment(s)  Goal 1: Patient will work on stabilizing symptoms of ADHD.   \"I will know I've met my goal when I can keep a schedule\".      Objective #A  Patient will learn about the diagnosis and symptoms of ADHD.   Status: Continued - Date(s): 6/26/2025    Intervention(s)  Therapist will teach about the diagnosis of ADHD.     Objective #B  Patient will practice setting goals and completing them.                         Status: Continued - Date(s): 6/26/2025     Intervention(s)  Therapist will teach SMART goals and how to break tasks down into smaller tasks.     Objective #C  Patient will learn 3 skills to help increase motivation and organization.   Status: Continued - Date(s): 6/26/2025    Intervention(s)  Therapist will assign homework of using coping skills to increase motivation and organizational skills.    Objective #D  Patient will learn about how ADHD can impact social interactions and interpersonal relationships.  Status:Continued " - Date(s): 6/26/2025    Intervention(s)  Therapist will teach about how ADHD can impact social interactions and interpersonal relationships.     Objective #D  Patient will create and adhere to a routine.  Status:Continued - Date(s): 6/26/2025    Intervention(s)  Therapist will assign homework to create a routine and teach about how to maintain routine.         Goal 2: Client will work on stabilizing anxiety symptoms as evidenced by LYUBOV-7 scores (current is 6) and Self report.  Goal is to reduce by two points.      I will know I've met my goal when I can better manage my anxiety .      Objective #A Client will identify triggers of anxiety and process them in session.    Status: Continued - Date(s): 6/26/2025    Intervention(s)  Therapist will teach emotional recognition/identification by assigning homework to journal with written exercises.    Objective #B  Client will identify initial signs or symptoms of anxiety.    Status: Completed - Date: 4/3/2025    Intervention(s)  Therapist will teach emotional regulation skills, such as deep breathing and mindfulness skills.    Objective #C  Client will learn about co-morbidities between ADHD and LYUBOV.   Status: Continued - Date(s): 6/26/2025    Intervention(s)  Therapist will provide educational materials on symptoms of ADHD and LYUBOV.        Goal 3: Patient will increase effectiveness of interpersonal communication skills.     I will know I've met my goal when I can hold my boundaries with dad.       Objective #A  Patient will increase interpersonal effectiveness skills with family and friends to help manage conflict.    Status: Continued - Date(s): 6/26/2025     Intervention(s)  Therapist will role-play conflict management.     Objective #B  Patient will build upon the authentic self.         Status:Continued - Date(s): 6/26/2025     Intervention(s)  Therapist will assign homework through written exercises.     Objective #C  Patient will bring to session interpersonal  conflicts to process and explore.   Status: Continued - Date(s): 6/26/2025     Intervention(s)  Therapist will teach about healthy boundaries related to interpersonal relationships.      Patient has reviewed and agreed to the above plan.      TYE Marie  July 28, 2023  Reviewed 10/27/2023  Re-reviewed 1/19/2024 Re-reviewed 4/4/2024  Re-reviewed 6/20/2024 Re-reviewed 10/3/2024 Re-reviewed 1/2/2025  Re-reviewed 4/3/2025  Re-reviewed 6/26/2025

## 2025-07-08 ENCOUNTER — TELEPHONE (OUTPATIENT)
Dept: PSYCHOLOGY | Facility: CLINIC | Age: 50
End: 2025-07-08
Payer: COMMERCIAL

## 2025-07-23 ENCOUNTER — OFFICE VISIT (OUTPATIENT)
Dept: UROLOGY | Facility: CLINIC | Age: 50
End: 2025-07-23
Attending: FAMILY MEDICINE
Payer: COMMERCIAL

## 2025-07-23 VITALS
SYSTOLIC BLOOD PRESSURE: 127 MMHG | WEIGHT: 201 LBS | DIASTOLIC BLOOD PRESSURE: 68 MMHG | BODY MASS INDEX: 33.45 KG/M2 | HEART RATE: 83 BPM | OXYGEN SATURATION: 99 %

## 2025-07-23 DIAGNOSIS — N39.46 MIXED STRESS AND URGE URINARY INCONTINENCE: ICD-10-CM

## 2025-07-23 LAB
ALBUMIN UR-MCNC: NEGATIVE MG/DL
APPEARANCE UR: CLEAR
BACTERIA #/AREA URNS HPF: ABNORMAL /HPF
BILIRUB UR QL STRIP: NEGATIVE
COLOR UR AUTO: YELLOW
GLUCOSE UR STRIP-MCNC: NEGATIVE MG/DL
HGB UR QL STRIP: ABNORMAL
KETONES UR STRIP-MCNC: NEGATIVE MG/DL
LEUKOCYTE ESTERASE UR QL STRIP: NEGATIVE
MUCOUS THREADS #/AREA URNS LPF: PRESENT /LPF
NITRATE UR QL: NEGATIVE
PH UR STRIP: 6.5 [PH] (ref 5–7)
RBC #/AREA URNS AUTO: ABNORMAL /HPF
SP GR UR STRIP: 1.01 (ref 1–1.03)
SQUAMOUS #/AREA URNS AUTO: ABNORMAL /LPF
UROBILINOGEN UR STRIP-ACNC: 0.2 E.U./DL
WBC #/AREA URNS AUTO: ABNORMAL /HPF

## 2025-07-23 PROCEDURE — 99204 OFFICE O/P NEW MOD 45 MIN: CPT | Mod: 25 | Performed by: UROLOGY

## 2025-07-23 PROCEDURE — 3074F SYST BP LT 130 MM HG: CPT | Performed by: UROLOGY

## 2025-07-23 PROCEDURE — 52000 CYSTOURETHROSCOPY: CPT | Performed by: UROLOGY

## 2025-07-23 PROCEDURE — 81001 URINALYSIS AUTO W/SCOPE: CPT | Performed by: UROLOGY

## 2025-07-23 PROCEDURE — 3078F DIAST BP <80 MM HG: CPT | Performed by: UROLOGY

## 2025-07-23 RX ORDER — SERTRALINE HYDROCHLORIDE 100 MG/1
TABLET, FILM COATED ORAL
COMMUNITY
Start: 2024-06-01

## 2025-07-23 RX ORDER — LISDEXAMFETAMINE DIMESYLATE 50 MG/1
50 CAPSULE ORAL EVERY MORNING
COMMUNITY

## 2025-07-23 NOTE — PROGRESS NOTES
"Tressa Jeong is a 49 year old female seen in consultation for incontinence. Consult from [facility] [place]      Pr reports 4 yr hx progressive urge and urge incontinence \"if I wait too long.\" Pt states that she often waits too long \"because I have ADHD.\"    Pt also drinks large volume of fluids due to dry mouth from meds; hx tobacco use also noted.    Denies significant stress and insensate components.    Does not wear a pad during the day due to \"my skin is too sensitive\" but changes underwear mid day due to urinary soilage. No pad or leak at nite.    Voids about q 90 minutes during the day and 2x at nite.    Denies significant UTI's, prior  eval, hx bladder surgery, use of bladder meds. Would like to start pelvic floor exercises to improve her bladder control.    Never pregnant.     Denies constipation, fecal incontinence.    Drinks 2 caf coffee, 2 herbal tea, 4 cans of bubbly water per day.     Former smoker.       Reviewed PMH in detail including major depression, ADHD, PEGGY      Past Medical History:   Diagnosis Date    Depressive disorder        Past Surgical History:   Procedure Laterality Date    COLONOSCOPY N/A 9/8/2023    Procedure: COLONOSCOPY, WITH POLYPECTOMY;  Surgeon: Mk Garay MD;  Location: [facility] OR    ORTHOPEDIC SURGERY         Social History     Socioeconomic History    Marital status: Single     Spouse name: Not on file    Number of children: Not on file    Years of education: Not on file    Highest education level: Not on file   Occupational History    Not on file   Tobacco Use    Smoking status: Former     Current packs/day: 0.50     Average packs/day: 0.5 packs/day for 10.0 years (5.0 ttl pk-yrs)     Types: Cigarettes     Passive exposure: Never    Smokeless tobacco: Never   Vaping Use    Vaping status: Never Used   Substance and Sexual Activity    Alcohol use: No    Drug use: Not Currently     Types: Marijuana    Sexual activity: Not Currently     Partners: Male   Other " Topics Concern    Parent/sibling w/ CABG, MI or angioplasty before 65F 55M? Not Asked   Social History Narrative    Not on file     Social Drivers of Health     Financial Resource Strain: Unknown (4/28/2025)    Financial Resource Strain     Within the past 12 months, have you or your family members you live with been unable to get utilities (heat, electricity) when it was really needed?: Patient declined   Food Insecurity: Unknown (4/28/2025)    Food Insecurity     Within the past 12 months, did you worry that your food would run out before you got money to buy more?: Patient declined     Within the past 12 months, did the food you bought just not last and you didn t have money to get more?: Patient declined   Transportation Needs: Unknown (4/28/2025)    Transportation Needs     Within the past 12 months, has lack of transportation kept you from medical appointments, getting your medicines, non-medical meetings or appointments, work, or from getting things that you need?: Patient declined   Physical Activity: Sufficiently Active (4/28/2025)    Exercise Vital Sign     Days of Exercise per Week: 5 days     Minutes of Exercise per Session: 30 min   Stress: Stress Concern Present (4/28/2025)    [employer] [place] [employer] - Occupational Stress Questionnaire     Feeling of Stress : Rather much   Social Connections: Unknown (4/28/2025)    Social Connection and Isolation Panel [NHANES]     Frequency of Communication with Friends and Family: Not on file     Frequency of Social Gatherings with Friends and Family: Once a week     Attends Worship Services: Not on file     Active Member of Clubs or Organizations: Not on file     Attends Club or Organization Meetings: Not on file     Marital Status: Not on file   Interpersonal Safety: Low Risk  (4/28/2025)    Interpersonal Safety     Do you feel physically and emotionally safe where you currently live?: Yes     Within the past 12 months, have you been hit,  "slapped, kicked or otherwise physically hurt by someone?: No     Within the past 12 months, have you been humiliated or emotionally abused in other ways by your partner or ex-partner?: No   Housing Stability: Unknown (4/28/2025)    Housing Stability     Do you have housing? : Patient declined     Are you worried about losing your housing?: Patient declined       Current Outpatient Medications   Medication Sig Dispense Refill    betamethasone valerate (VALISONE) 0.1 % external ointment Apply topically 2 times daily Apply twice daily as needed for bug bites. 45 g 2    buPROPion (WELLBUTRIN XL) 150 MG 24 hr tablet Take 1 tablet every day by oral route.*      clonazePAM (KLONOPIN) 0.5 MG tablet Take 0.5 mg by mouth daily as needed      fluocinonide (LIDEX) 0.05 % external ointment Apply twice daily as needed for bug bites on trunk or extremities 60 g 2    sertraline (ZOLOFT) 25 MG tablet Take 125 mg by mouth daily.      SF 5000 PLUS 1.1 % CREA BRUSH WITH A SMALL AMOUNT 2 TIMES PER DAY. DO NOT SWALLOW. DO NOT EAT, DRINK OR RINSE FOR 30 MINUTES AFTERWARD.*         Physical Exam:    GENL: NAD.  5'5\"   201#   ABD: Soft, non-tender, no masses.    EG: Well-estrogenized, no masses.    VAGINA: Well-estrogenized, no masses.    BN HYPERMOBILITY: None.    CYSTOCELE: None.    APICAL PROLAPSE: None.    RECTOCELE: None.    BIMANUAL: No mass or tenderness.    Cysto:    (Informed consent obtained. Pause for cause performed)   Sterile prep.    17 Fr scope inserted through urethra. Systematic examination w 70 degree lens.   PVR: 3 cc   MUCOSA: Normal without lesion   ORIFICES: Normal location and morphology   CAPACITY: 475 cc; no pain with filling   Scope withdrawn without untoward effect.    (Pt tolerated procedure without difficulty).        Results for orders placed or performed in visit on 07/23/25   Urine Macroscopic with reflex to Microscopic     Status: Abnormal   Result Value Ref Range    Color Urine Yellow Colorless, Straw, " Light Yellow, Yellow    Appearance Urine Clear Clear    Glucose Urine Negative Negative mg/dL    Bilirubin Urine Negative Negative    Ketones Urine Negative Negative mg/dL    Specific Gravity Urine 1.015 1.003 - 1.035    Blood Urine Trace (A) Negative    pH Urine 6.5 5.0 - 7.0    Protein Albumin Urine Negative Negative mg/dL    Urobilinogen Urine 0.2 0.2, 1.0 E.U./dL    Nitrite Urine Negative Negative    Leukocyte Esterase Urine Negative Negative         IMP:  1. OAB wet, large fluids  2. Other medical issues      PLAN:  1. Discussed situation with patient in detail.  2. Consider some moderation of fluids; could use sugarless gum/candy or Biotene  3. Agree with pelvic floor exercises  4. RTC 6 mos to review progress; might consider meds at that point  5. Total time spent in reviewing patient records, discussing history, performing exam, discussing diagnosis, outlining treatment plan and documentation: 60 minutes

## 2025-07-24 ENCOUNTER — VIRTUAL VISIT (OUTPATIENT)
Dept: PSYCHOLOGY | Facility: CLINIC | Age: 50
End: 2025-07-24
Payer: COMMERCIAL

## 2025-07-24 DIAGNOSIS — F90.0 ADHD (ATTENTION DEFICIT HYPERACTIVITY DISORDER), INATTENTIVE TYPE: Primary | ICD-10-CM

## 2025-07-24 DIAGNOSIS — F41.1 GAD (GENERALIZED ANXIETY DISORDER): ICD-10-CM

## 2025-07-24 ASSESSMENT — ANXIETY QUESTIONNAIRES
6. BECOMING EASILY ANNOYED OR IRRITABLE: NEARLY EVERY DAY
7. FEELING AFRAID AS IF SOMETHING AWFUL MIGHT HAPPEN: NOT AT ALL
1. FEELING NERVOUS, ANXIOUS, OR ON EDGE: MORE THAN HALF THE DAYS
3. WORRYING TOO MUCH ABOUT DIFFERENT THINGS: SEVERAL DAYS
GAD7 TOTAL SCORE: 10
GAD7 TOTAL SCORE: 10
IF YOU CHECKED OFF ANY PROBLEMS ON THIS QUESTIONNAIRE, HOW DIFFICULT HAVE THESE PROBLEMS MADE IT FOR YOU TO DO YOUR WORK, TAKE CARE OF THINGS AT HOME, OR GET ALONG WITH OTHER PEOPLE: SOMEWHAT DIFFICULT
4. TROUBLE RELAXING: MORE THAN HALF THE DAYS
5. BEING SO RESTLESS THAT IT IS HARD TO SIT STILL: SEVERAL DAYS
2. NOT BEING ABLE TO STOP OR CONTROL WORRYING: SEVERAL DAYS

## 2025-07-24 NOTE — PROGRESS NOTES
[facility]                                     Progress Note    Patient Name: Tressa Jeong  Date: 7/24/2025        Service Type: Individual      Session Start Time: 2:01 PM  Session End Time: 2:56 PM     Session Length: 53+ min    Session #: 46    Attendees: Client attended alone    Service Modality:  Video Visit:      Provider verified identity through the following two step process.  Patient provided:  Patient is known previously to provider    Telemedicine Visit: The patient's condition can be safely assessed and treated via synchronous audio and visual telemedicine encounter.      Reason for Telemedicine Visit: Patient has requested telehealth visit    Originating Site (Patient Location): Patient's home    Distant Site (Provider Location): Provider Remote Setting- Home Office    Consent:  The patient/guardian has verbally consented to: the potential risks and benefits of telemedicine (video visit) versus in person care; bill my insurance or make self-payment for services provided; and responsibility for payment of non-covered services.     Patient would like the video invitation sent by:  My Chart    Mode of Communication:  Video Conference via [employer]    Distant Location (Provider):  Off-site    As the provider I attest to compliance with applicable laws and regulations related to telemedicine.    DATA  Extended Session (53+ minutes): PROLONGED SERVICE IN THE OUTPATIENT SETTING REQUIRING DIRECT (FACE-TO-FACE) PATIENT CONTACT BEYOND THE USUAL SERVICE:    - Patient's presenting concerns require more intensive intervention than could be completed within the usual service  Interactive Complexity: No  Crisis: No        Progress Since Last Session (Related to Symptoms / Goals / Homework):   Symptoms: Worsening symptoms of anxiety based on LYUBOV-7 scores and patient self-report.    Homework: Partially completed      Episode of Care Goals: Minimal progress - ACTION (Actively working towards  change); Intervened by reinforcing change plan / affirming steps taken     Current / Ongoing Stressors and Concerns:   Patient reports she is concerned about ADHD symptoms. Patient reports she is concerned about motivation. Patient reports she is concerned about working. Patient reports she is concerned about prioritizing tasks.  Patient reports concerns about re-entering the workforce. Patient reports she is concerned about productivity. Patient reports concerns about organization. Patient reports she is job searching.  Patient's mom was hospitalized. Patient reports she is concerned about her sleep habits. Patient reports concerns about burn out. Patient reports concerns about the election results. Patient reports concerns about the political climate. Patient reports she is concerned about task completion. Patient reports concerns about an internal conflict.      Treatment Objective(s) Addressed in This Session:   Patient will learn about the diagnosis and symptoms of ADHD.   Patient will learn about how ADHD can impact social interactions and interpersonal relationships.   Patient will practice setting goals and completing them.           Patient will learn 3 skills to help increase motivation and organization.  Patient will increase interpersonal effectiveness skills with family and friends to help manage conflict.    Patient will build upon the authentic self.         Patient will bring to session interpersonal conflicts to process and explore.   Patient will create and adhere to a routine.     Intervention:   Motivational Interviewing: supported patient in processing and exploring an internal conflict.  Validated patient's emotions. Supported patient in processing and exploring what she would like to change in her relationship with her dad. Supported patient in processing and exploring the option of family therapy and how she would like to approach this conversation with her dad. Reviewed effective  communication skills and role played a conversation where patient uses her skills to effectively communicate with her dad. Discussed being mindful of how effective these skills are.      Assessments completed prior to visit:  The following assessments were completed by patient for this visit:  PHQ9:       10/31/2024     2:00 PM 1/2/2025     2:00 PM 1/30/2025     2:00 PM 4/3/2025     2:00 PM 5/1/2025     1:00 PM 6/12/2025     1:55 PM 6/26/2025     1:00 PM   PHQ-9 SCORE   PHQ-9 Total Score MyChart      9 (Mild depression)    PHQ-9 Total Score 14 11 11 15 15 9  10       Patient-reported     GAD7:       2/20/2025     2:00 PM 4/3/2025     2:00 PM 4/12/2025    12:55 PM 5/15/2025     1:00 PM 6/12/2025     3:00 PM 6/26/2025     1:00 PM 7/24/2025     2:00 PM   LYUBOV-7 SCORE   Total Score   13 (moderate anxiety)       Total Score 13 15 13  9 7 6 10       Patient-reported     PROMIS 10-Global Health (only subscores and total score):       2/29/2024    12:43 PM 4/4/2024    12:44 PM 6/20/2024    12:33 PM 10/3/2024     1:38 PM 1/2/2025     1:54 PM 4/3/2025     2:12 PM 4/12/2025    12:55 PM   PROMIS-10 Scores Only   Global Mental Health Score 11 11 10 11 10  9 10    Global Physical Health Score 14 15 16 15 16  16 15    PROMIS TOTAL - SUBSCORES 25 26 26 26 26  25 25        Patient-reported         ASSESSMENT: Current Emotional / Mental Status (status of significant symptoms):   Risk status (Self / Other harm or suicidal ideation)   Patient denies current fears or concerns for personal safety.   Patient denies current or recent suicidal ideation or behaviors.   Patient denies current or recent homicidal ideation or behaviors.   Patient denies current or recent self injurious behavior or ideation.   Patient denies other safety concerns.   Patient reports there has been no change in risk factors since their last session.     Patient reports there has been no change in protective factors since their last session.     Recommended that  patient call 911 or go to the local ED should there be a change in any of these risk factors     Appearance:   Appropriate    Eye Contact:   Good    Psychomotor Behavior: Normal    Attitude:   Cooperative    Orientation:   All   Speech    Rate / Production: Hyperverbal     Volume:  Normal    Mood:    Anxious  Sad    Affect:    Worrisome    Thought Content:  Rumination    Thought Form:  Logical    Insight:    Good      Medication Review:   No changes to current psychiatric medication(s)     Medication Compliance:   Yes     Changes in Health Issues:   None reported     Chemical Use Review:   Substance Use: Chemical use reviewed, no active concerns identified      Tobacco Use: No current tobacco use.      Diagnosis:  1. ADHD (attention deficit hyperactivity disorder), inattentive type    2. LYUBOV (generalized anxiety disorder)        Collateral Reports Completed:   Not Applicable    PLAN: (Patient Tasks / Therapist Tasks / Other)  Patient will practice using effective communication to talk to her dad. Patient will be mindful of how effective this is.  We will discuss next session.       Aline Jimenez, [facility] 7/24/2025    ___________________________________________________________                                              Individual Treatment Plan    Patient's Name: Tressa Jeong  YOB: 1975    Date of Creation: 7/28/2023  Date Treatment Plan Last Reviewed/Revised: 6/26/2025  DSM5 Diagnoses: Attention-Deficit/Hyperactivity Disorder  314.00 (F90.0) Predominantly inattentive presentation or 300.02 (F41.1) Generalized Anxiety Disorder  Psychosocial / Contextual Factors: Patient is currently unemployed. Patient is living with her dad part time. Patient is single.   PROMIS (reviewed every 90 days):The following assessments were completed by patient for this visit:  PROMIS 10-Global Health (only subscores and total score):       2/29/2024    12:43 PM 4/4/2024    12:44 PM 6/20/2024    12:33 PM 10/3/2024     1:38 PM  "1/2/2025     1:54 PM 4/3/2025     2:12 PM 4/12/2025    12:55 PM   PROMIS-10 Scores Only   Global Mental Health Score 11 11 10 11 10  9 10    Global Physical Health Score 14 15 16 15 16  16 15    PROMIS TOTAL - SUBSCORES 25 26 26 26 26  25 25        Patient-reported        Referral / Collaboration:  Referral to another professional/service is not indicated at this time.    Anticipated number of session for this episode of care: 50  Anticipation frequency of session: Every other week  Anticipated Duration of each session: 53 or more minutes  Treatment plan will be reviewed in 90 days or when goals have been changed. There has been demonstrated improvement in functioning while patient has been engaged in psychotherapy/psychological service- if withdrawn the patient would deteriorate and/or relapse.     MeasurableTreatment Goal(s) related to diagnosis / functional impairment(s)  Goal 1: Patient will work on stabilizing symptoms of ADHD.   \"I will know I've met my goal when I can keep a schedule\".      Objective #A  Patient will learn about the diagnosis and symptoms of ADHD.   Status: Continued - Date(s): 6/26/2025    Intervention(s)  Therapist will teach about the diagnosis of ADHD.     Objective #B  Patient will practice setting goals and completing them.                         Status: Continued - Date(s): 6/26/2025     Intervention(s)  Therapist will teach SMART goals and how to break tasks down into smaller tasks.     Objective #C  Patient will learn 3 skills to help increase motivation and organization.   Status: Continued - Date(s): 6/26/2025    Intervention(s)  Therapist will assign homework of using coping skills to increase motivation and organizational skills.    Objective #D  Patient will learn about how ADHD can impact social interactions and interpersonal relationships.  Status:Continued - Date(s): 6/26/2025    Intervention(s)  Therapist will teach about how ADHD can impact social interactions and " interpersonal relationships.     Objective #D  Patient will create and adhere to a routine.  Status:Continued - Date(s): 6/26/2025    Intervention(s)  Therapist will assign homework to create a routine and teach about how to maintain routine.         Goal 2: Client will work on stabilizing anxiety symptoms as evidenced by LYUBOV-7 scores (current is 6) and Self report.  Goal is to reduce by two points.      I will know I've met my goal when I can better manage my anxiety .      Objective #A Client will identify triggers of anxiety and process them in session.    Status: Continued - Date(s): 6/26/2025    Intervention(s)  Therapist will teach emotional recognition/identification by assigning homework to journal with written exercises.    Objective #B  Client will identify initial signs or symptoms of anxiety.    Status: Completed - Date: 4/3/2025    Intervention(s)  Therapist will teach emotional regulation skills, such as deep breathing and mindfulness skills.    Objective #C  Client will learn about co-morbidities between ADHD and LYUBOV.   Status: Continued - Date(s): 6/26/2025    Intervention(s)  Therapist will provide educational materials on symptoms of ADHD and LYUBOV.        Goal 3: Patient will increase effectiveness of interpersonal communication skills.     I will know I've met my goal when I can hold my boundaries with dad.       Objective #A  Patient will increase interpersonal effectiveness skills with family and friends to help manage conflict.    Status: Continued - Date(s): 6/26/2025     Intervention(s)  Therapist will role-play conflict management.     Objective #B  Patient will build upon the authentic self.         Status:Continued - Date(s): 6/26/2025     Intervention(s)  Therapist will assign homework through written exercises.     Objective #C  Patient will bring to session interpersonal conflicts to process and explore.   Status: Continued - Date(s): 6/26/2025     Intervention(s)  Therapist will teach  about healthy boundaries related to interpersonal relationships.      Patient has reviewed and agreed to the above plan.      TYE Marie  July 28, 2023  Reviewed 10/27/2023  Re-reviewed 1/19/2024 Re-reviewed 4/4/2024  Re-reviewed 6/20/2024 Re-reviewed 10/3/2024 Re-reviewed 1/2/2025  Re-reviewed 4/3/2025  Re-reviewed 6/26/2025

## 2025-08-05 ENCOUNTER — OFFICE VISIT (OUTPATIENT)
Dept: SLEEP MEDICINE | Facility: CLINIC | Age: 50
End: 2025-08-05
Payer: COMMERCIAL

## 2025-08-05 VITALS
HEART RATE: 86 BPM | HEIGHT: 65 IN | SYSTOLIC BLOOD PRESSURE: 120 MMHG | BODY MASS INDEX: 34.47 KG/M2 | WEIGHT: 206.9 LBS | OXYGEN SATURATION: 98 % | DIASTOLIC BLOOD PRESSURE: 73 MMHG

## 2025-08-05 DIAGNOSIS — G47.33 OSA ON CPAP: Primary | ICD-10-CM

## 2025-08-05 DIAGNOSIS — Z72.821 INADEQUATE SLEEP HYGIENE: ICD-10-CM

## 2025-08-05 DIAGNOSIS — G47.21 CIRCADIAN RHYTHM SLEEP DISORDER, DELAYED SLEEP PHASE TYPE: ICD-10-CM

## 2025-08-05 PROCEDURE — 99215 OFFICE O/P EST HI 40 MIN: CPT | Performed by: INTERNAL MEDICINE

## 2025-08-05 PROCEDURE — 3078F DIAST BP <80 MM HG: CPT | Performed by: INTERNAL MEDICINE

## 2025-08-05 PROCEDURE — 1126F AMNT PAIN NOTED NONE PRSNT: CPT | Performed by: INTERNAL MEDICINE

## 2025-08-05 PROCEDURE — 3074F SYST BP LT 130 MM HG: CPT | Performed by: INTERNAL MEDICINE

## 2025-08-05 ASSESSMENT — SLEEP AND FATIGUE QUESTIONNAIRES
HOW LIKELY ARE YOU TO NOD OFF OR FALL ASLEEP WHILE SITTING INACTIVE IN A PUBLIC PLACE: WOULD NEVER DOZE
HOW LIKELY ARE YOU TO NOD OFF OR FALL ASLEEP WHILE LYING DOWN TO REST IN THE AFTERNOON WHEN CIRCUMSTANCES PERMIT: SLIGHT CHANCE OF DOZING
HOW LIKELY ARE YOU TO NOD OFF OR FALL ASLEEP WHILE SITTING AND TALKING TO SOMEONE: WOULD NEVER DOZE
HOW LIKELY ARE YOU TO NOD OFF OR FALL ASLEEP WHILE SITTING QUIETLY AFTER LUNCH WITHOUT ALCOHOL: WOULD NEVER DOZE
HOW LIKELY ARE YOU TO NOD OFF OR FALL ASLEEP IN A CAR, WHILE STOPPED FOR A FEW MINUTES IN TRAFFIC: WOULD NEVER DOZE
HOW LIKELY ARE YOU TO NOD OFF OR FALL ASLEEP WHILE WATCHING TV: MODERATE CHANCE OF DOZING
HOW LIKELY ARE YOU TO NOD OFF OR FALL ASLEEP WHEN YOU ARE A PASSENGER IN A CAR FOR AN HOUR WITHOUT A BREAK: SLIGHT CHANCE OF DOZING
HOW LIKELY ARE YOU TO NOD OFF OR FALL ASLEEP WHILE SITTING AND READING: MODERATE CHANCE OF DOZING

## 2025-08-07 ENCOUNTER — VIRTUAL VISIT (OUTPATIENT)
Dept: PSYCHOLOGY | Facility: CLINIC | Age: 50
End: 2025-08-07
Payer: COMMERCIAL

## 2025-08-07 DIAGNOSIS — F41.1 GAD (GENERALIZED ANXIETY DISORDER): ICD-10-CM

## 2025-08-07 DIAGNOSIS — F90.0 ADHD (ATTENTION DEFICIT HYPERACTIVITY DISORDER), INATTENTIVE TYPE: Primary | ICD-10-CM

## 2025-08-13 ENCOUNTER — DOCUMENTATION ONLY (OUTPATIENT)
Dept: SLEEP MEDICINE | Facility: CLINIC | Age: 50
End: 2025-08-13
Payer: COMMERCIAL

## 2025-08-21 ENCOUNTER — VIRTUAL VISIT (OUTPATIENT)
Dept: PSYCHOLOGY | Facility: CLINIC | Age: 50
End: 2025-08-21
Payer: COMMERCIAL

## 2025-08-21 DIAGNOSIS — F90.0 ADHD (ATTENTION DEFICIT HYPERACTIVITY DISORDER), INATTENTIVE TYPE: Primary | ICD-10-CM

## 2025-08-21 DIAGNOSIS — F41.1 GAD (GENERALIZED ANXIETY DISORDER): ICD-10-CM

## 2025-08-21 ASSESSMENT — ANXIETY QUESTIONNAIRES
IF YOU CHECKED OFF ANY PROBLEMS ON THIS QUESTIONNAIRE, HOW DIFFICULT HAVE THESE PROBLEMS MADE IT FOR YOU TO DO YOUR WORK, TAKE CARE OF THINGS AT HOME, OR GET ALONG WITH OTHER PEOPLE: SOMEWHAT DIFFICULT
4. TROUBLE RELAXING: NEARLY EVERY DAY
3. WORRYING TOO MUCH ABOUT DIFFERENT THINGS: SEVERAL DAYS
GAD7 TOTAL SCORE: 7
7. FEELING AFRAID AS IF SOMETHING AWFUL MIGHT HAPPEN: NOT AT ALL
2. NOT BEING ABLE TO STOP OR CONTROL WORRYING: NOT AT ALL
GAD7 TOTAL SCORE: 7
1. FEELING NERVOUS, ANXIOUS, OR ON EDGE: SEVERAL DAYS
5. BEING SO RESTLESS THAT IT IS HARD TO SIT STILL: SEVERAL DAYS
6. BECOMING EASILY ANNOYED OR IRRITABLE: SEVERAL DAYS

## 2025-09-02 ENCOUNTER — VIRTUAL VISIT (OUTPATIENT)
Dept: PSYCHOLOGY | Facility: CLINIC | Age: 50
End: 2025-09-02
Payer: COMMERCIAL

## 2025-09-02 DIAGNOSIS — F90.0 ADHD (ATTENTION DEFICIT HYPERACTIVITY DISORDER), INATTENTIVE TYPE: Primary | ICD-10-CM

## 2025-09-02 DIAGNOSIS — F41.1 GAD (GENERALIZED ANXIETY DISORDER): ICD-10-CM

## 2025-09-02 PROCEDURE — 90837 PSYTX W PT 60 MINUTES: CPT | Mod: 95

## 2025-09-02 ASSESSMENT — PATIENT HEALTH QUESTIONNAIRE - PHQ9
10. IF YOU CHECKED OFF ANY PROBLEMS, HOW DIFFICULT HAVE THESE PROBLEMS MADE IT FOR YOU TO DO YOUR WORK, TAKE CARE OF THINGS AT HOME, OR GET ALONG WITH OTHER PEOPLE: VERY DIFFICULT
SUM OF ALL RESPONSES TO PHQ QUESTIONS 1-9: 9
SUM OF ALL RESPONSES TO PHQ QUESTIONS 1-9: 9

## (undated) DEVICE — SPECIMEN CONTAINER 3OZ W/FORMALIN 59901

## (undated) DEVICE — SUCTION MANIFOLD NEPTUNE 2 SYS 1 PORT 702-025-000

## (undated) DEVICE — TUBING SUCTION MEDI-VAC 1/4"X20' N620A

## (undated) DEVICE — SOL WATER IRRIG 500ML BOTTLE 2F7113

## (undated) DEVICE — GOWN IMPERVIOUS 2XL BLUE

## (undated) DEVICE — SNARE CAPIVATOR ROUND COLD SNR BX10 M00561101

## (undated) DEVICE — KIT ENDO TURNOVER/PROCEDURE CARRY-ON 101822